# Patient Record
Sex: MALE | Race: WHITE | NOT HISPANIC OR LATINO | Employment: OTHER | URBAN - METROPOLITAN AREA
[De-identification: names, ages, dates, MRNs, and addresses within clinical notes are randomized per-mention and may not be internally consistent; named-entity substitution may affect disease eponyms.]

---

## 2020-10-09 ENCOUNTER — APPOINTMENT (EMERGENCY)
Dept: CT IMAGING | Facility: HOSPITAL | Age: 50
DRG: 853 | End: 2020-10-09
Payer: COMMERCIAL

## 2020-10-09 ENCOUNTER — HOSPITAL ENCOUNTER (INPATIENT)
Facility: HOSPITAL | Age: 50
LOS: 29 days | Discharge: HOME HEALTH | DRG: 853 | End: 2020-11-07
Attending: EMERGENCY MEDICINE | Admitting: SURGERY
Payer: COMMERCIAL

## 2020-10-09 DIAGNOSIS — K57.20 DIVERTICULITIS OF LARGE INTESTINE WITH PERFORATION WITHOUT BLEEDING: ICD-10-CM

## 2020-10-09 DIAGNOSIS — K57.92 DIVERTICULITIS: Primary | ICD-10-CM

## 2020-10-09 DIAGNOSIS — K56.7 ILEUS (HCC): ICD-10-CM

## 2020-10-09 DIAGNOSIS — E03.9 HYPOTHYROIDISM, UNSPECIFIED TYPE: ICD-10-CM

## 2020-10-09 DIAGNOSIS — I10 ESSENTIAL HYPERTENSION: ICD-10-CM

## 2020-10-09 DIAGNOSIS — Z93.3 COLOSTOMY STATUS (HCC): ICD-10-CM

## 2020-10-09 LAB
ALBUMIN SERPL BCP-MCNC: 3.3 G/DL (ref 3.5–5)
ALP SERPL-CCNC: 51 U/L (ref 46–116)
ALT SERPL W P-5'-P-CCNC: 19 U/L (ref 12–78)
ANION GAP SERPL CALCULATED.3IONS-SCNC: 9 MMOL/L (ref 4–13)
APTT PPP: 25 SECONDS (ref 23–37)
AST SERPL W P-5'-P-CCNC: 13 U/L (ref 5–45)
BASOPHILS # BLD MANUAL: 0 THOUSAND/UL (ref 0–0.1)
BASOPHILS NFR MAR MANUAL: 0 % (ref 0–1)
BILIRUB SERPL-MCNC: 0.6 MG/DL (ref 0.2–1)
BILIRUB UR QL STRIP: NEGATIVE
BUN SERPL-MCNC: 14 MG/DL (ref 5–25)
CALCIUM ALBUM COR SERPL-MCNC: 9.6 MG/DL (ref 8.3–10.1)
CALCIUM SERPL-MCNC: 9 MG/DL (ref 8.3–10.1)
CHLORIDE SERPL-SCNC: 97 MMOL/L (ref 100–108)
CLARITY UR: ABNORMAL
CO2 SERPL-SCNC: 28 MMOL/L (ref 21–32)
COLOR UR: YELLOW
CREAT SERPL-MCNC: 0.94 MG/DL (ref 0.6–1.3)
EOSINOPHIL # BLD MANUAL: 0 THOUSAND/UL (ref 0–0.4)
EOSINOPHIL NFR BLD MANUAL: 0 % (ref 0–6)
ERYTHROCYTE [DISTWIDTH] IN BLOOD BY AUTOMATED COUNT: 12 % (ref 11.6–15.1)
GFR SERPL CREATININE-BSD FRML MDRD: 93 ML/MIN/1.73SQ M
GLUCOSE SERPL-MCNC: 149 MG/DL (ref 65–140)
GLUCOSE UR STRIP-MCNC: NEGATIVE MG/DL
HCT VFR BLD AUTO: 41.6 % (ref 36.5–49.3)
HGB BLD-MCNC: 14.3 G/DL (ref 12–17)
HGB UR QL STRIP.AUTO: NEGATIVE
INR PPP: 1.09 (ref 0.84–1.19)
KETONES UR STRIP-MCNC: ABNORMAL MG/DL
LACTATE SERPL-SCNC: 1.9 MMOL/L (ref 0.5–2)
LACTATE SERPL-SCNC: 2.3 MMOL/L (ref 0.5–2)
LEUKOCYTE ESTERASE UR QL STRIP: NEGATIVE
LG PLATELETS BLD QL SMEAR: PRESENT
LIPASE SERPL-CCNC: 73 U/L (ref 73–393)
LYMPHOCYTES # BLD AUTO: 0.15 THOUSAND/UL (ref 0.6–4.47)
LYMPHOCYTES # BLD AUTO: 6 % (ref 14–44)
MCH RBC QN AUTO: 32.4 PG (ref 26.8–34.3)
MCHC RBC AUTO-ENTMCNC: 34.4 G/DL (ref 31.4–37.4)
MCV RBC AUTO: 94 FL (ref 82–98)
MONOCYTES # BLD AUTO: 0.28 THOUSAND/UL (ref 0–1.22)
MONOCYTES NFR BLD: 11 % (ref 4–12)
NEUTROPHILS # BLD MANUAL: 2.06 THOUSAND/UL (ref 1.85–7.62)
NEUTS BAND NFR BLD MANUAL: 15 % (ref 0–8)
NEUTS SEG NFR BLD AUTO: 67 % (ref 43–75)
NITRITE UR QL STRIP: NEGATIVE
NRBC BLD AUTO-RTO: 0 /100 WBCS
PH UR STRIP.AUTO: 7.5 [PH]
PLATELET # BLD AUTO: 375 THOUSANDS/UL (ref 149–390)
PLATELET BLD QL SMEAR: ADEQUATE
PMV BLD AUTO: 8.6 FL (ref 8.9–12.7)
POIKILOCYTOSIS BLD QL SMEAR: PRESENT
POTASSIUM SERPL-SCNC: 4 MMOL/L (ref 3.5–5.3)
PROT SERPL-MCNC: 6.6 G/DL (ref 6.4–8.2)
PROT UR STRIP-MCNC: NEGATIVE MG/DL
PROTHROMBIN TIME: 14.1 SECONDS (ref 11.6–14.5)
RBC # BLD AUTO: 4.42 MILLION/UL (ref 3.88–5.62)
RBC MORPH BLD: PRESENT
SODIUM SERPL-SCNC: 134 MMOL/L (ref 136–145)
SP GR UR STRIP.AUTO: 1.01 (ref 1–1.03)
TOTAL CELLS COUNTED SPEC: 100
TOXIC GRANULES BLD QL SMEAR: PRESENT
UROBILINOGEN UR QL STRIP.AUTO: 1 E.U./DL
VARIANT LYMPHS # BLD AUTO: 1 %
WBC # BLD AUTO: 2.51 THOUSAND/UL (ref 4.31–10.16)

## 2020-10-09 PROCEDURE — 99285 EMERGENCY DEPT VISIT HI MDM: CPT

## 2020-10-09 PROCEDURE — 87040 BLOOD CULTURE FOR BACTERIA: CPT | Performed by: EMERGENCY MEDICINE

## 2020-10-09 PROCEDURE — 80053 COMPREHEN METABOLIC PANEL: CPT | Performed by: EMERGENCY MEDICINE

## 2020-10-09 PROCEDURE — 85730 THROMBOPLASTIN TIME PARTIAL: CPT | Performed by: EMERGENCY MEDICINE

## 2020-10-09 PROCEDURE — G1004 CDSM NDSC: HCPCS

## 2020-10-09 PROCEDURE — 85007 BL SMEAR W/DIFF WBC COUNT: CPT | Performed by: EMERGENCY MEDICINE

## 2020-10-09 PROCEDURE — 83605 ASSAY OF LACTIC ACID: CPT | Performed by: EMERGENCY MEDICINE

## 2020-10-09 PROCEDURE — 81003 URINALYSIS AUTO W/O SCOPE: CPT | Performed by: EMERGENCY MEDICINE

## 2020-10-09 PROCEDURE — 96361 HYDRATE IV INFUSION ADD-ON: CPT

## 2020-10-09 PROCEDURE — 96375 TX/PRO/DX INJ NEW DRUG ADDON: CPT

## 2020-10-09 PROCEDURE — 74177 CT ABD & PELVIS W/CONTRAST: CPT

## 2020-10-09 PROCEDURE — 36415 COLL VENOUS BLD VENIPUNCTURE: CPT | Performed by: EMERGENCY MEDICINE

## 2020-10-09 PROCEDURE — 99285 EMERGENCY DEPT VISIT HI MDM: CPT | Performed by: EMERGENCY MEDICINE

## 2020-10-09 PROCEDURE — 85027 COMPLETE CBC AUTOMATED: CPT | Performed by: EMERGENCY MEDICINE

## 2020-10-09 PROCEDURE — 96374 THER/PROPH/DIAG INJ IV PUSH: CPT

## 2020-10-09 PROCEDURE — 83690 ASSAY OF LIPASE: CPT | Performed by: EMERGENCY MEDICINE

## 2020-10-09 PROCEDURE — 85610 PROTHROMBIN TIME: CPT | Performed by: EMERGENCY MEDICINE

## 2020-10-09 RX ORDER — SODIUM CHLORIDE 9 MG/ML
125 INJECTION, SOLUTION INTRAVENOUS CONTINUOUS
Status: DISCONTINUED | OUTPATIENT
Start: 2020-10-09 | End: 2020-10-14

## 2020-10-09 RX ORDER — HEPARIN SODIUM 5000 [USP'U]/ML
5000 INJECTION, SOLUTION INTRAVENOUS; SUBCUTANEOUS EVERY 8 HOURS SCHEDULED
Status: DISCONTINUED | OUTPATIENT
Start: 2020-10-09 | End: 2020-11-07 | Stop reason: HOSPADM

## 2020-10-09 RX ORDER — HYDROMORPHONE HCL/PF 1 MG/ML
0.5 SYRINGE (ML) INJECTION EVERY 2 HOUR PRN
Status: DISCONTINUED | OUTPATIENT
Start: 2020-10-09 | End: 2020-10-21

## 2020-10-09 RX ORDER — MORPHINE SULFATE 4 MG/ML
4 INJECTION, SOLUTION INTRAMUSCULAR; INTRAVENOUS ONCE
Status: COMPLETED | OUTPATIENT
Start: 2020-10-09 | End: 2020-10-09

## 2020-10-09 RX ORDER — ONDANSETRON 2 MG/ML
4 INJECTION INTRAMUSCULAR; INTRAVENOUS ONCE
Status: COMPLETED | OUTPATIENT
Start: 2020-10-09 | End: 2020-10-09

## 2020-10-09 RX ORDER — KETOROLAC TROMETHAMINE 30 MG/ML
30 INJECTION, SOLUTION INTRAMUSCULAR; INTRAVENOUS ONCE
Status: COMPLETED | OUTPATIENT
Start: 2020-10-09 | End: 2020-10-09

## 2020-10-09 RX ADMIN — PIPERACILLIN AND TAZOBACTAM 3.38 G: 3; .375 INJECTION, POWDER, LYOPHILIZED, FOR SOLUTION INTRAVENOUS at 18:31

## 2020-10-09 RX ADMIN — MORPHINE SULFATE 4 MG: 4 INJECTION INTRAVENOUS at 16:53

## 2020-10-09 RX ADMIN — HEPARIN SODIUM 5000 UNITS: 5000 INJECTION INTRAVENOUS; SUBCUTANEOUS at 22:01

## 2020-10-09 RX ADMIN — IOHEXOL 100 ML: 350 INJECTION, SOLUTION INTRAVENOUS at 16:58

## 2020-10-09 RX ADMIN — ONDANSETRON 4 MG: 2 INJECTION INTRAMUSCULAR; INTRAVENOUS at 15:45

## 2020-10-09 RX ADMIN — HYDROMORPHONE HYDROCHLORIDE 0.5 MG: 1 INJECTION, SOLUTION INTRAMUSCULAR; INTRAVENOUS; SUBCUTANEOUS at 22:00

## 2020-10-09 RX ADMIN — SODIUM CHLORIDE 125 ML/HR: 0.9 INJECTION, SOLUTION INTRAVENOUS at 19:47

## 2020-10-09 RX ADMIN — SODIUM CHLORIDE 1000 ML: 0.9 INJECTION, SOLUTION INTRAVENOUS at 15:45

## 2020-10-09 RX ADMIN — HYDROMORPHONE HYDROCHLORIDE 0.5 MG: 1 INJECTION, SOLUTION INTRAMUSCULAR; INTRAVENOUS; SUBCUTANEOUS at 19:46

## 2020-10-09 RX ADMIN — KETOROLAC TROMETHAMINE 30 MG: 30 INJECTION, SOLUTION INTRAMUSCULAR; INTRAVENOUS at 15:45

## 2020-10-10 PROBLEM — K57.20 DIVERTICULITIS OF LARGE INTESTINE WITH PERFORATION: Status: ACTIVE | Noted: 2020-10-10

## 2020-10-10 LAB
ANION GAP SERPL CALCULATED.3IONS-SCNC: 10 MMOL/L (ref 4–13)
BUN SERPL-MCNC: 11 MG/DL (ref 5–25)
CALCIUM SERPL-MCNC: 7.9 MG/DL (ref 8.3–10.1)
CHLORIDE SERPL-SCNC: 102 MMOL/L (ref 100–108)
CO2 SERPL-SCNC: 23 MMOL/L (ref 21–32)
CREAT SERPL-MCNC: 0.84 MG/DL (ref 0.6–1.3)
ERYTHROCYTE [DISTWIDTH] IN BLOOD BY AUTOMATED COUNT: 12.3 % (ref 11.6–15.1)
GFR SERPL CREATININE-BSD FRML MDRD: 101 ML/MIN/1.73SQ M
GLUCOSE SERPL-MCNC: 110 MG/DL (ref 65–140)
HCT VFR BLD AUTO: 38.4 % (ref 36.5–49.3)
HGB BLD-MCNC: 13.1 G/DL (ref 12–17)
MCH RBC QN AUTO: 32.1 PG (ref 26.8–34.3)
MCHC RBC AUTO-ENTMCNC: 34.1 G/DL (ref 31.4–37.4)
MCV RBC AUTO: 94 FL (ref 82–98)
NRBC BLD AUTO-RTO: 0 /100 WBCS
PLATELET # BLD AUTO: 327 THOUSANDS/UL (ref 149–390)
PMV BLD AUTO: 8.4 FL (ref 8.9–12.7)
POTASSIUM SERPL-SCNC: 3.7 MMOL/L (ref 3.5–5.3)
RBC # BLD AUTO: 4.08 MILLION/UL (ref 3.88–5.62)
SODIUM SERPL-SCNC: 135 MMOL/L (ref 136–145)
WBC # BLD AUTO: 11.29 THOUSAND/UL (ref 4.31–10.16)

## 2020-10-10 PROCEDURE — 99222 1ST HOSP IP/OBS MODERATE 55: CPT | Performed by: SURGERY

## 2020-10-10 PROCEDURE — 80048 BASIC METABOLIC PNL TOTAL CA: CPT | Performed by: SURGERY

## 2020-10-10 PROCEDURE — 85027 COMPLETE CBC AUTOMATED: CPT | Performed by: SURGERY

## 2020-10-10 RX ORDER — MAGNESIUM HYDROXIDE/ALUMINUM HYDROXICE/SIMETHICONE 120; 1200; 1200 MG/30ML; MG/30ML; MG/30ML
30 SUSPENSION ORAL EVERY 4 HOURS PRN
Status: DISCONTINUED | OUTPATIENT
Start: 2020-10-10 | End: 2020-10-12

## 2020-10-10 RX ORDER — HYDROMORPHONE HCL/PF 1 MG/ML
0.5 SYRINGE (ML) INJECTION
Status: DISCONTINUED | OUTPATIENT
Start: 2020-10-10 | End: 2020-10-21

## 2020-10-10 RX ORDER — FAMOTIDINE 20 MG/1
20 TABLET, FILM COATED ORAL 2 TIMES DAILY PRN
Status: DISCONTINUED | OUTPATIENT
Start: 2020-10-10 | End: 2020-10-12

## 2020-10-10 RX ADMIN — PIPERACILLIN AND TAZOBACTAM 3.38 G: 3; .375 INJECTION, POWDER, LYOPHILIZED, FOR SOLUTION INTRAVENOUS at 00:05

## 2020-10-10 RX ADMIN — FAMOTIDINE 20 MG: 20 TABLET, FILM COATED ORAL at 21:24

## 2020-10-10 RX ADMIN — HEPARIN SODIUM 5000 UNITS: 5000 INJECTION INTRAVENOUS; SUBCUTANEOUS at 13:43

## 2020-10-10 RX ADMIN — HEPARIN SODIUM 5000 UNITS: 5000 INJECTION INTRAVENOUS; SUBCUTANEOUS at 06:23

## 2020-10-10 RX ADMIN — MORPHINE SULFATE 2 MG: 2 INJECTION, SOLUTION INTRAMUSCULAR; INTRAVENOUS at 10:34

## 2020-10-10 RX ADMIN — PIPERACILLIN AND TAZOBACTAM 3.38 G: 3; .375 INJECTION, POWDER, LYOPHILIZED, FOR SOLUTION INTRAVENOUS at 06:23

## 2020-10-10 RX ADMIN — HEPARIN SODIUM 5000 UNITS: 5000 INJECTION INTRAVENOUS; SUBCUTANEOUS at 21:24

## 2020-10-10 RX ADMIN — HYDROMORPHONE HYDROCHLORIDE 0.5 MG: 1 INJECTION, SOLUTION INTRAMUSCULAR; INTRAVENOUS; SUBCUTANEOUS at 02:18

## 2020-10-10 RX ADMIN — SODIUM CHLORIDE 125 ML/HR: 0.9 INJECTION, SOLUTION INTRAVENOUS at 12:18

## 2020-10-10 RX ADMIN — PIPERACILLIN AND TAZOBACTAM 3.38 G: 3; .375 INJECTION, POWDER, LYOPHILIZED, FOR SOLUTION INTRAVENOUS at 13:42

## 2020-10-10 RX ADMIN — HYDROMORPHONE HYDROCHLORIDE 0.5 MG: 1 INJECTION, SOLUTION INTRAMUSCULAR; INTRAVENOUS; SUBCUTANEOUS at 08:41

## 2020-10-10 RX ADMIN — SODIUM CHLORIDE 125 ML/HR: 0.9 INJECTION, SOLUTION INTRAVENOUS at 21:24

## 2020-10-10 RX ADMIN — HYDROMORPHONE HYDROCHLORIDE 0.5 MG: 1 INJECTION, SOLUTION INTRAMUSCULAR; INTRAVENOUS; SUBCUTANEOUS at 04:13

## 2020-10-10 RX ADMIN — MORPHINE SULFATE 2 MG: 2 INJECTION, SOLUTION INTRAMUSCULAR; INTRAVENOUS at 16:09

## 2020-10-10 RX ADMIN — ALUMINUM HYDROXIDE, MAGNESIUM HYDROXIDE, AND SIMETHICONE 30 ML: 200; 200; 20 SUSPENSION ORAL at 13:42

## 2020-10-10 RX ADMIN — HYDROMORPHONE HYDROCHLORIDE 0.5 MG: 1 INJECTION, SOLUTION INTRAMUSCULAR; INTRAVENOUS; SUBCUTANEOUS at 00:05

## 2020-10-10 RX ADMIN — FAMOTIDINE 20 MG: 20 TABLET, FILM COATED ORAL at 16:50

## 2020-10-10 RX ADMIN — SODIUM CHLORIDE 125 ML/HR: 0.9 INJECTION, SOLUTION INTRAVENOUS at 03:55

## 2020-10-10 RX ADMIN — PIPERACILLIN AND TAZOBACTAM 3.38 G: 3; .375 INJECTION, POWDER, LYOPHILIZED, FOR SOLUTION INTRAVENOUS at 18:15

## 2020-10-10 RX ADMIN — HYDROMORPHONE HYDROCHLORIDE 0.5 MG: 1 INJECTION, SOLUTION INTRAMUSCULAR; INTRAVENOUS; SUBCUTANEOUS at 06:23

## 2020-10-11 LAB
ANION GAP SERPL CALCULATED.3IONS-SCNC: 9 MMOL/L (ref 4–13)
BASOPHILS # BLD MANUAL: 0 THOUSAND/UL (ref 0–0.1)
BASOPHILS NFR MAR MANUAL: 0 % (ref 0–1)
BUN SERPL-MCNC: 13 MG/DL (ref 5–25)
CALCIUM SERPL-MCNC: 8.2 MG/DL (ref 8.3–10.1)
CHLORIDE SERPL-SCNC: 102 MMOL/L (ref 100–108)
CO2 SERPL-SCNC: 23 MMOL/L (ref 21–32)
CREAT SERPL-MCNC: 0.7 MG/DL (ref 0.6–1.3)
EOSINOPHIL # BLD MANUAL: 0 THOUSAND/UL (ref 0–0.4)
EOSINOPHIL NFR BLD MANUAL: 0 % (ref 0–6)
ERYTHROCYTE [DISTWIDTH] IN BLOOD BY AUTOMATED COUNT: 12.7 % (ref 11.6–15.1)
GFR SERPL CREATININE-BSD FRML MDRD: 109 ML/MIN/1.73SQ M
GLUCOSE SERPL-MCNC: 122 MG/DL (ref 65–140)
HCT VFR BLD AUTO: 37 % (ref 36.5–49.3)
HGB BLD-MCNC: 12.5 G/DL (ref 12–17)
LYMPHOCYTES # BLD AUTO: 0.61 THOUSAND/UL (ref 0.6–4.47)
LYMPHOCYTES # BLD AUTO: 3 % (ref 14–44)
MAGNESIUM SERPL-MCNC: 2.1 MG/DL (ref 1.6–2.6)
MCH RBC QN AUTO: 32.6 PG (ref 26.8–34.3)
MCHC RBC AUTO-ENTMCNC: 33.8 G/DL (ref 31.4–37.4)
MCV RBC AUTO: 96 FL (ref 82–98)
MONOCYTES # BLD AUTO: 0.41 THOUSAND/UL (ref 0–1.22)
MONOCYTES NFR BLD: 2 % (ref 4–12)
NEUTROPHILS # BLD MANUAL: 19.37 THOUSAND/UL (ref 1.85–7.62)
NEUTS BAND NFR BLD MANUAL: 20 % (ref 0–8)
NEUTS SEG NFR BLD AUTO: 75 % (ref 43–75)
NRBC BLD AUTO-RTO: 0 /100 WBCS
PHOSPHATE SERPL-MCNC: 1.6 MG/DL (ref 2.7–4.5)
PLATELET # BLD AUTO: 315 THOUSANDS/UL (ref 149–390)
PLATELET BLD QL SMEAR: ADEQUATE
PMV BLD AUTO: 9.1 FL (ref 8.9–12.7)
POTASSIUM SERPL-SCNC: 3.7 MMOL/L (ref 3.5–5.3)
RBC # BLD AUTO: 3.84 MILLION/UL (ref 3.88–5.62)
SODIUM SERPL-SCNC: 134 MMOL/L (ref 136–145)
TOTAL CELLS COUNTED SPEC: 100
TOXIC GRANULES BLD QL SMEAR: PRESENT
WBC # BLD AUTO: 20.39 THOUSAND/UL (ref 4.31–10.16)

## 2020-10-11 PROCEDURE — 80048 BASIC METABOLIC PNL TOTAL CA: CPT | Performed by: PHYSICIAN ASSISTANT

## 2020-10-11 PROCEDURE — 99232 SBSQ HOSP IP/OBS MODERATE 35: CPT | Performed by: SURGERY

## 2020-10-11 PROCEDURE — 85007 BL SMEAR W/DIFF WBC COUNT: CPT | Performed by: PHYSICIAN ASSISTANT

## 2020-10-11 PROCEDURE — 85027 COMPLETE CBC AUTOMATED: CPT | Performed by: PHYSICIAN ASSISTANT

## 2020-10-11 PROCEDURE — 84100 ASSAY OF PHOSPHORUS: CPT | Performed by: PHYSICIAN ASSISTANT

## 2020-10-11 PROCEDURE — 83735 ASSAY OF MAGNESIUM: CPT | Performed by: PHYSICIAN ASSISTANT

## 2020-10-11 RX ORDER — ZOLPIDEM TARTRATE 5 MG/1
5 TABLET ORAL
Status: DISCONTINUED | OUTPATIENT
Start: 2020-10-11 | End: 2020-10-12

## 2020-10-11 RX ORDER — AMLODIPINE BESYLATE 5 MG/1
10 TABLET ORAL
Status: DISCONTINUED | OUTPATIENT
Start: 2020-10-11 | End: 2020-10-12

## 2020-10-11 RX ORDER — LOSARTAN POTASSIUM 50 MG/1
100 TABLET ORAL
Status: DISCONTINUED | OUTPATIENT
Start: 2020-10-11 | End: 2020-10-12

## 2020-10-11 RX ORDER — HYDROCHLOROTHIAZIDE 25 MG/1
25 TABLET ORAL
Status: DISCONTINUED | OUTPATIENT
Start: 2020-10-11 | End: 2020-10-12

## 2020-10-11 RX ORDER — PRAVASTATIN SODIUM 40 MG
40 TABLET ORAL
Status: DISCONTINUED | OUTPATIENT
Start: 2020-10-11 | End: 2020-10-12

## 2020-10-11 RX ORDER — LEVOTHYROXINE SODIUM 0.07 MG/1
75 TABLET ORAL
Status: DISCONTINUED | OUTPATIENT
Start: 2020-10-11 | End: 2020-10-12

## 2020-10-11 RX ADMIN — MORPHINE SULFATE 2 MG: 2 INJECTION, SOLUTION INTRAMUSCULAR; INTRAVENOUS at 03:15

## 2020-10-11 RX ADMIN — PIPERACILLIN AND TAZOBACTAM 3.38 G: 3; .375 INJECTION, POWDER, LYOPHILIZED, FOR SOLUTION INTRAVENOUS at 12:24

## 2020-10-11 RX ADMIN — PIPERACILLIN AND TAZOBACTAM 3.38 G: 3; .375 INJECTION, POWDER, LYOPHILIZED, FOR SOLUTION INTRAVENOUS at 19:20

## 2020-10-11 RX ADMIN — MORPHINE SULFATE 2 MG: 2 INJECTION, SOLUTION INTRAMUSCULAR; INTRAVENOUS at 19:30

## 2020-10-11 RX ADMIN — PRAVASTATIN SODIUM 40 MG: 40 TABLET ORAL at 15:36

## 2020-10-11 RX ADMIN — HEPARIN SODIUM 5000 UNITS: 5000 INJECTION INTRAVENOUS; SUBCUTANEOUS at 15:36

## 2020-10-11 RX ADMIN — ZOLPIDEM TARTRATE 5 MG: 5 TABLET, COATED ORAL at 22:03

## 2020-10-11 RX ADMIN — HEPARIN SODIUM 5000 UNITS: 5000 INJECTION INTRAVENOUS; SUBCUTANEOUS at 06:19

## 2020-10-11 RX ADMIN — HYDROMORPHONE HYDROCHLORIDE 0.5 MG: 1 INJECTION, SOLUTION INTRAMUSCULAR; INTRAVENOUS; SUBCUTANEOUS at 11:25

## 2020-10-11 RX ADMIN — LOSARTAN POTASSIUM 100 MG: 50 TABLET, FILM COATED ORAL at 22:03

## 2020-10-11 RX ADMIN — AMLODIPINE BESYLATE 10 MG: 5 TABLET ORAL at 22:03

## 2020-10-11 RX ADMIN — LEVOTHYROXINE SODIUM 75 MCG: 75 TABLET ORAL at 09:09

## 2020-10-11 RX ADMIN — SODIUM CHLORIDE 125 ML/HR: 0.9 INJECTION, SOLUTION INTRAVENOUS at 15:44

## 2020-10-11 RX ADMIN — HYDROCHLOROTHIAZIDE 25 MG: 25 TABLET ORAL at 22:03

## 2020-10-11 RX ADMIN — PIPERACILLIN AND TAZOBACTAM 3.38 G: 3; .375 INJECTION, POWDER, LYOPHILIZED, FOR SOLUTION INTRAVENOUS at 00:25

## 2020-10-11 RX ADMIN — HEPARIN SODIUM 5000 UNITS: 5000 INJECTION INTRAVENOUS; SUBCUTANEOUS at 22:02

## 2020-10-11 RX ADMIN — PIPERACILLIN AND TAZOBACTAM 3.38 G: 3; .375 INJECTION, POWDER, LYOPHILIZED, FOR SOLUTION INTRAVENOUS at 06:19

## 2020-10-12 ENCOUNTER — ANESTHESIA (INPATIENT)
Dept: PERIOP | Facility: HOSPITAL | Age: 50
DRG: 853 | End: 2020-10-12
Payer: COMMERCIAL

## 2020-10-12 ENCOUNTER — APPOINTMENT (INPATIENT)
Dept: CT IMAGING | Facility: HOSPITAL | Age: 50
DRG: 853 | End: 2020-10-12
Payer: COMMERCIAL

## 2020-10-12 ENCOUNTER — ANESTHESIA EVENT (INPATIENT)
Dept: PERIOP | Facility: HOSPITAL | Age: 50
DRG: 853 | End: 2020-10-12
Payer: COMMERCIAL

## 2020-10-12 VITALS — HEART RATE: 104 BPM

## 2020-10-12 PROBLEM — A41.9 SEPSIS (HCC): Status: ACTIVE | Noted: 2020-10-12

## 2020-10-12 PROBLEM — I10 HTN (HYPERTENSION): Status: ACTIVE | Noted: 2020-10-12

## 2020-10-12 PROBLEM — E03.9 HYPOTHYROIDISM: Status: ACTIVE | Noted: 2020-10-12

## 2020-10-12 PROBLEM — E78.5 HYPERLIPIDEMIA: Status: ACTIVE | Noted: 2020-10-12

## 2020-10-12 LAB
ANION GAP SERPL CALCULATED.3IONS-SCNC: 6 MMOL/L (ref 4–13)
BUN SERPL-MCNC: 17 MG/DL (ref 5–25)
CALCIUM SERPL-MCNC: 7.9 MG/DL (ref 8.3–10.1)
CHLORIDE SERPL-SCNC: 105 MMOL/L (ref 100–108)
CO2 SERPL-SCNC: 24 MMOL/L (ref 21–32)
CREAT SERPL-MCNC: 0.75 MG/DL (ref 0.6–1.3)
ERYTHROCYTE [DISTWIDTH] IN BLOOD BY AUTOMATED COUNT: 12.8 % (ref 11.6–15.1)
GFR SERPL CREATININE-BSD FRML MDRD: 106 ML/MIN/1.73SQ M
GLUCOSE SERPL-MCNC: 116 MG/DL (ref 65–140)
HCT VFR BLD AUTO: 31.5 % (ref 36.5–49.3)
HGB BLD-MCNC: 10.7 G/DL (ref 12–17)
MCH RBC QN AUTO: 32.3 PG (ref 26.8–34.3)
MCHC RBC AUTO-ENTMCNC: 34 G/DL (ref 31.4–37.4)
MCV RBC AUTO: 95 FL (ref 82–98)
PLATELET # BLD AUTO: 293 THOUSANDS/UL (ref 149–390)
PMV BLD AUTO: 8.9 FL (ref 8.9–12.7)
POTASSIUM SERPL-SCNC: 3.5 MMOL/L (ref 3.5–5.3)
RBC # BLD AUTO: 3.31 MILLION/UL (ref 3.88–5.62)
SODIUM SERPL-SCNC: 135 MMOL/L (ref 136–145)
WBC # BLD AUTO: 24.07 THOUSAND/UL (ref 4.31–10.16)

## 2020-10-12 PROCEDURE — 85027 COMPLETE CBC AUTOMATED: CPT | Performed by: PHYSICIAN ASSISTANT

## 2020-10-12 PROCEDURE — 87040 BLOOD CULTURE FOR BACTERIA: CPT | Performed by: INTERNAL MEDICINE

## 2020-10-12 PROCEDURE — 49322 LAPAROSCOPY ASPIRATION: CPT | Performed by: PHYSICIAN ASSISTANT

## 2020-10-12 PROCEDURE — 0DN84ZZ RELEASE SMALL INTESTINE, PERCUTANEOUS ENDOSCOPIC APPROACH: ICD-10-PCS | Performed by: SURGERY

## 2020-10-12 PROCEDURE — 87075 CULTR BACTERIA EXCEPT BLOOD: CPT | Performed by: SURGERY

## 2020-10-12 PROCEDURE — 87186 SC STD MICRODIL/AGAR DIL: CPT | Performed by: SURGERY

## 2020-10-12 PROCEDURE — 87076 CULTURE ANAEROBE IDENT EACH: CPT | Performed by: SURGERY

## 2020-10-12 PROCEDURE — 87185 SC STD ENZYME DETCJ PER NZM: CPT | Performed by: SURGERY

## 2020-10-12 PROCEDURE — 0W9J40Z DRAINAGE OF PELVIC CAVITY WITH DRAINAGE DEVICE, PERCUTANEOUS ENDOSCOPIC APPROACH: ICD-10-PCS | Performed by: SURGERY

## 2020-10-12 PROCEDURE — 87070 CULTURE OTHR SPECIMN AEROBIC: CPT | Performed by: SURGERY

## 2020-10-12 PROCEDURE — G1004 CDSM NDSC: HCPCS

## 2020-10-12 PROCEDURE — 74177 CT ABD & PELVIS W/CONTRAST: CPT

## 2020-10-12 PROCEDURE — C9113 INJ PANTOPRAZOLE SODIUM, VIA: HCPCS | Performed by: PHYSICIAN ASSISTANT

## 2020-10-12 PROCEDURE — 99223 1ST HOSP IP/OBS HIGH 75: CPT | Performed by: INTERNAL MEDICINE

## 2020-10-12 PROCEDURE — 80048 BASIC METABOLIC PNL TOTAL CA: CPT | Performed by: PHYSICIAN ASSISTANT

## 2020-10-12 PROCEDURE — 99024 POSTOP FOLLOW-UP VISIT: CPT | Performed by: SURGERY

## 2020-10-12 PROCEDURE — 87077 CULTURE AEROBIC IDENTIFY: CPT | Performed by: SURGERY

## 2020-10-12 PROCEDURE — 87205 SMEAR GRAM STAIN: CPT | Performed by: SURGERY

## 2020-10-12 PROCEDURE — 49322 LAPAROSCOPY ASPIRATION: CPT | Performed by: SURGERY

## 2020-10-12 RX ORDER — ONDANSETRON 2 MG/ML
4 INJECTION INTRAMUSCULAR; INTRAVENOUS EVERY 4 HOURS PRN
Status: DISCONTINUED | OUTPATIENT
Start: 2020-10-12 | End: 2020-11-04

## 2020-10-12 RX ORDER — MIDAZOLAM HYDROCHLORIDE 2 MG/2ML
INJECTION, SOLUTION INTRAMUSCULAR; INTRAVENOUS AS NEEDED
Status: DISCONTINUED | OUTPATIENT
Start: 2020-10-12 | End: 2020-10-12

## 2020-10-12 RX ORDER — GLYCOPYRROLATE 0.2 MG/ML
INJECTION INTRAMUSCULAR; INTRAVENOUS AS NEEDED
Status: DISCONTINUED | OUTPATIENT
Start: 2020-10-12 | End: 2020-10-12

## 2020-10-12 RX ORDER — PROPOFOL 10 MG/ML
INJECTION, EMULSION INTRAVENOUS AS NEEDED
Status: DISCONTINUED | OUTPATIENT
Start: 2020-10-12 | End: 2020-10-12

## 2020-10-12 RX ORDER — LORAZEPAM 2 MG/ML
0.5 INJECTION INTRAMUSCULAR ONCE
Status: COMPLETED | OUTPATIENT
Start: 2020-10-12 | End: 2020-10-12

## 2020-10-12 RX ORDER — FENTANYL CITRATE 50 UG/ML
INJECTION, SOLUTION INTRAMUSCULAR; INTRAVENOUS AS NEEDED
Status: DISCONTINUED | OUTPATIENT
Start: 2020-10-12 | End: 2020-10-12

## 2020-10-12 RX ORDER — FENTANYL CITRATE/PF 50 MCG/ML
25 SYRINGE (ML) INJECTION
Status: DISCONTINUED | OUTPATIENT
Start: 2020-10-12 | End: 2020-10-12

## 2020-10-12 RX ORDER — ONDANSETRON 2 MG/ML
INJECTION INTRAMUSCULAR; INTRAVENOUS AS NEEDED
Status: DISCONTINUED | OUTPATIENT
Start: 2020-10-12 | End: 2020-10-12

## 2020-10-12 RX ORDER — PANTOPRAZOLE SODIUM 40 MG/1
40 INJECTION, POWDER, FOR SOLUTION INTRAVENOUS DAILY
Status: DISCONTINUED | OUTPATIENT
Start: 2020-10-12 | End: 2020-10-27

## 2020-10-12 RX ORDER — SODIUM CHLORIDE, SODIUM LACTATE, POTASSIUM CHLORIDE, CALCIUM CHLORIDE 600; 310; 30; 20 MG/100ML; MG/100ML; MG/100ML; MG/100ML
INJECTION, SOLUTION INTRAVENOUS CONTINUOUS PRN
Status: DISCONTINUED | OUTPATIENT
Start: 2020-10-12 | End: 2020-10-12

## 2020-10-12 RX ORDER — DEXAMETHASONE SODIUM PHOSPHATE 4 MG/ML
4 INJECTION, SOLUTION INTRA-ARTICULAR; INTRALESIONAL; INTRAMUSCULAR; INTRAVENOUS; SOFT TISSUE ONCE AS NEEDED
Status: DISCONTINUED | OUTPATIENT
Start: 2020-10-12 | End: 2020-10-12

## 2020-10-12 RX ORDER — HYDRALAZINE HYDROCHLORIDE 20 MG/ML
5 INJECTION INTRAMUSCULAR; INTRAVENOUS EVERY 6 HOURS PRN
Status: DISCONTINUED | OUTPATIENT
Start: 2020-10-12 | End: 2020-10-12

## 2020-10-12 RX ORDER — ONDANSETRON 2 MG/ML
4 INJECTION INTRAMUSCULAR; INTRAVENOUS ONCE
Status: DISCONTINUED | OUTPATIENT
Start: 2020-10-12 | End: 2020-10-12

## 2020-10-12 RX ORDER — HYDROMORPHONE HCL/PF 1 MG/ML
0.5 SYRINGE (ML) INJECTION
Status: DISCONTINUED | OUTPATIENT
Start: 2020-10-12 | End: 2020-10-12

## 2020-10-12 RX ORDER — HYDRALAZINE HYDROCHLORIDE 20 MG/ML
10 INJECTION INTRAMUSCULAR; INTRAVENOUS EVERY 6 HOURS PRN
Status: DISCONTINUED | OUTPATIENT
Start: 2020-10-12 | End: 2020-11-07 | Stop reason: HOSPADM

## 2020-10-12 RX ORDER — ROCURONIUM BROMIDE 10 MG/ML
INJECTION, SOLUTION INTRAVENOUS AS NEEDED
Status: DISCONTINUED | OUTPATIENT
Start: 2020-10-12 | End: 2020-10-12

## 2020-10-12 RX ORDER — NEOSTIGMINE METHYLSULFATE 1 MG/ML
INJECTION INTRAVENOUS AS NEEDED
Status: DISCONTINUED | OUTPATIENT
Start: 2020-10-12 | End: 2020-10-12

## 2020-10-12 RX ADMIN — SODIUM CHLORIDE 125 ML/HR: 0.9 INJECTION, SOLUTION INTRAVENOUS at 09:23

## 2020-10-12 RX ADMIN — IOHEXOL 100 ML: 350 INJECTION, SOLUTION INTRAVENOUS at 09:25

## 2020-10-12 RX ADMIN — ONDANSETRON 4 MG: 2 INJECTION INTRAMUSCULAR; INTRAVENOUS at 10:28

## 2020-10-12 RX ADMIN — LEVOTHYROXINE SODIUM 75 MCG: 75 TABLET ORAL at 06:24

## 2020-10-12 RX ADMIN — ROCURONIUM BROMIDE 10 MG: 10 INJECTION, SOLUTION INTRAVENOUS at 16:11

## 2020-10-12 RX ADMIN — PIPERACILLIN AND TAZOBACTAM 3.38 G: 3; .375 INJECTION, POWDER, LYOPHILIZED, FOR SOLUTION INTRAVENOUS at 12:30

## 2020-10-12 RX ADMIN — ONDANSETRON 4 MG: 2 INJECTION INTRAMUSCULAR; INTRAVENOUS at 16:48

## 2020-10-12 RX ADMIN — ROCURONIUM BROMIDE 50 MG: 10 INJECTION, SOLUTION INTRAVENOUS at 15:37

## 2020-10-12 RX ADMIN — MORPHINE SULFATE 2 MG: 2 INJECTION, SOLUTION INTRAMUSCULAR; INTRAVENOUS at 09:55

## 2020-10-12 RX ADMIN — SODIUM CHLORIDE 125 ML/HR: 0.9 INJECTION, SOLUTION INTRAVENOUS at 00:37

## 2020-10-12 RX ADMIN — MIDAZOLAM 2 MG: 1 INJECTION INTRAMUSCULAR; INTRAVENOUS at 15:34

## 2020-10-12 RX ADMIN — NEOSTIGMINE METHYLSULFATE 4 MG: 1 INJECTION, SOLUTION INTRAVENOUS at 16:48

## 2020-10-12 RX ADMIN — FENTANYL CITRATE 50 MCG: 50 INJECTION, SOLUTION INTRAMUSCULAR; INTRAVENOUS at 16:02

## 2020-10-12 RX ADMIN — HEPARIN SODIUM 5000 UNITS: 5000 INJECTION INTRAVENOUS; SUBCUTANEOUS at 06:23

## 2020-10-12 RX ADMIN — SODIUM CHLORIDE, SODIUM LACTATE, POTASSIUM CHLORIDE, AND CALCIUM CHLORIDE: .6; .31; .03; .02 INJECTION, SOLUTION INTRAVENOUS at 15:35

## 2020-10-12 RX ADMIN — PROPOFOL 200 MG: 10 INJECTION, EMULSION INTRAVENOUS at 15:37

## 2020-10-12 RX ADMIN — HYDROMORPHONE HYDROCHLORIDE 0.5 MG: 1 INJECTION, SOLUTION INTRAMUSCULAR; INTRAVENOUS; SUBCUTANEOUS at 22:43

## 2020-10-12 RX ADMIN — LORAZEPAM 0.5 MG: 2 INJECTION INTRAMUSCULAR; INTRAVENOUS at 13:17

## 2020-10-12 RX ADMIN — FENTANYL CITRATE 25 MCG: 50 INJECTION, SOLUTION INTRAMUSCULAR; INTRAVENOUS at 16:49

## 2020-10-12 RX ADMIN — PIPERACILLIN AND TAZOBACTAM 3.38 G: 3; .375 INJECTION, POWDER, LYOPHILIZED, FOR SOLUTION INTRAVENOUS at 18:45

## 2020-10-12 RX ADMIN — MORPHINE SULFATE 2 MG: 2 INJECTION, SOLUTION INTRAMUSCULAR; INTRAVENOUS at 00:36

## 2020-10-12 RX ADMIN — PIPERACILLIN AND TAZOBACTAM 3.38 G: 3; .375 INJECTION, POWDER, LYOPHILIZED, FOR SOLUTION INTRAVENOUS at 00:35

## 2020-10-12 RX ADMIN — PIPERACILLIN AND TAZOBACTAM 3.38 G: 3; .375 INJECTION, POWDER, LYOPHILIZED, FOR SOLUTION INTRAVENOUS at 06:23

## 2020-10-12 RX ADMIN — PANTOPRAZOLE SODIUM 40 MG: 40 INJECTION, POWDER, FOR SOLUTION INTRAVENOUS at 09:23

## 2020-10-12 RX ADMIN — PIPERACILLIN AND TAZOBACTAM 3.38 G: 3; .375 INJECTION, POWDER, LYOPHILIZED, FOR SOLUTION INTRAVENOUS at 23:34

## 2020-10-12 RX ADMIN — GLYCOPYRROLATE 0.6 MG: 0.2 INJECTION, SOLUTION INTRAMUSCULAR; INTRAVENOUS at 16:48

## 2020-10-12 RX ADMIN — FENTANYL CITRATE 100 MCG: 50 INJECTION, SOLUTION INTRAMUSCULAR; INTRAVENOUS at 15:37

## 2020-10-12 RX ADMIN — FENTANYL CITRATE 50 MCG: 50 INJECTION, SOLUTION INTRAMUSCULAR; INTRAVENOUS at 16:06

## 2020-10-13 LAB
ALBUMIN SERPL BCP-MCNC: 1.7 G/DL (ref 3.5–5)
ALP SERPL-CCNC: 57 U/L (ref 46–116)
ALT SERPL W P-5'-P-CCNC: 14 U/L (ref 12–78)
ANION GAP SERPL CALCULATED.3IONS-SCNC: 11 MMOL/L (ref 4–13)
AST SERPL W P-5'-P-CCNC: 17 U/L (ref 5–45)
BASOPHILS # BLD AUTO: 0.06 THOUSANDS/ΜL (ref 0–0.1)
BASOPHILS NFR BLD AUTO: 0 % (ref 0–1)
BILIRUB DIRECT SERPL-MCNC: 0.15 MG/DL (ref 0–0.2)
BILIRUB SERPL-MCNC: 0.3 MG/DL (ref 0.2–1)
BUN SERPL-MCNC: 14 MG/DL (ref 5–25)
CALCIUM SERPL-MCNC: 7.3 MG/DL (ref 8.3–10.1)
CHLORIDE SERPL-SCNC: 106 MMOL/L (ref 100–108)
CO2 SERPL-SCNC: 22 MMOL/L (ref 21–32)
CREAT SERPL-MCNC: 0.73 MG/DL (ref 0.6–1.3)
EOSINOPHIL # BLD AUTO: 0 THOUSAND/ΜL (ref 0–0.61)
EOSINOPHIL NFR BLD AUTO: 0 % (ref 0–6)
ERYTHROCYTE [DISTWIDTH] IN BLOOD BY AUTOMATED COUNT: 13.2 % (ref 11.6–15.1)
GFR SERPL CREATININE-BSD FRML MDRD: 107 ML/MIN/1.73SQ M
GLUCOSE SERPL-MCNC: 103 MG/DL (ref 65–140)
HCT VFR BLD AUTO: 35.8 % (ref 36.5–49.3)
HGB BLD-MCNC: 12.1 G/DL (ref 12–17)
IMM GRANULOCYTES # BLD AUTO: 0.2 THOUSAND/UL (ref 0–0.2)
IMM GRANULOCYTES NFR BLD AUTO: 1 % (ref 0–2)
LYMPHOCYTES # BLD AUTO: 0.94 THOUSANDS/ΜL (ref 0.6–4.47)
LYMPHOCYTES NFR BLD AUTO: 5 % (ref 14–44)
MAGNESIUM SERPL-MCNC: 2 MG/DL (ref 1.6–2.6)
MCH RBC QN AUTO: 32 PG (ref 26.8–34.3)
MCHC RBC AUTO-ENTMCNC: 33.8 G/DL (ref 31.4–37.4)
MCV RBC AUTO: 95 FL (ref 82–98)
MONOCYTES # BLD AUTO: 0.95 THOUSAND/ΜL (ref 0.17–1.22)
MONOCYTES NFR BLD AUTO: 5 % (ref 4–12)
NEUTROPHILS # BLD AUTO: 15.65 THOUSANDS/ΜL (ref 1.85–7.62)
NEUTS SEG NFR BLD AUTO: 89 % (ref 43–75)
NRBC BLD AUTO-RTO: 0 /100 WBCS
PHOSPHATE SERPL-MCNC: 2.7 MG/DL (ref 2.7–4.5)
PLATELET # BLD AUTO: 326 THOUSANDS/UL (ref 149–390)
PMV BLD AUTO: 8.8 FL (ref 8.9–12.7)
POTASSIUM SERPL-SCNC: 3.2 MMOL/L (ref 3.5–5.3)
PROT SERPL-MCNC: 5.5 G/DL (ref 6.4–8.2)
RBC # BLD AUTO: 3.78 MILLION/UL (ref 3.88–5.62)
SODIUM SERPL-SCNC: 139 MMOL/L (ref 136–145)
WBC # BLD AUTO: 17.8 THOUSAND/UL (ref 4.31–10.16)

## 2020-10-13 PROCEDURE — 80076 HEPATIC FUNCTION PANEL: CPT | Performed by: INTERNAL MEDICINE

## 2020-10-13 PROCEDURE — 83735 ASSAY OF MAGNESIUM: CPT | Performed by: PHYSICIAN ASSISTANT

## 2020-10-13 PROCEDURE — 99232 SBSQ HOSP IP/OBS MODERATE 35: CPT | Performed by: INTERNAL MEDICINE

## 2020-10-13 PROCEDURE — 80048 BASIC METABOLIC PNL TOTAL CA: CPT | Performed by: PHYSICIAN ASSISTANT

## 2020-10-13 PROCEDURE — C9113 INJ PANTOPRAZOLE SODIUM, VIA: HCPCS | Performed by: PHYSICIAN ASSISTANT

## 2020-10-13 PROCEDURE — 85025 COMPLETE CBC W/AUTO DIFF WBC: CPT | Performed by: PHYSICIAN ASSISTANT

## 2020-10-13 PROCEDURE — 99024 POSTOP FOLLOW-UP VISIT: CPT | Performed by: PHYSICIAN ASSISTANT

## 2020-10-13 PROCEDURE — 84100 ASSAY OF PHOSPHORUS: CPT | Performed by: PHYSICIAN ASSISTANT

## 2020-10-13 RX ORDER — POTASSIUM CHLORIDE 14.9 MG/ML
20 INJECTION INTRAVENOUS
Status: COMPLETED | OUTPATIENT
Start: 2020-10-13 | End: 2020-10-13

## 2020-10-13 RX ADMIN — MORPHINE SULFATE 2 MG: 2 INJECTION, SOLUTION INTRAMUSCULAR; INTRAVENOUS at 16:45

## 2020-10-13 RX ADMIN — HYDROMORPHONE HYDROCHLORIDE 0.5 MG: 1 INJECTION, SOLUTION INTRAMUSCULAR; INTRAVENOUS; SUBCUTANEOUS at 04:26

## 2020-10-13 RX ADMIN — SODIUM CHLORIDE 125 ML/HR: 0.9 INJECTION, SOLUTION INTRAVENOUS at 14:11

## 2020-10-13 RX ADMIN — HEPARIN SODIUM 5000 UNITS: 5000 INJECTION INTRAVENOUS; SUBCUTANEOUS at 14:07

## 2020-10-13 RX ADMIN — HEPARIN SODIUM 5000 UNITS: 5000 INJECTION INTRAVENOUS; SUBCUTANEOUS at 21:35

## 2020-10-13 RX ADMIN — PANTOPRAZOLE SODIUM 40 MG: 40 INJECTION, POWDER, FOR SOLUTION INTRAVENOUS at 08:05

## 2020-10-13 RX ADMIN — PIPERACILLIN AND TAZOBACTAM 3.38 G: 3; .375 INJECTION, POWDER, LYOPHILIZED, FOR SOLUTION INTRAVENOUS at 17:32

## 2020-10-13 RX ADMIN — MORPHINE SULFATE 2 MG: 2 INJECTION, SOLUTION INTRAMUSCULAR; INTRAVENOUS at 10:27

## 2020-10-13 RX ADMIN — SODIUM CHLORIDE 125 ML/HR: 0.9 INJECTION, SOLUTION INTRAVENOUS at 22:37

## 2020-10-13 RX ADMIN — POTASSIUM CHLORIDE 20 MEQ: 200 INJECTION, SOLUTION INTRAVENOUS at 08:03

## 2020-10-13 RX ADMIN — PIPERACILLIN AND TAZOBACTAM 3.38 G: 3; .375 INJECTION, POWDER, LYOPHILIZED, FOR SOLUTION INTRAVENOUS at 12:44

## 2020-10-13 RX ADMIN — POTASSIUM CHLORIDE 20 MEQ: 200 INJECTION, SOLUTION INTRAVENOUS at 09:41

## 2020-10-13 RX ADMIN — HEPARIN SODIUM 5000 UNITS: 5000 INJECTION INTRAVENOUS; SUBCUTANEOUS at 06:00

## 2020-10-13 RX ADMIN — PIPERACILLIN AND TAZOBACTAM 3.38 G: 3; .375 INJECTION, POWDER, LYOPHILIZED, FOR SOLUTION INTRAVENOUS at 06:00

## 2020-10-14 PROBLEM — E87.6 HYPOKALEMIA: Status: ACTIVE | Noted: 2020-10-14

## 2020-10-14 LAB
ALBUMIN SERPL BCP-MCNC: 1.7 G/DL (ref 3.5–5)
ALP SERPL-CCNC: 42 U/L (ref 46–116)
ALT SERPL W P-5'-P-CCNC: 12 U/L (ref 12–78)
ANION GAP SERPL CALCULATED.3IONS-SCNC: 10 MMOL/L (ref 4–13)
AST SERPL W P-5'-P-CCNC: 12 U/L (ref 5–45)
BACTERIA BLD CULT: NORMAL
BACTERIA BLD CULT: NORMAL
BASOPHILS # BLD MANUAL: 0 THOUSAND/UL (ref 0–0.1)
BASOPHILS NFR MAR MANUAL: 0 % (ref 0–1)
BILIRUB SERPL-MCNC: 0.2 MG/DL (ref 0.2–1)
BUN SERPL-MCNC: 12 MG/DL (ref 5–25)
CALCIUM ALBUM COR SERPL-MCNC: 9.1 MG/DL (ref 8.3–10.1)
CALCIUM SERPL-MCNC: 7.3 MG/DL (ref 8.3–10.1)
CHLORIDE SERPL-SCNC: 109 MMOL/L (ref 100–108)
CO2 SERPL-SCNC: 22 MMOL/L (ref 21–32)
CREAT SERPL-MCNC: 0.64 MG/DL (ref 0.6–1.3)
EOSINOPHIL # BLD MANUAL: 0 THOUSAND/UL (ref 0–0.4)
EOSINOPHIL NFR BLD MANUAL: 0 % (ref 0–6)
ERYTHROCYTE [DISTWIDTH] IN BLOOD BY AUTOMATED COUNT: 13.5 % (ref 11.6–15.1)
GFR SERPL CREATININE-BSD FRML MDRD: 113 ML/MIN/1.73SQ M
GLUCOSE SERPL-MCNC: 114 MG/DL (ref 65–140)
HCT VFR BLD AUTO: 31 % (ref 36.5–49.3)
HGB BLD-MCNC: 10.5 G/DL (ref 12–17)
LYMPHOCYTES # BLD AUTO: 1.61 THOUSAND/UL (ref 0.6–4.47)
LYMPHOCYTES # BLD AUTO: 11 % (ref 14–44)
MAGNESIUM SERPL-MCNC: 2.2 MG/DL (ref 1.6–2.6)
MCH RBC QN AUTO: 32.2 PG (ref 26.8–34.3)
MCHC RBC AUTO-ENTMCNC: 33.9 G/DL (ref 31.4–37.4)
MCV RBC AUTO: 95 FL (ref 82–98)
METAMYELOCYTES NFR BLD MANUAL: 2 % (ref 0–1)
MONOCYTES # BLD AUTO: 1.61 THOUSAND/UL (ref 0–1.22)
MONOCYTES NFR BLD: 11 % (ref 4–12)
MYELOCYTES NFR BLD MANUAL: 1 % (ref 0–1)
NEUTROPHILS # BLD MANUAL: 10.97 THOUSAND/UL (ref 1.85–7.62)
NEUTS BAND NFR BLD MANUAL: 4 % (ref 0–8)
NEUTS SEG NFR BLD AUTO: 71 % (ref 43–75)
NRBC BLD AUTO-RTO: 0 /100 WBCS
PHOSPHATE SERPL-MCNC: 2.1 MG/DL (ref 2.7–4.5)
PLATELET # BLD AUTO: 296 THOUSANDS/UL (ref 149–390)
PLATELET BLD QL SMEAR: ADEQUATE
PMV BLD AUTO: 9 FL (ref 8.9–12.7)
POTASSIUM SERPL-SCNC: 3.1 MMOL/L (ref 3.5–5.3)
PROT SERPL-MCNC: 5.4 G/DL (ref 6.4–8.2)
RBC # BLD AUTO: 3.26 MILLION/UL (ref 3.88–5.62)
RBC MORPH BLD: NORMAL
SODIUM SERPL-SCNC: 141 MMOL/L (ref 136–145)
TOTAL CELLS COUNTED SPEC: 100
WBC # BLD AUTO: 14.62 THOUSAND/UL (ref 4.31–10.16)

## 2020-10-14 PROCEDURE — 85007 BL SMEAR W/DIFF WBC COUNT: CPT | Performed by: INTERNAL MEDICINE

## 2020-10-14 PROCEDURE — 99232 SBSQ HOSP IP/OBS MODERATE 35: CPT | Performed by: INTERNAL MEDICINE

## 2020-10-14 PROCEDURE — 84100 ASSAY OF PHOSPHORUS: CPT | Performed by: PHYSICIAN ASSISTANT

## 2020-10-14 PROCEDURE — 85027 COMPLETE CBC AUTOMATED: CPT | Performed by: INTERNAL MEDICINE

## 2020-10-14 PROCEDURE — 99024 POSTOP FOLLOW-UP VISIT: CPT | Performed by: PHYSICIAN ASSISTANT

## 2020-10-14 PROCEDURE — 90682 RIV4 VACC RECOMBINANT DNA IM: CPT | Performed by: SURGERY

## 2020-10-14 PROCEDURE — C9113 INJ PANTOPRAZOLE SODIUM, VIA: HCPCS | Performed by: PHYSICIAN ASSISTANT

## 2020-10-14 PROCEDURE — 83735 ASSAY OF MAGNESIUM: CPT | Performed by: PHYSICIAN ASSISTANT

## 2020-10-14 PROCEDURE — 90471 IMMUNIZATION ADMIN: CPT | Performed by: SURGERY

## 2020-10-14 PROCEDURE — 80053 COMPREHEN METABOLIC PANEL: CPT | Performed by: INTERNAL MEDICINE

## 2020-10-14 RX ORDER — LORAZEPAM 2 MG/ML
1 INJECTION INTRAMUSCULAR ONCE
Status: COMPLETED | OUTPATIENT
Start: 2020-10-14 | End: 2020-10-15

## 2020-10-14 RX ORDER — SODIUM CHLORIDE 9 MG/ML
50 INJECTION, SOLUTION INTRAVENOUS CONTINUOUS
Status: DISCONTINUED | OUTPATIENT
Start: 2020-10-14 | End: 2020-10-20

## 2020-10-14 RX ORDER — POTASSIUM CHLORIDE 14.9 MG/ML
20 INJECTION INTRAVENOUS
Status: COMPLETED | OUTPATIENT
Start: 2020-10-14 | End: 2020-10-15

## 2020-10-14 RX ORDER — POTASSIUM CHLORIDE 14.9 MG/ML
20 INJECTION INTRAVENOUS
Status: DISCONTINUED | OUTPATIENT
Start: 2020-10-14 | End: 2020-10-14

## 2020-10-14 RX ORDER — POTASSIUM CHLORIDE 20 MEQ/1
20 TABLET, EXTENDED RELEASE ORAL
Status: DISCONTINUED | OUTPATIENT
Start: 2020-10-14 | End: 2020-10-14

## 2020-10-14 RX ADMIN — HEPARIN SODIUM 5000 UNITS: 5000 INJECTION INTRAVENOUS; SUBCUTANEOUS at 06:35

## 2020-10-14 RX ADMIN — PIPERACILLIN AND TAZOBACTAM 3.38 G: 3; .375 INJECTION, POWDER, LYOPHILIZED, FOR SOLUTION INTRAVENOUS at 13:14

## 2020-10-14 RX ADMIN — INFLUENZA A VIRUS A/HAWAII/70/2019 (H1N1) RECOMBINANT HEMAGGLUTININ ANTIGEN, INFLUENZA A VIRUS A/MINNESOTA/41/2019 (H3N2) RECOMBINANT HEMAGGLUTININ ANTIGEN, INFLUENZA B VIRUS B/WASHINGTON/02/2019 RECOMBINANT HEMAGGLUTININ ANTIGEN, AND INFLUENZA B VIRUS B/PHUKET/3073/2013 RECOMBINANT HEMAGGLUTININ ANTIGEN 0.5 ML: 45; 45; 45; 45 INJECTION INTRAMUSCULAR at 18:24

## 2020-10-14 RX ADMIN — POTASSIUM CHLORIDE 10 MEQ: 200 INJECTION, SOLUTION INTRAVENOUS at 15:45

## 2020-10-14 RX ADMIN — HEPARIN SODIUM 5000 UNITS: 5000 INJECTION INTRAVENOUS; SUBCUTANEOUS at 13:56

## 2020-10-14 RX ADMIN — PIPERACILLIN AND TAZOBACTAM 3.38 G: 3; .375 INJECTION, POWDER, LYOPHILIZED, FOR SOLUTION INTRAVENOUS at 06:35

## 2020-10-14 RX ADMIN — PIPERACILLIN AND TAZOBACTAM 3.38 G: 3; .375 INJECTION, POWDER, LYOPHILIZED, FOR SOLUTION INTRAVENOUS at 18:24

## 2020-10-14 RX ADMIN — PIPERACILLIN AND TAZOBACTAM 3.38 G: 3; .375 INJECTION, POWDER, LYOPHILIZED, FOR SOLUTION INTRAVENOUS at 00:09

## 2020-10-14 RX ADMIN — POTASSIUM PHOSPHATE, MONOBASIC AND POTASSIUM PHOSPHATE, DIBASIC 12 MMOL: 224; 236 INJECTION, SOLUTION INTRAVENOUS at 09:05

## 2020-10-14 RX ADMIN — PANTOPRAZOLE SODIUM 40 MG: 40 INJECTION, POWDER, FOR SOLUTION INTRAVENOUS at 08:50

## 2020-10-14 RX ADMIN — HEPARIN SODIUM 5000 UNITS: 5000 INJECTION INTRAVENOUS; SUBCUTANEOUS at 21:30

## 2020-10-14 RX ADMIN — POTASSIUM CHLORIDE 20 MEQ: 200 INJECTION, SOLUTION INTRAVENOUS at 13:57

## 2020-10-15 LAB
ANION GAP SERPL CALCULATED.3IONS-SCNC: 10 MMOL/L (ref 4–13)
BACTERIA SPEC ANAEROBE CULT: ABNORMAL
BACTERIA SPEC ANAEROBE CULT: ABNORMAL
BASOPHILS # BLD MANUAL: 0 THOUSAND/UL (ref 0–0.1)
BASOPHILS NFR MAR MANUAL: 0 % (ref 0–1)
BUN SERPL-MCNC: 12 MG/DL (ref 5–25)
CALCIUM SERPL-MCNC: 7.3 MG/DL (ref 8.3–10.1)
CHLORIDE SERPL-SCNC: 109 MMOL/L (ref 100–108)
CO2 SERPL-SCNC: 23 MMOL/L (ref 21–32)
CREAT SERPL-MCNC: 0.68 MG/DL (ref 0.6–1.3)
EOSINOPHIL # BLD MANUAL: 0.12 THOUSAND/UL (ref 0–0.4)
EOSINOPHIL NFR BLD MANUAL: 1 % (ref 0–6)
ERYTHROCYTE [DISTWIDTH] IN BLOOD BY AUTOMATED COUNT: 13.5 % (ref 11.6–15.1)
GFR SERPL CREATININE-BSD FRML MDRD: 111 ML/MIN/1.73SQ M
GLUCOSE SERPL-MCNC: 91 MG/DL (ref 65–140)
HCT VFR BLD AUTO: 30.8 % (ref 36.5–49.3)
HGB BLD-MCNC: 10.4 G/DL (ref 12–17)
LYMPHOCYTES # BLD AUTO: 1.55 THOUSAND/UL (ref 0.6–4.47)
LYMPHOCYTES # BLD AUTO: 13 % (ref 14–44)
MCH RBC QN AUTO: 32.1 PG (ref 26.8–34.3)
MCHC RBC AUTO-ENTMCNC: 33.8 G/DL (ref 31.4–37.4)
MCV RBC AUTO: 95 FL (ref 82–98)
MONOCYTES # BLD AUTO: 1.43 THOUSAND/UL (ref 0–1.22)
MONOCYTES NFR BLD: 12 % (ref 4–12)
MYELOCYTES NFR BLD MANUAL: 4 % (ref 0–1)
NEUTROPHILS # BLD MANUAL: 8.35 THOUSAND/UL (ref 1.85–7.62)
NEUTS BAND NFR BLD MANUAL: 2 % (ref 0–8)
NEUTS SEG NFR BLD AUTO: 68 % (ref 43–75)
NRBC BLD AUTO-RTO: 0 /100 WBCS
PHOSPHATE SERPL-MCNC: 2.5 MG/DL (ref 2.7–4.5)
PLATELET # BLD AUTO: 326 THOUSANDS/UL (ref 149–390)
PLATELET BLD QL SMEAR: ADEQUATE
PMV BLD AUTO: 9.7 FL (ref 8.9–12.7)
POLYCHROMASIA BLD QL SMEAR: PRESENT
POTASSIUM SERPL-SCNC: 3.3 MMOL/L (ref 3.5–5.3)
RBC # BLD AUTO: 3.24 MILLION/UL (ref 3.88–5.62)
SODIUM SERPL-SCNC: 142 MMOL/L (ref 136–145)
TOTAL CELLS COUNTED SPEC: 100
TOXIC GRANULES BLD QL SMEAR: PRESENT
WBC # BLD AUTO: 11.93 THOUSAND/UL (ref 4.31–10.16)

## 2020-10-15 PROCEDURE — C9113 INJ PANTOPRAZOLE SODIUM, VIA: HCPCS | Performed by: PHYSICIAN ASSISTANT

## 2020-10-15 PROCEDURE — 80048 BASIC METABOLIC PNL TOTAL CA: CPT | Performed by: INTERNAL MEDICINE

## 2020-10-15 PROCEDURE — 99024 POSTOP FOLLOW-UP VISIT: CPT | Performed by: PHYSICIAN ASSISTANT

## 2020-10-15 PROCEDURE — 84100 ASSAY OF PHOSPHORUS: CPT | Performed by: INTERNAL MEDICINE

## 2020-10-15 PROCEDURE — 85007 BL SMEAR W/DIFF WBC COUNT: CPT | Performed by: INTERNAL MEDICINE

## 2020-10-15 PROCEDURE — 85027 COMPLETE CBC AUTOMATED: CPT | Performed by: INTERNAL MEDICINE

## 2020-10-15 PROCEDURE — 99232 SBSQ HOSP IP/OBS MODERATE 35: CPT | Performed by: INTERNAL MEDICINE

## 2020-10-15 RX ORDER — LORAZEPAM 2 MG/ML
1 INJECTION INTRAMUSCULAR ONCE
Status: COMPLETED | OUTPATIENT
Start: 2020-10-15 | End: 2020-10-15

## 2020-10-15 RX ORDER — POTASSIUM CHLORIDE 14.9 MG/ML
20 INJECTION INTRAVENOUS ONCE
Status: COMPLETED | OUTPATIENT
Start: 2020-10-15 | End: 2020-10-16

## 2020-10-15 RX ADMIN — MORPHINE SULFATE 2 MG: 2 INJECTION, SOLUTION INTRAMUSCULAR; INTRAVENOUS at 20:18

## 2020-10-15 RX ADMIN — HEPARIN SODIUM 5000 UNITS: 5000 INJECTION INTRAVENOUS; SUBCUTANEOUS at 14:43

## 2020-10-15 RX ADMIN — MORPHINE SULFATE 2 MG: 2 INJECTION, SOLUTION INTRAMUSCULAR; INTRAVENOUS at 10:31

## 2020-10-15 RX ADMIN — LORAZEPAM 1 MG: 2 INJECTION INTRAMUSCULAR; INTRAVENOUS at 23:40

## 2020-10-15 RX ADMIN — PIPERACILLIN AND TAZOBACTAM 3.38 G: 3; .375 INJECTION, POWDER, LYOPHILIZED, FOR SOLUTION INTRAVENOUS at 17:32

## 2020-10-15 RX ADMIN — LORAZEPAM 1 MG: 2 INJECTION INTRAMUSCULAR; INTRAVENOUS at 00:26

## 2020-10-15 RX ADMIN — MORPHINE SULFATE 2 MG: 2 INJECTION, SOLUTION INTRAMUSCULAR; INTRAVENOUS at 14:52

## 2020-10-15 RX ADMIN — Medication 1 SPRAY: at 12:09

## 2020-10-15 RX ADMIN — PIPERACILLIN AND TAZOBACTAM 3.38 G: 3; .375 INJECTION, POWDER, LYOPHILIZED, FOR SOLUTION INTRAVENOUS at 00:26

## 2020-10-15 RX ADMIN — POTASSIUM CHLORIDE 20 MEQ: 14.9 INJECTION, SOLUTION INTRAVENOUS at 20:19

## 2020-10-15 RX ADMIN — SODIUM CHLORIDE 75 ML/HR: 0.9 INJECTION, SOLUTION INTRAVENOUS at 08:42

## 2020-10-15 RX ADMIN — HYDROMORPHONE HYDROCHLORIDE 0.5 MG: 1 INJECTION, SOLUTION INTRAMUSCULAR; INTRAVENOUS; SUBCUTANEOUS at 17:31

## 2020-10-15 RX ADMIN — PANTOPRAZOLE SODIUM 40 MG: 40 INJECTION, POWDER, FOR SOLUTION INTRAVENOUS at 09:03

## 2020-10-15 RX ADMIN — PIPERACILLIN AND TAZOBACTAM 3.38 G: 3; .375 INJECTION, POWDER, LYOPHILIZED, FOR SOLUTION INTRAVENOUS at 12:09

## 2020-10-15 RX ADMIN — HEPARIN SODIUM 5000 UNITS: 5000 INJECTION INTRAVENOUS; SUBCUTANEOUS at 05:29

## 2020-10-15 RX ADMIN — PIPERACILLIN AND TAZOBACTAM 3.38 G: 3; .375 INJECTION, POWDER, LYOPHILIZED, FOR SOLUTION INTRAVENOUS at 23:39

## 2020-10-15 RX ADMIN — SODIUM CHLORIDE 75 ML/HR: 0.9 INJECTION, SOLUTION INTRAVENOUS at 22:10

## 2020-10-15 RX ADMIN — HEPARIN SODIUM 5000 UNITS: 5000 INJECTION INTRAVENOUS; SUBCUTANEOUS at 22:10

## 2020-10-15 RX ADMIN — PIPERACILLIN AND TAZOBACTAM 3.38 G: 3; .375 INJECTION, POWDER, LYOPHILIZED, FOR SOLUTION INTRAVENOUS at 05:38

## 2020-10-16 ENCOUNTER — TELEPHONE (OUTPATIENT)
Dept: INTERVENTIONAL RADIOLOGY/VASCULAR | Facility: HOSPITAL | Age: 50
End: 2020-10-16

## 2020-10-16 ENCOUNTER — APPOINTMENT (INPATIENT)
Dept: INTERVENTIONAL RADIOLOGY/VASCULAR | Facility: HOSPITAL | Age: 50
DRG: 853 | End: 2020-10-16
Attending: RADIOLOGY
Payer: COMMERCIAL

## 2020-10-16 ENCOUNTER — APPOINTMENT (INPATIENT)
Dept: RADIOLOGY | Facility: HOSPITAL | Age: 50
DRG: 853 | End: 2020-10-16
Payer: COMMERCIAL

## 2020-10-16 LAB
ALBUMIN SERPL BCP-MCNC: 1.9 G/DL (ref 3.5–5)
ALP SERPL-CCNC: 57 U/L (ref 46–116)
ALT SERPL W P-5'-P-CCNC: 8 U/L (ref 12–78)
ANION GAP SERPL CALCULATED.3IONS-SCNC: 18 MMOL/L (ref 4–13)
AST SERPL W P-5'-P-CCNC: 12 U/L (ref 5–45)
BASOPHILS # BLD MANUAL: 0 THOUSAND/UL (ref 0–0.1)
BASOPHILS NFR MAR MANUAL: 0 % (ref 0–1)
BILIRUB DIRECT SERPL-MCNC: 0.28 MG/DL (ref 0–0.2)
BILIRUB SERPL-MCNC: 0.6 MG/DL (ref 0.2–1)
BUN SERPL-MCNC: 12 MG/DL (ref 5–25)
CALCIUM SERPL-MCNC: 7.6 MG/DL (ref 8.3–10.1)
CHLORIDE SERPL-SCNC: 106 MMOL/L (ref 100–108)
CO2 SERPL-SCNC: 17 MMOL/L (ref 21–32)
CREAT SERPL-MCNC: 0.83 MG/DL (ref 0.6–1.3)
EOSINOPHIL # BLD MANUAL: 0 THOUSAND/UL (ref 0–0.4)
EOSINOPHIL NFR BLD MANUAL: 0 % (ref 0–6)
ERYTHROCYTE [DISTWIDTH] IN BLOOD BY AUTOMATED COUNT: 13.7 % (ref 11.6–15.1)
GFR SERPL CREATININE-BSD FRML MDRD: 102 ML/MIN/1.73SQ M
GLUCOSE SERPL-MCNC: 105 MG/DL (ref 65–140)
HCT VFR BLD AUTO: 41.9 % (ref 36.5–49.3)
HGB BLD-MCNC: 14.1 G/DL (ref 12–17)
LYMPHOCYTES # BLD AUTO: 1.71 THOUSAND/UL (ref 0.6–4.47)
LYMPHOCYTES # BLD AUTO: 6 % (ref 14–44)
MAGNESIUM SERPL-MCNC: 2.1 MG/DL (ref 1.6–2.6)
MCH RBC QN AUTO: 31.9 PG (ref 26.8–34.3)
MCHC RBC AUTO-ENTMCNC: 33.7 G/DL (ref 31.4–37.4)
MCV RBC AUTO: 95 FL (ref 82–98)
MONOCYTES # BLD AUTO: 0.57 THOUSAND/UL (ref 0–1.22)
MONOCYTES NFR BLD: 2 % (ref 4–12)
NEUTROPHILS # BLD MANUAL: 25.86 THOUSAND/UL (ref 1.85–7.62)
NEUTS BAND NFR BLD MANUAL: 3 % (ref 0–8)
NEUTS SEG NFR BLD AUTO: 88 % (ref 43–75)
NRBC BLD AUTO-RTO: 0 /100 WBCS
NRBC BLD AUTO-RTO: 1 /100 WBC (ref 0–2)
PHOSPHATE SERPL-MCNC: 3.7 MG/DL (ref 2.7–4.5)
PLATELET # BLD AUTO: 545 THOUSANDS/UL (ref 149–390)
PLATELET BLD QL SMEAR: ABNORMAL
PMV BLD AUTO: 9.5 FL (ref 8.9–12.7)
POTASSIUM SERPL-SCNC: 3.1 MMOL/L (ref 3.5–5.3)
PREALB SERPL-MCNC: 7.1 MG/DL (ref 18–40)
PROT SERPL-MCNC: 5.9 G/DL (ref 6.4–8.2)
RBC # BLD AUTO: 4.42 MILLION/UL (ref 3.88–5.62)
SODIUM SERPL-SCNC: 141 MMOL/L (ref 136–145)
TOTAL CELLS COUNTED SPEC: 100
VARIANT LYMPHS # BLD AUTO: 1 %
WBC # BLD AUTO: 28.42 THOUSAND/UL (ref 4.31–10.16)

## 2020-10-16 PROCEDURE — C1751 CATH, INF, PER/CENT/MIDLINE: HCPCS

## 2020-10-16 PROCEDURE — 76937 US GUIDE VASCULAR ACCESS: CPT

## 2020-10-16 PROCEDURE — NC001 PR NO CHARGE: Performed by: RADIOLOGY

## 2020-10-16 PROCEDURE — 02HV33Z INSERTION OF INFUSION DEVICE INTO SUPERIOR VENA CAVA, PERCUTANEOUS APPROACH: ICD-10-PCS | Performed by: SURGERY

## 2020-10-16 PROCEDURE — 84100 ASSAY OF PHOSPHORUS: CPT | Performed by: PHYSICIAN ASSISTANT

## 2020-10-16 PROCEDURE — 77001 FLUOROGUIDE FOR VEIN DEVICE: CPT

## 2020-10-16 PROCEDURE — 83735 ASSAY OF MAGNESIUM: CPT | Performed by: PHYSICIAN ASSISTANT

## 2020-10-16 PROCEDURE — 80076 HEPATIC FUNCTION PANEL: CPT | Performed by: PHYSICIAN ASSISTANT

## 2020-10-16 PROCEDURE — 3E0436Z INTRODUCTION OF NUTRITIONAL SUBSTANCE INTO CENTRAL VEIN, PERCUTANEOUS APPROACH: ICD-10-PCS | Performed by: SURGERY

## 2020-10-16 PROCEDURE — 85007 BL SMEAR W/DIFF WBC COUNT: CPT | Performed by: INTERNAL MEDICINE

## 2020-10-16 PROCEDURE — C9113 INJ PANTOPRAZOLE SODIUM, VIA: HCPCS | Performed by: PHYSICIAN ASSISTANT

## 2020-10-16 PROCEDURE — 85027 COMPLETE CBC AUTOMATED: CPT | Performed by: INTERNAL MEDICINE

## 2020-10-16 PROCEDURE — 99232 SBSQ HOSP IP/OBS MODERATE 35: CPT | Performed by: INTERNAL MEDICINE

## 2020-10-16 PROCEDURE — 84134 ASSAY OF PREALBUMIN: CPT | Performed by: PHYSICIAN ASSISTANT

## 2020-10-16 PROCEDURE — 36573 INSJ PICC RS&I 5 YR+: CPT

## 2020-10-16 PROCEDURE — 74022 RADEX COMPL AQT ABD SERIES: CPT

## 2020-10-16 PROCEDURE — 99024 POSTOP FOLLOW-UP VISIT: CPT | Performed by: PHYSICIAN ASSISTANT

## 2020-10-16 PROCEDURE — 80048 BASIC METABOLIC PNL TOTAL CA: CPT | Performed by: PHYSICIAN ASSISTANT

## 2020-10-16 RX ORDER — POTASSIUM CHLORIDE 29.8 MG/ML
40 INJECTION INTRAVENOUS ONCE
Status: DISCONTINUED | OUTPATIENT
Start: 2020-10-16 | End: 2020-10-16

## 2020-10-16 RX ORDER — POTASSIUM CHLORIDE 14.9 MG/ML
20 INJECTION INTRAVENOUS
Status: COMPLETED | OUTPATIENT
Start: 2020-10-16 | End: 2020-10-16

## 2020-10-16 RX ORDER — LANOLIN ALCOHOL/MO/W.PET/CERES
6 CREAM (GRAM) TOPICAL
Status: DISCONTINUED | OUTPATIENT
Start: 2020-10-16 | End: 2020-11-07 | Stop reason: HOSPADM

## 2020-10-16 RX ADMIN — POTASSIUM PHOSPHATE, MONOBASIC AND POTASSIUM PHOSPHATE, DIBASIC 12 MMOL: 224; 236 INJECTION, SOLUTION INTRAVENOUS at 08:58

## 2020-10-16 RX ADMIN — Medication: at 21:11

## 2020-10-16 RX ADMIN — PANTOPRAZOLE SODIUM 40 MG: 40 INJECTION, POWDER, FOR SOLUTION INTRAVENOUS at 08:58

## 2020-10-16 RX ADMIN — POTASSIUM CHLORIDE 20 MEQ: 200 INJECTION, SOLUTION INTRAVENOUS at 12:49

## 2020-10-16 RX ADMIN — SODIUM CHLORIDE 75 ML/HR: 0.9 INJECTION, SOLUTION INTRAVENOUS at 16:47

## 2020-10-16 RX ADMIN — HEPARIN SODIUM 5000 UNITS: 5000 INJECTION INTRAVENOUS; SUBCUTANEOUS at 22:27

## 2020-10-16 RX ADMIN — HEPARIN SODIUM 5000 UNITS: 5000 INJECTION INTRAVENOUS; SUBCUTANEOUS at 14:24

## 2020-10-16 RX ADMIN — PIPERACILLIN AND TAZOBACTAM 3.38 G: 3; .375 INJECTION, POWDER, LYOPHILIZED, FOR SOLUTION INTRAVENOUS at 23:57

## 2020-10-16 RX ADMIN — PIPERACILLIN AND TAZOBACTAM 3.38 G: 3; .375 INJECTION, POWDER, LYOPHILIZED, FOR SOLUTION INTRAVENOUS at 12:45

## 2020-10-16 RX ADMIN — MELATONIN TAB 3 MG 6 MG: 3 TAB at 23:57

## 2020-10-16 RX ADMIN — POTASSIUM CHLORIDE 20 MEQ: 200 INJECTION, SOLUTION INTRAVENOUS at 14:24

## 2020-10-16 RX ADMIN — PIPERACILLIN AND TAZOBACTAM 3.38 G: 3; .375 INJECTION, POWDER, LYOPHILIZED, FOR SOLUTION INTRAVENOUS at 17:41

## 2020-10-17 ENCOUNTER — APPOINTMENT (INPATIENT)
Dept: RADIOLOGY | Facility: HOSPITAL | Age: 50
DRG: 853 | End: 2020-10-17
Payer: COMMERCIAL

## 2020-10-17 LAB
ALBUMIN SERPL BCP-MCNC: 1.4 G/DL (ref 3.5–5)
ALP SERPL-CCNC: 48 U/L (ref 46–116)
ALT SERPL W P-5'-P-CCNC: 9 U/L (ref 12–78)
ANION GAP SERPL CALCULATED.3IONS-SCNC: 8 MMOL/L (ref 4–13)
AST SERPL W P-5'-P-CCNC: 11 U/L (ref 5–45)
BACTERIA BLD CULT: NORMAL
BACTERIA BLD CULT: NORMAL
BILIRUB SERPL-MCNC: 0.3 MG/DL (ref 0.2–1)
BUN SERPL-MCNC: 11 MG/DL (ref 5–25)
CALCIUM ALBUM COR SERPL-MCNC: 9 MG/DL (ref 8.3–10.1)
CALCIUM SERPL-MCNC: 6.9 MG/DL (ref 8.3–10.1)
CHLORIDE SERPL-SCNC: 108 MMOL/L (ref 100–108)
CO2 SERPL-SCNC: 24 MMOL/L (ref 21–32)
CREAT SERPL-MCNC: 0.67 MG/DL (ref 0.6–1.3)
ERYTHROCYTE [DISTWIDTH] IN BLOOD BY AUTOMATED COUNT: 13.7 % (ref 11.6–15.1)
GFR SERPL CREATININE-BSD FRML MDRD: 111 ML/MIN/1.73SQ M
GLUCOSE SERPL-MCNC: 140 MG/DL (ref 65–140)
HCT VFR BLD AUTO: 30.8 % (ref 36.5–49.3)
HGB BLD-MCNC: 10.5 G/DL (ref 12–17)
MAGNESIUM SERPL-MCNC: 1.6 MG/DL (ref 1.6–2.6)
MCH RBC QN AUTO: 31.9 PG (ref 26.8–34.3)
MCHC RBC AUTO-ENTMCNC: 34.1 G/DL (ref 31.4–37.4)
MCV RBC AUTO: 94 FL (ref 82–98)
NRBC BLD AUTO-RTO: 0 /100 WBCS
PHOSPHATE SERPL-MCNC: 2.1 MG/DL (ref 2.7–4.5)
PLATELET # BLD AUTO: 376 THOUSANDS/UL (ref 149–390)
PMV BLD AUTO: 9.2 FL (ref 8.9–12.7)
POTASSIUM SERPL-SCNC: 3.3 MMOL/L (ref 3.5–5.3)
PROT SERPL-MCNC: 4.8 G/DL (ref 6.4–8.2)
RBC # BLD AUTO: 3.29 MILLION/UL (ref 3.88–5.62)
SODIUM SERPL-SCNC: 140 MMOL/L (ref 136–145)
WBC # BLD AUTO: 25.01 THOUSAND/UL (ref 4.31–10.16)

## 2020-10-17 PROCEDURE — 85025 COMPLETE CBC W/AUTO DIFF WBC: CPT | Performed by: INTERNAL MEDICINE

## 2020-10-17 PROCEDURE — 80053 COMPREHEN METABOLIC PANEL: CPT | Performed by: PHYSICIAN ASSISTANT

## 2020-10-17 PROCEDURE — 83735 ASSAY OF MAGNESIUM: CPT | Performed by: PHYSICIAN ASSISTANT

## 2020-10-17 PROCEDURE — C9113 INJ PANTOPRAZOLE SODIUM, VIA: HCPCS | Performed by: PHYSICIAN ASSISTANT

## 2020-10-17 PROCEDURE — 74022 RADEX COMPL AQT ABD SERIES: CPT

## 2020-10-17 PROCEDURE — 84100 ASSAY OF PHOSPHORUS: CPT | Performed by: PHYSICIAN ASSISTANT

## 2020-10-17 PROCEDURE — 99232 SBSQ HOSP IP/OBS MODERATE 35: CPT | Performed by: INTERNAL MEDICINE

## 2020-10-17 PROCEDURE — 99024 POSTOP FOLLOW-UP VISIT: CPT | Performed by: PHYSICIAN ASSISTANT

## 2020-10-17 RX ORDER — POTASSIUM CHLORIDE 14.9 MG/ML
20 INJECTION INTRAVENOUS ONCE
Status: COMPLETED | OUTPATIENT
Start: 2020-10-17 | End: 2020-10-17

## 2020-10-17 RX ADMIN — HEPARIN SODIUM 5000 UNITS: 5000 INJECTION INTRAVENOUS; SUBCUTANEOUS at 21:33

## 2020-10-17 RX ADMIN — PANTOPRAZOLE SODIUM 40 MG: 40 INJECTION, POWDER, FOR SOLUTION INTRAVENOUS at 08:34

## 2020-10-17 RX ADMIN — HEPARIN SODIUM 5000 UNITS: 5000 INJECTION INTRAVENOUS; SUBCUTANEOUS at 06:38

## 2020-10-17 RX ADMIN — PIPERACILLIN AND TAZOBACTAM 3.38 G: 3; .375 INJECTION, POWDER, LYOPHILIZED, FOR SOLUTION INTRAVENOUS at 06:39

## 2020-10-17 RX ADMIN — POTASSIUM PHOSPHATE, MONOBASIC AND POTASSIUM PHOSPHATE, DIBASIC 12 MMOL: 224; 236 INJECTION, SOLUTION INTRAVENOUS at 11:59

## 2020-10-17 RX ADMIN — HEPARIN SODIUM 5000 UNITS: 5000 INJECTION INTRAVENOUS; SUBCUTANEOUS at 13:05

## 2020-10-17 RX ADMIN — POTASSIUM CHLORIDE 20 MEQ: 200 INJECTION, SOLUTION INTRAVENOUS at 08:32

## 2020-10-17 RX ADMIN — PIPERACILLIN AND TAZOBACTAM 3.38 G: 3; .375 INJECTION, POWDER, LYOPHILIZED, FOR SOLUTION INTRAVENOUS at 18:43

## 2020-10-17 RX ADMIN — Medication: at 21:21

## 2020-10-17 RX ADMIN — SODIUM CHLORIDE 75 ML/HR: 0.9 INJECTION, SOLUTION INTRAVENOUS at 15:57

## 2020-10-17 RX ADMIN — PIPERACILLIN AND TAZOBACTAM 3.38 G: 3; .375 INJECTION, POWDER, LYOPHILIZED, FOR SOLUTION INTRAVENOUS at 11:59

## 2020-10-17 RX ADMIN — PIPERACILLIN AND TAZOBACTAM 3.38 G: 3; .375 INJECTION, POWDER, LYOPHILIZED, FOR SOLUTION INTRAVENOUS at 23:40

## 2020-10-17 RX ADMIN — MELATONIN TAB 3 MG 6 MG: 3 TAB at 21:33

## 2020-10-18 LAB
ANION GAP SERPL CALCULATED.3IONS-SCNC: 5 MMOL/L (ref 4–13)
BASOPHILS # BLD AUTO: 0.04 THOUSANDS/ΜL (ref 0–0.1)
BASOPHILS NFR BLD AUTO: 0 % (ref 0–1)
BUN SERPL-MCNC: 5 MG/DL (ref 5–25)
CALCIUM SERPL-MCNC: 7.2 MG/DL (ref 8.3–10.1)
CHLORIDE SERPL-SCNC: 105 MMOL/L (ref 100–108)
CO2 SERPL-SCNC: 27 MMOL/L (ref 21–32)
CREAT SERPL-MCNC: 0.63 MG/DL (ref 0.6–1.3)
EOSINOPHIL # BLD AUTO: 0.09 THOUSAND/ΜL (ref 0–0.61)
EOSINOPHIL NFR BLD AUTO: 1 % (ref 0–6)
ERYTHROCYTE [DISTWIDTH] IN BLOOD BY AUTOMATED COUNT: 14 % (ref 11.6–15.1)
GFR SERPL CREATININE-BSD FRML MDRD: 114 ML/MIN/1.73SQ M
GLUCOSE SERPL-MCNC: 252 MG/DL (ref 65–140)
HCT VFR BLD AUTO: 30.2 % (ref 36.5–49.3)
HGB BLD-MCNC: 10.2 G/DL (ref 12–17)
IMM GRANULOCYTES # BLD AUTO: 0.15 THOUSAND/UL (ref 0–0.2)
IMM GRANULOCYTES NFR BLD AUTO: 1 % (ref 0–2)
LYMPHOCYTES # BLD AUTO: 1.02 THOUSANDS/ΜL (ref 0.6–4.47)
LYMPHOCYTES NFR BLD AUTO: 5 % (ref 14–44)
MAGNESIUM SERPL-MCNC: 1.8 MG/DL (ref 1.6–2.6)
MCH RBC QN AUTO: 32.2 PG (ref 26.8–34.3)
MCHC RBC AUTO-ENTMCNC: 33.8 G/DL (ref 31.4–37.4)
MCV RBC AUTO: 95 FL (ref 82–98)
MONOCYTES # BLD AUTO: 1.12 THOUSAND/ΜL (ref 0.17–1.22)
MONOCYTES NFR BLD AUTO: 6 % (ref 4–12)
NEUTROPHILS # BLD AUTO: 16.31 THOUSANDS/ΜL (ref 1.85–7.62)
NEUTS SEG NFR BLD AUTO: 87 % (ref 43–75)
NRBC BLD AUTO-RTO: 0 /100 WBCS
PHOSPHATE SERPL-MCNC: 4.8 MG/DL (ref 2.7–4.5)
PLATELET # BLD AUTO: 394 THOUSANDS/UL (ref 149–390)
PMV BLD AUTO: 9.3 FL (ref 8.9–12.7)
POTASSIUM SERPL-SCNC: 4.1 MMOL/L (ref 3.5–5.3)
RBC # BLD AUTO: 3.17 MILLION/UL (ref 3.88–5.62)
SODIUM SERPL-SCNC: 137 MMOL/L (ref 136–145)
WBC # BLD AUTO: 18.73 THOUSAND/UL (ref 4.31–10.16)

## 2020-10-18 PROCEDURE — C9113 INJ PANTOPRAZOLE SODIUM, VIA: HCPCS | Performed by: PHYSICIAN ASSISTANT

## 2020-10-18 PROCEDURE — 84100 ASSAY OF PHOSPHORUS: CPT | Performed by: INTERNAL MEDICINE

## 2020-10-18 PROCEDURE — 83735 ASSAY OF MAGNESIUM: CPT | Performed by: PHYSICIAN ASSISTANT

## 2020-10-18 PROCEDURE — 99024 POSTOP FOLLOW-UP VISIT: CPT | Performed by: PHYSICIAN ASSISTANT

## 2020-10-18 PROCEDURE — 80048 BASIC METABOLIC PNL TOTAL CA: CPT | Performed by: INTERNAL MEDICINE

## 2020-10-18 PROCEDURE — 99232 SBSQ HOSP IP/OBS MODERATE 35: CPT | Performed by: INTERNAL MEDICINE

## 2020-10-18 PROCEDURE — 85025 COMPLETE CBC W/AUTO DIFF WBC: CPT | Performed by: INTERNAL MEDICINE

## 2020-10-18 RX ADMIN — HEPARIN SODIUM 5000 UNITS: 5000 INJECTION INTRAVENOUS; SUBCUTANEOUS at 05:58

## 2020-10-18 RX ADMIN — HEPARIN SODIUM 5000 UNITS: 5000 INJECTION INTRAVENOUS; SUBCUTANEOUS at 14:07

## 2020-10-18 RX ADMIN — Medication: at 21:27

## 2020-10-18 RX ADMIN — PANTOPRAZOLE SODIUM 40 MG: 40 INJECTION, POWDER, FOR SOLUTION INTRAVENOUS at 10:00

## 2020-10-18 RX ADMIN — PIPERACILLIN AND TAZOBACTAM 3.38 G: 3; .375 INJECTION, POWDER, LYOPHILIZED, FOR SOLUTION INTRAVENOUS at 18:31

## 2020-10-18 RX ADMIN — SODIUM CHLORIDE 50 ML/HR: 0.9 INJECTION, SOLUTION INTRAVENOUS at 17:43

## 2020-10-18 RX ADMIN — HEPARIN SODIUM 5000 UNITS: 5000 INJECTION INTRAVENOUS; SUBCUTANEOUS at 21:27

## 2020-10-18 RX ADMIN — MELATONIN TAB 3 MG 6 MG: 3 TAB at 21:27

## 2020-10-18 RX ADMIN — PIPERACILLIN AND TAZOBACTAM 3.38 G: 3; .375 INJECTION, POWDER, LYOPHILIZED, FOR SOLUTION INTRAVENOUS at 05:57

## 2020-10-18 RX ADMIN — PIPERACILLIN AND TAZOBACTAM 3.38 G: 3; .375 INJECTION, POWDER, LYOPHILIZED, FOR SOLUTION INTRAVENOUS at 13:30

## 2020-10-19 PROBLEM — E63.9 IMPAIRED NUTRITION: Status: ACTIVE | Noted: 2020-10-19

## 2020-10-19 LAB
ANION GAP SERPL CALCULATED.3IONS-SCNC: 6 MMOL/L (ref 4–13)
ANION GAP SERPL CALCULATED.3IONS-SCNC: 7 MMOL/L (ref 4–13)
BASOPHILS # BLD AUTO: 0.04 THOUSANDS/ΜL (ref 0–0.1)
BASOPHILS NFR BLD AUTO: 0 % (ref 0–1)
BUN SERPL-MCNC: 4 MG/DL (ref 5–25)
BUN SERPL-MCNC: 7 MG/DL (ref 5–25)
CALCIUM SERPL-MCNC: 7 MG/DL (ref 8.3–10.1)
CALCIUM SERPL-MCNC: 7.1 MG/DL (ref 8.3–10.1)
CHLORIDE SERPL-SCNC: 102 MMOL/L (ref 100–108)
CHLORIDE SERPL-SCNC: 103 MMOL/L (ref 100–108)
CO2 SERPL-SCNC: 26 MMOL/L (ref 21–32)
CO2 SERPL-SCNC: 26 MMOL/L (ref 21–32)
CREAT SERPL-MCNC: 0.55 MG/DL (ref 0.6–1.3)
CREAT SERPL-MCNC: 0.72 MG/DL (ref 0.6–1.3)
EOSINOPHIL # BLD AUTO: 0.05 THOUSAND/ΜL (ref 0–0.61)
EOSINOPHIL NFR BLD AUTO: 0 % (ref 0–6)
ERYTHROCYTE [DISTWIDTH] IN BLOOD BY AUTOMATED COUNT: 13.4 % (ref 11.6–15.1)
GFR SERPL CREATININE-BSD FRML MDRD: 108 ML/MIN/1.73SQ M
GFR SERPL CREATININE-BSD FRML MDRD: 121 ML/MIN/1.73SQ M
GLUCOSE SERPL-MCNC: 115 MG/DL (ref 65–140)
GLUCOSE SERPL-MCNC: 133 MG/DL (ref 65–140)
HCT VFR BLD AUTO: 31.5 % (ref 36.5–49.3)
HGB BLD-MCNC: 10.7 G/DL (ref 12–17)
IMM GRANULOCYTES # BLD AUTO: 0.13 THOUSAND/UL (ref 0–0.2)
IMM GRANULOCYTES NFR BLD AUTO: 1 % (ref 0–2)
LYMPHOCYTES # BLD AUTO: 0.95 THOUSANDS/ΜL (ref 0.6–4.47)
LYMPHOCYTES NFR BLD AUTO: 5 % (ref 14–44)
MAGNESIUM SERPL-MCNC: 1.7 MG/DL (ref 1.6–2.6)
MCH RBC QN AUTO: 31.5 PG (ref 26.8–34.3)
MCHC RBC AUTO-ENTMCNC: 34 G/DL (ref 31.4–37.4)
MCV RBC AUTO: 93 FL (ref 82–98)
MONOCYTES # BLD AUTO: 1.03 THOUSAND/ΜL (ref 0.17–1.22)
MONOCYTES NFR BLD AUTO: 6 % (ref 4–12)
NEUTROPHILS # BLD AUTO: 15.53 THOUSANDS/ΜL (ref 1.85–7.62)
NEUTS SEG NFR BLD AUTO: 88 % (ref 43–75)
NRBC BLD AUTO-RTO: 0 /100 WBCS
PHOSPHATE SERPL-MCNC: 2.7 MG/DL (ref 2.7–4.5)
PLATELET # BLD AUTO: 390 THOUSANDS/UL (ref 149–390)
PMV BLD AUTO: 9.3 FL (ref 8.9–12.7)
POTASSIUM SERPL-SCNC: 2.9 MMOL/L (ref 3.5–5.3)
POTASSIUM SERPL-SCNC: 3.4 MMOL/L (ref 3.5–5.3)
RBC # BLD AUTO: 3.4 MILLION/UL (ref 3.88–5.62)
SODIUM SERPL-SCNC: 134 MMOL/L (ref 136–145)
SODIUM SERPL-SCNC: 136 MMOL/L (ref 136–145)
WBC # BLD AUTO: 17.73 THOUSAND/UL (ref 4.31–10.16)

## 2020-10-19 PROCEDURE — 83735 ASSAY OF MAGNESIUM: CPT | Performed by: PHYSICIAN ASSISTANT

## 2020-10-19 PROCEDURE — 80048 BASIC METABOLIC PNL TOTAL CA: CPT | Performed by: INTERNAL MEDICINE

## 2020-10-19 PROCEDURE — 85025 COMPLETE CBC W/AUTO DIFF WBC: CPT | Performed by: INTERNAL MEDICINE

## 2020-10-19 PROCEDURE — 84100 ASSAY OF PHOSPHORUS: CPT | Performed by: PHYSICIAN ASSISTANT

## 2020-10-19 PROCEDURE — 99232 SBSQ HOSP IP/OBS MODERATE 35: CPT | Performed by: INTERNAL MEDICINE

## 2020-10-19 PROCEDURE — C9113 INJ PANTOPRAZOLE SODIUM, VIA: HCPCS | Performed by: PHYSICIAN ASSISTANT

## 2020-10-19 PROCEDURE — 99024 POSTOP FOLLOW-UP VISIT: CPT | Performed by: PHYSICIAN ASSISTANT

## 2020-10-19 PROCEDURE — 93005 ELECTROCARDIOGRAM TRACING: CPT

## 2020-10-19 RX ORDER — POTASSIUM CHLORIDE 14.9 MG/ML
20 INJECTION INTRAVENOUS ONCE
Status: COMPLETED | OUTPATIENT
Start: 2020-10-19 | End: 2020-10-19

## 2020-10-19 RX ORDER — LANOLIN ALCOHOL/MO/W.PET/CERES
3 CREAM (GRAM) TOPICAL ONCE
Status: COMPLETED | OUTPATIENT
Start: 2020-10-19 | End: 2020-10-19

## 2020-10-19 RX ADMIN — PIPERACILLIN AND TAZOBACTAM 3.38 G: 3; .375 INJECTION, POWDER, LYOPHILIZED, FOR SOLUTION INTRAVENOUS at 19:54

## 2020-10-19 RX ADMIN — PIPERACILLIN AND TAZOBACTAM 3.38 G: 3; .375 INJECTION, POWDER, LYOPHILIZED, FOR SOLUTION INTRAVENOUS at 11:58

## 2020-10-19 RX ADMIN — Medication: at 21:55

## 2020-10-19 RX ADMIN — POTASSIUM CHLORIDE 20 MEQ: 14.9 INJECTION, SOLUTION INTRAVENOUS at 18:39

## 2020-10-19 RX ADMIN — HEPARIN SODIUM 5000 UNITS: 5000 INJECTION INTRAVENOUS; SUBCUTANEOUS at 22:10

## 2020-10-19 RX ADMIN — PIPERACILLIN AND TAZOBACTAM 3.38 G: 3; .375 INJECTION, POWDER, LYOPHILIZED, FOR SOLUTION INTRAVENOUS at 05:30

## 2020-10-19 RX ADMIN — PANTOPRAZOLE SODIUM 40 MG: 40 INJECTION, POWDER, FOR SOLUTION INTRAVENOUS at 09:08

## 2020-10-19 RX ADMIN — PIPERACILLIN AND TAZOBACTAM 3.38 G: 3; .375 INJECTION, POWDER, LYOPHILIZED, FOR SOLUTION INTRAVENOUS at 00:37

## 2020-10-19 RX ADMIN — HEPARIN SODIUM 5000 UNITS: 5000 INJECTION INTRAVENOUS; SUBCUTANEOUS at 05:29

## 2020-10-19 RX ADMIN — HEPARIN SODIUM 5000 UNITS: 5000 INJECTION INTRAVENOUS; SUBCUTANEOUS at 18:36

## 2020-10-19 RX ADMIN — POTASSIUM CHLORIDE 20 MEQ: 14.9 INJECTION, SOLUTION INTRAVENOUS at 11:58

## 2020-10-19 RX ADMIN — MELATONIN TAB 3 MG 6 MG: 3 TAB at 22:10

## 2020-10-19 RX ADMIN — SODIUM CHLORIDE 50 ML/HR: 0.9 INJECTION, SOLUTION INTRAVENOUS at 22:02

## 2020-10-19 RX ADMIN — MELATONIN TAB 3 MG 3 MG: 3 TAB at 22:52

## 2020-10-20 PROBLEM — A41.9 SEPSIS (HCC): Status: RESOLVED | Noted: 2020-10-12 | Resolved: 2020-10-20

## 2020-10-20 LAB
ANION GAP SERPL CALCULATED.3IONS-SCNC: 7 MMOL/L (ref 4–13)
ATRIAL RATE: 105 BPM
BASOPHILS # BLD AUTO: 0.03 THOUSANDS/ΜL (ref 0–0.1)
BASOPHILS NFR BLD AUTO: 0 % (ref 0–1)
BUN SERPL-MCNC: 6 MG/DL (ref 5–25)
CALCIUM SERPL-MCNC: 7.2 MG/DL (ref 8.3–10.1)
CHLORIDE SERPL-SCNC: 104 MMOL/L (ref 100–108)
CO2 SERPL-SCNC: 26 MMOL/L (ref 21–32)
CREAT SERPL-MCNC: 0.69 MG/DL (ref 0.6–1.3)
EOSINOPHIL # BLD AUTO: 0.12 THOUSAND/ΜL (ref 0–0.61)
EOSINOPHIL NFR BLD AUTO: 1 % (ref 0–6)
ERYTHROCYTE [DISTWIDTH] IN BLOOD BY AUTOMATED COUNT: 13.4 % (ref 11.6–15.1)
GFR SERPL CREATININE-BSD FRML MDRD: 110 ML/MIN/1.73SQ M
GLUCOSE SERPL-MCNC: 119 MG/DL (ref 65–140)
HCT VFR BLD AUTO: 29.5 % (ref 36.5–49.3)
HGB BLD-MCNC: 10.1 G/DL (ref 12–17)
IMM GRANULOCYTES # BLD AUTO: 0.16 THOUSAND/UL (ref 0–0.2)
IMM GRANULOCYTES NFR BLD AUTO: 1 % (ref 0–2)
LYMPHOCYTES # BLD AUTO: 1.12 THOUSANDS/ΜL (ref 0.6–4.47)
LYMPHOCYTES NFR BLD AUTO: 6 % (ref 14–44)
MAGNESIUM SERPL-MCNC: 2.2 MG/DL (ref 1.6–2.6)
MCH RBC QN AUTO: 32 PG (ref 26.8–34.3)
MCHC RBC AUTO-ENTMCNC: 34.2 G/DL (ref 31.4–37.4)
MCV RBC AUTO: 93 FL (ref 82–98)
MONOCYTES # BLD AUTO: 1.53 THOUSAND/ΜL (ref 0.17–1.22)
MONOCYTES NFR BLD AUTO: 9 % (ref 4–12)
NEUTROPHILS # BLD AUTO: 14.49 THOUSANDS/ΜL (ref 1.85–7.62)
NEUTS SEG NFR BLD AUTO: 83 % (ref 43–75)
NRBC BLD AUTO-RTO: 0 /100 WBCS
P AXIS: 33 DEGREES
PHOSPHATE SERPL-MCNC: 2.9 MG/DL (ref 2.7–4.5)
PLATELET # BLD AUTO: 457 THOUSANDS/UL (ref 149–390)
PMV BLD AUTO: 9.4 FL (ref 8.9–12.7)
POTASSIUM SERPL-SCNC: 3.3 MMOL/L (ref 3.5–5.3)
PR INTERVAL: 114 MS
QRS AXIS: -41 DEGREES
QRSD INTERVAL: 102 MS
QT INTERVAL: 328 MS
QTC INTERVAL: 433 MS
RBC # BLD AUTO: 3.16 MILLION/UL (ref 3.88–5.62)
SODIUM SERPL-SCNC: 137 MMOL/L (ref 136–145)
T WAVE AXIS: 14 DEGREES
VENTRICULAR RATE: 105 BPM
WBC # BLD AUTO: 17.45 THOUSAND/UL (ref 4.31–10.16)

## 2020-10-20 PROCEDURE — 80048 BASIC METABOLIC PNL TOTAL CA: CPT | Performed by: PHYSICIAN ASSISTANT

## 2020-10-20 PROCEDURE — 85025 COMPLETE CBC W/AUTO DIFF WBC: CPT | Performed by: PHYSICIAN ASSISTANT

## 2020-10-20 PROCEDURE — 93010 ELECTROCARDIOGRAM REPORT: CPT | Performed by: INTERNAL MEDICINE

## 2020-10-20 PROCEDURE — 99232 SBSQ HOSP IP/OBS MODERATE 35: CPT | Performed by: INTERNAL MEDICINE

## 2020-10-20 PROCEDURE — 83735 ASSAY OF MAGNESIUM: CPT | Performed by: PHYSICIAN ASSISTANT

## 2020-10-20 PROCEDURE — 84100 ASSAY OF PHOSPHORUS: CPT | Performed by: PHYSICIAN ASSISTANT

## 2020-10-20 PROCEDURE — C9113 INJ PANTOPRAZOLE SODIUM, VIA: HCPCS | Performed by: PHYSICIAN ASSISTANT

## 2020-10-20 PROCEDURE — 99024 POSTOP FOLLOW-UP VISIT: CPT | Performed by: PHYSICIAN ASSISTANT

## 2020-10-20 RX ORDER — LOSARTAN POTASSIUM 50 MG/1
100 TABLET ORAL DAILY
Status: DISCONTINUED | OUTPATIENT
Start: 2020-10-20 | End: 2020-11-07 | Stop reason: HOSPADM

## 2020-10-20 RX ORDER — POTASSIUM CHLORIDE 14.9 MG/ML
20 INJECTION INTRAVENOUS ONCE
Status: COMPLETED | OUTPATIENT
Start: 2020-10-20 | End: 2020-10-20

## 2020-10-20 RX ORDER — LEVOTHYROXINE SODIUM 0.07 MG/1
75 TABLET ORAL
Status: DISCONTINUED | OUTPATIENT
Start: 2020-10-20 | End: 2020-11-07 | Stop reason: HOSPADM

## 2020-10-20 RX ORDER — POTASSIUM CHLORIDE 20 MEQ/1
20 TABLET, EXTENDED RELEASE ORAL 2 TIMES DAILY
Status: DISCONTINUED | OUTPATIENT
Start: 2020-10-20 | End: 2020-11-03

## 2020-10-20 RX ORDER — AMLODIPINE BESYLATE 5 MG/1
10 TABLET ORAL DAILY
Status: DISCONTINUED | OUTPATIENT
Start: 2020-10-20 | End: 2020-11-07 | Stop reason: HOSPADM

## 2020-10-20 RX ORDER — AMLODIPINE AND VALSARTAN 10; 160 MG/1; MG/1
1 TABLET ORAL DAILY
Status: DISCONTINUED | OUTPATIENT
Start: 2020-10-20 | End: 2020-10-20 | Stop reason: SDUPTHER

## 2020-10-20 RX ORDER — LANOLIN ALCOHOL/MO/W.PET/CERES
3 CREAM (GRAM) TOPICAL ONCE
Status: COMPLETED | OUTPATIENT
Start: 2020-10-20 | End: 2020-10-20

## 2020-10-20 RX ORDER — PRAVASTATIN SODIUM 20 MG
20 TABLET ORAL
Status: DISCONTINUED | OUTPATIENT
Start: 2020-10-20 | End: 2020-11-07 | Stop reason: HOSPADM

## 2020-10-20 RX ADMIN — MELATONIN TAB 3 MG 3 MG: 3 TAB at 23:46

## 2020-10-20 RX ADMIN — PIPERACILLIN AND TAZOBACTAM 3.38 G: 3; .375 INJECTION, POWDER, LYOPHILIZED, FOR SOLUTION INTRAVENOUS at 05:39

## 2020-10-20 RX ADMIN — PRAVASTATIN SODIUM 20 MG: 20 TABLET ORAL at 17:20

## 2020-10-20 RX ADMIN — PANTOPRAZOLE SODIUM 40 MG: 40 INJECTION, POWDER, FOR SOLUTION INTRAVENOUS at 08:54

## 2020-10-20 RX ADMIN — POTASSIUM CHLORIDE 20 MEQ: 14.9 INJECTION, SOLUTION INTRAVENOUS at 08:54

## 2020-10-20 RX ADMIN — PIPERACILLIN AND TAZOBACTAM 3.38 G: 3; .375 INJECTION, POWDER, LYOPHILIZED, FOR SOLUTION INTRAVENOUS at 13:22

## 2020-10-20 RX ADMIN — POTASSIUM CHLORIDE 20 MEQ: 1500 TABLET, EXTENDED RELEASE ORAL at 17:20

## 2020-10-20 RX ADMIN — HEPARIN SODIUM 5000 UNITS: 5000 INJECTION INTRAVENOUS; SUBCUTANEOUS at 05:22

## 2020-10-20 RX ADMIN — AMLODIPINE BESYLATE 10 MG: 5 TABLET ORAL at 12:12

## 2020-10-20 RX ADMIN — HEPARIN SODIUM 5000 UNITS: 5000 INJECTION INTRAVENOUS; SUBCUTANEOUS at 13:22

## 2020-10-20 RX ADMIN — Medication: at 21:32

## 2020-10-20 RX ADMIN — HEPARIN SODIUM 5000 UNITS: 5000 INJECTION INTRAVENOUS; SUBCUTANEOUS at 21:31

## 2020-10-20 RX ADMIN — PIPERACILLIN AND TAZOBACTAM 3.38 G: 3; .375 INJECTION, POWDER, LYOPHILIZED, FOR SOLUTION INTRAVENOUS at 01:20

## 2020-10-20 RX ADMIN — MELATONIN TAB 3 MG 6 MG: 3 TAB at 21:31

## 2020-10-20 RX ADMIN — PIPERACILLIN AND TAZOBACTAM 3.38 G: 3; .375 INJECTION, POWDER, LYOPHILIZED, FOR SOLUTION INTRAVENOUS at 18:17

## 2020-10-20 RX ADMIN — LEVOTHYROXINE SODIUM 75 MCG: 75 TABLET ORAL at 10:15

## 2020-10-20 RX ADMIN — LOSARTAN POTASSIUM 100 MG: 50 TABLET, FILM COATED ORAL at 12:12

## 2020-10-20 RX ADMIN — POTASSIUM CHLORIDE 20 MEQ: 1500 TABLET, EXTENDED RELEASE ORAL at 10:16

## 2020-10-21 PROBLEM — E87.6 HYPOKALEMIA: Status: RESOLVED | Noted: 2020-10-14 | Resolved: 2020-10-21

## 2020-10-21 LAB
ANION GAP SERPL CALCULATED.3IONS-SCNC: 7 MMOL/L (ref 4–13)
BASOPHILS # BLD AUTO: 0.07 THOUSANDS/ΜL (ref 0–0.1)
BASOPHILS NFR BLD AUTO: 0 % (ref 0–1)
BUN SERPL-MCNC: 7 MG/DL (ref 5–25)
CALCIUM SERPL-MCNC: 7.8 MG/DL (ref 8.3–10.1)
CHLORIDE SERPL-SCNC: 103 MMOL/L (ref 100–108)
CO2 SERPL-SCNC: 26 MMOL/L (ref 21–32)
CREAT SERPL-MCNC: 0.69 MG/DL (ref 0.6–1.3)
EOSINOPHIL # BLD AUTO: 0.19 THOUSAND/ΜL (ref 0–0.61)
EOSINOPHIL NFR BLD AUTO: 1 % (ref 0–6)
ERYTHROCYTE [DISTWIDTH] IN BLOOD BY AUTOMATED COUNT: 13.7 % (ref 11.6–15.1)
GFR SERPL CREATININE-BSD FRML MDRD: 110 ML/MIN/1.73SQ M
GLUCOSE SERPL-MCNC: 124 MG/DL (ref 65–140)
HCT VFR BLD AUTO: 31.3 % (ref 36.5–49.3)
HGB BLD-MCNC: 10.4 G/DL (ref 12–17)
IMM GRANULOCYTES # BLD AUTO: 0.12 THOUSAND/UL (ref 0–0.2)
IMM GRANULOCYTES NFR BLD AUTO: 1 % (ref 0–2)
LYMPHOCYTES # BLD AUTO: 1.35 THOUSANDS/ΜL (ref 0.6–4.47)
LYMPHOCYTES NFR BLD AUTO: 9 % (ref 14–44)
MCH RBC QN AUTO: 31.5 PG (ref 26.8–34.3)
MCHC RBC AUTO-ENTMCNC: 33.2 G/DL (ref 31.4–37.4)
MCV RBC AUTO: 95 FL (ref 82–98)
MONOCYTES # BLD AUTO: 1.96 THOUSAND/ΜL (ref 0.17–1.22)
MONOCYTES NFR BLD AUTO: 13 % (ref 4–12)
NEUTROPHILS # BLD AUTO: 12.04 THOUSANDS/ΜL (ref 1.85–7.62)
NEUTS SEG NFR BLD AUTO: 76 % (ref 43–75)
NRBC BLD AUTO-RTO: 0 /100 WBCS
PLATELET # BLD AUTO: 522 THOUSANDS/UL (ref 149–390)
PMV BLD AUTO: 9.7 FL (ref 8.9–12.7)
POTASSIUM SERPL-SCNC: 3.9 MMOL/L (ref 3.5–5.3)
RBC # BLD AUTO: 3.3 MILLION/UL (ref 3.88–5.62)
SODIUM SERPL-SCNC: 136 MMOL/L (ref 136–145)
WBC # BLD AUTO: 15.73 THOUSAND/UL (ref 4.31–10.16)

## 2020-10-21 PROCEDURE — 99024 POSTOP FOLLOW-UP VISIT: CPT | Performed by: PHYSICIAN ASSISTANT

## 2020-10-21 PROCEDURE — 85025 COMPLETE CBC W/AUTO DIFF WBC: CPT | Performed by: PHYSICIAN ASSISTANT

## 2020-10-21 PROCEDURE — 80048 BASIC METABOLIC PNL TOTAL CA: CPT | Performed by: PHYSICIAN ASSISTANT

## 2020-10-21 PROCEDURE — C9113 INJ PANTOPRAZOLE SODIUM, VIA: HCPCS | Performed by: PHYSICIAN ASSISTANT

## 2020-10-21 PROCEDURE — 99232 SBSQ HOSP IP/OBS MODERATE 35: CPT | Performed by: INTERNAL MEDICINE

## 2020-10-21 PROCEDURE — 87493 C DIFF AMPLIFIED PROBE: CPT | Performed by: PHYSICIAN ASSISTANT

## 2020-10-21 RX ORDER — HYDROCODONE BITARTRATE AND ACETAMINOPHEN 5; 325 MG/1; MG/1
1 TABLET ORAL EVERY 4 HOURS PRN
Status: DISCONTINUED | OUTPATIENT
Start: 2020-10-21 | End: 2020-10-26

## 2020-10-21 RX ORDER — HYDROCODONE BITARTRATE AND ACETAMINOPHEN 5; 325 MG/1; MG/1
2 TABLET ORAL EVERY 4 HOURS PRN
Status: DISCONTINUED | OUTPATIENT
Start: 2020-10-21 | End: 2020-10-26

## 2020-10-21 RX ORDER — LANOLIN ALCOHOL/MO/W.PET/CERES
3 CREAM (GRAM) TOPICAL ONCE
Status: COMPLETED | OUTPATIENT
Start: 2020-10-21 | End: 2020-10-22

## 2020-10-21 RX ADMIN — PIPERACILLIN AND TAZOBACTAM 3.38 G: 3; .375 INJECTION, POWDER, LYOPHILIZED, FOR SOLUTION INTRAVENOUS at 12:58

## 2020-10-21 RX ADMIN — PRAVASTATIN SODIUM 20 MG: 20 TABLET ORAL at 17:45

## 2020-10-21 RX ADMIN — AMLODIPINE BESYLATE 10 MG: 5 TABLET ORAL at 09:38

## 2020-10-21 RX ADMIN — LEVOTHYROXINE SODIUM 75 MCG: 75 TABLET ORAL at 05:12

## 2020-10-21 RX ADMIN — PIPERACILLIN AND TAZOBACTAM 3.38 G: 3; .375 INJECTION, POWDER, LYOPHILIZED, FOR SOLUTION INTRAVENOUS at 06:42

## 2020-10-21 RX ADMIN — HEPARIN SODIUM 5000 UNITS: 5000 INJECTION INTRAVENOUS; SUBCUTANEOUS at 14:33

## 2020-10-21 RX ADMIN — POTASSIUM CHLORIDE 20 MEQ: 1500 TABLET, EXTENDED RELEASE ORAL at 09:37

## 2020-10-21 RX ADMIN — PANTOPRAZOLE SODIUM 40 MG: 40 INJECTION, POWDER, FOR SOLUTION INTRAVENOUS at 09:38

## 2020-10-21 RX ADMIN — PIPERACILLIN AND TAZOBACTAM 3.38 G: 3; .375 INJECTION, POWDER, LYOPHILIZED, FOR SOLUTION INTRAVENOUS at 17:46

## 2020-10-21 RX ADMIN — HEPARIN SODIUM 5000 UNITS: 5000 INJECTION INTRAVENOUS; SUBCUTANEOUS at 05:12

## 2020-10-21 RX ADMIN — POTASSIUM CHLORIDE 20 MEQ: 1500 TABLET, EXTENDED RELEASE ORAL at 17:45

## 2020-10-21 RX ADMIN — MELATONIN TAB 3 MG 6 MG: 3 TAB at 22:06

## 2020-10-21 RX ADMIN — PIPERACILLIN AND TAZOBACTAM 3.38 G: 3; .375 INJECTION, POWDER, LYOPHILIZED, FOR SOLUTION INTRAVENOUS at 00:52

## 2020-10-21 RX ADMIN — HEPARIN SODIUM 5000 UNITS: 5000 INJECTION INTRAVENOUS; SUBCUTANEOUS at 22:06

## 2020-10-21 RX ADMIN — LOSARTAN POTASSIUM 100 MG: 50 TABLET, FILM COATED ORAL at 09:37

## 2020-10-22 ENCOUNTER — APPOINTMENT (INPATIENT)
Dept: CT IMAGING | Facility: HOSPITAL | Age: 50
DRG: 853 | End: 2020-10-22
Payer: COMMERCIAL

## 2020-10-22 LAB
ANION GAP SERPL CALCULATED.3IONS-SCNC: 6 MMOL/L (ref 4–13)
BASOPHILS # BLD MANUAL: 0 THOUSAND/UL (ref 0–0.1)
BASOPHILS NFR MAR MANUAL: 0 % (ref 0–1)
BUN SERPL-MCNC: 8 MG/DL (ref 5–25)
C DIFF TOX B TCDB STL QL NAA+PROBE: NEGATIVE
CALCIUM SERPL-MCNC: 8 MG/DL (ref 8.3–10.1)
CHLORIDE SERPL-SCNC: 100 MMOL/L (ref 100–108)
CO2 SERPL-SCNC: 25 MMOL/L (ref 21–32)
CREAT SERPL-MCNC: 0.75 MG/DL (ref 0.6–1.3)
EOSINOPHIL # BLD MANUAL: 0.49 THOUSAND/UL (ref 0–0.4)
EOSINOPHIL NFR BLD MANUAL: 3 % (ref 0–6)
ERYTHROCYTE [DISTWIDTH] IN BLOOD BY AUTOMATED COUNT: 13.8 % (ref 11.6–15.1)
GFR SERPL CREATININE-BSD FRML MDRD: 106 ML/MIN/1.73SQ M
GLUCOSE SERPL-MCNC: 110 MG/DL (ref 65–140)
HCT VFR BLD AUTO: 33.2 % (ref 36.5–49.3)
HGB BLD-MCNC: 10.9 G/DL (ref 12–17)
LG PLATELETS BLD QL SMEAR: PRESENT
LYMPHOCYTES # BLD AUTO: 1.47 THOUSAND/UL (ref 0.6–4.47)
LYMPHOCYTES # BLD AUTO: 9 % (ref 14–44)
MCH RBC QN AUTO: 31.3 PG (ref 26.8–34.3)
MCHC RBC AUTO-ENTMCNC: 32.8 G/DL (ref 31.4–37.4)
MCV RBC AUTO: 95 FL (ref 82–98)
MONOCYTES # BLD AUTO: 2.29 THOUSAND/UL (ref 0–1.22)
MONOCYTES NFR BLD: 14 % (ref 4–12)
NEUTROPHILS # BLD MANUAL: 11.79 THOUSAND/UL (ref 1.85–7.62)
NEUTS BAND NFR BLD MANUAL: 3 % (ref 0–8)
NEUTS SEG NFR BLD AUTO: 69 % (ref 43–75)
NRBC BLD AUTO-RTO: 0 /100 WBCS
PLATELET # BLD AUTO: 540 THOUSANDS/UL (ref 149–390)
PLATELET BLD QL SMEAR: ABNORMAL
PMV BLD AUTO: 9.6 FL (ref 8.9–12.7)
POTASSIUM SERPL-SCNC: 4.9 MMOL/L (ref 3.5–5.3)
RBC # BLD AUTO: 3.48 MILLION/UL (ref 3.88–5.62)
SODIUM SERPL-SCNC: 131 MMOL/L (ref 136–145)
TOTAL CELLS COUNTED SPEC: 100
VARIANT LYMPHS # BLD AUTO: 2 %
WBC # BLD AUTO: 16.37 THOUSAND/UL (ref 4.31–10.16)

## 2020-10-22 PROCEDURE — 85027 COMPLETE CBC AUTOMATED: CPT | Performed by: PHYSICIAN ASSISTANT

## 2020-10-22 PROCEDURE — C9113 INJ PANTOPRAZOLE SODIUM, VIA: HCPCS | Performed by: PHYSICIAN ASSISTANT

## 2020-10-22 PROCEDURE — 80048 BASIC METABOLIC PNL TOTAL CA: CPT | Performed by: PHYSICIAN ASSISTANT

## 2020-10-22 PROCEDURE — G1004 CDSM NDSC: HCPCS

## 2020-10-22 PROCEDURE — 99232 SBSQ HOSP IP/OBS MODERATE 35: CPT | Performed by: INTERNAL MEDICINE

## 2020-10-22 PROCEDURE — 85007 BL SMEAR W/DIFF WBC COUNT: CPT | Performed by: PHYSICIAN ASSISTANT

## 2020-10-22 PROCEDURE — 99024 POSTOP FOLLOW-UP VISIT: CPT | Performed by: PHYSICIAN ASSISTANT

## 2020-10-22 PROCEDURE — 74177 CT ABD & PELVIS W/CONTRAST: CPT

## 2020-10-22 RX ORDER — SODIUM CHLORIDE 9 MG/ML
50 INJECTION, SOLUTION INTRAVENOUS CONTINUOUS
Status: DISCONTINUED | OUTPATIENT
Start: 2020-10-22 | End: 2020-11-02

## 2020-10-22 RX ADMIN — IOHEXOL 100 ML: 350 INJECTION, SOLUTION INTRAVENOUS at 14:22

## 2020-10-22 RX ADMIN — PRAVASTATIN SODIUM 20 MG: 20 TABLET ORAL at 17:27

## 2020-10-22 RX ADMIN — PANTOPRAZOLE SODIUM 40 MG: 40 INJECTION, POWDER, FOR SOLUTION INTRAVENOUS at 11:23

## 2020-10-22 RX ADMIN — LEVOTHYROXINE SODIUM 75 MCG: 75 TABLET ORAL at 05:54

## 2020-10-22 RX ADMIN — SODIUM CHLORIDE 50 ML/HR: 0.9 INJECTION, SOLUTION INTRAVENOUS at 12:40

## 2020-10-22 RX ADMIN — LOSARTAN POTASSIUM 100 MG: 50 TABLET, FILM COATED ORAL at 11:23

## 2020-10-22 RX ADMIN — MELATONIN TAB 3 MG 6 MG: 3 TAB at 21:47

## 2020-10-22 RX ADMIN — HEPARIN SODIUM 5000 UNITS: 5000 INJECTION INTRAVENOUS; SUBCUTANEOUS at 05:55

## 2020-10-22 RX ADMIN — MELATONIN 3 MG: at 00:19

## 2020-10-22 RX ADMIN — PIPERACILLIN AND TAZOBACTAM 3.38 G: 3; .375 INJECTION, POWDER, LYOPHILIZED, FOR SOLUTION INTRAVENOUS at 00:19

## 2020-10-22 RX ADMIN — PIPERACILLIN AND TAZOBACTAM 3.38 G: 3; .375 INJECTION, POWDER, LYOPHILIZED, FOR SOLUTION INTRAVENOUS at 18:53

## 2020-10-22 RX ADMIN — PIPERACILLIN AND TAZOBACTAM 3.38 G: 3; .375 INJECTION, POWDER, LYOPHILIZED, FOR SOLUTION INTRAVENOUS at 05:55

## 2020-10-22 RX ADMIN — AMLODIPINE BESYLATE 10 MG: 5 TABLET ORAL at 11:23

## 2020-10-22 RX ADMIN — HEPARIN SODIUM 5000 UNITS: 5000 INJECTION INTRAVENOUS; SUBCUTANEOUS at 21:47

## 2020-10-22 RX ADMIN — POTASSIUM CHLORIDE 20 MEQ: 1500 TABLET, EXTENDED RELEASE ORAL at 17:27

## 2020-10-22 RX ADMIN — POTASSIUM CHLORIDE 20 MEQ: 1500 TABLET, EXTENDED RELEASE ORAL at 11:22

## 2020-10-22 RX ADMIN — IOHEXOL 50 ML: 240 INJECTION, SOLUTION INTRATHECAL; INTRAVASCULAR; INTRAVENOUS; ORAL at 14:22

## 2020-10-22 RX ADMIN — HEPARIN SODIUM 5000 UNITS: 5000 INJECTION INTRAVENOUS; SUBCUTANEOUS at 13:00

## 2020-10-22 RX ADMIN — PIPERACILLIN AND TAZOBACTAM 3.38 G: 3; .375 INJECTION, POWDER, LYOPHILIZED, FOR SOLUTION INTRAVENOUS at 13:00

## 2020-10-23 ENCOUNTER — APPOINTMENT (INPATIENT)
Dept: CT IMAGING | Facility: HOSPITAL | Age: 50
DRG: 853 | End: 2020-10-23
Attending: RADIOLOGY
Payer: COMMERCIAL

## 2020-10-23 LAB
ANION GAP SERPL CALCULATED.3IONS-SCNC: 7 MMOL/L (ref 4–13)
BACTERIA SPEC BFLD CULT: ABNORMAL
BACTERIA SPEC BFLD CULT: ABNORMAL
BASOPHILS # BLD AUTO: 0.08 THOUSANDS/ΜL (ref 0–0.1)
BASOPHILS NFR BLD AUTO: 1 % (ref 0–1)
BUN SERPL-MCNC: 7 MG/DL (ref 5–25)
CALCIUM SERPL-MCNC: 6.9 MG/DL (ref 8.3–10.1)
CHLORIDE SERPL-SCNC: 105 MMOL/L (ref 100–108)
CO2 SERPL-SCNC: 23 MMOL/L (ref 21–32)
CREAT SERPL-MCNC: 0.66 MG/DL (ref 0.6–1.3)
EOSINOPHIL # BLD AUTO: 0.13 THOUSAND/ΜL (ref 0–0.61)
EOSINOPHIL NFR BLD AUTO: 1 % (ref 0–6)
ERYTHROCYTE [DISTWIDTH] IN BLOOD BY AUTOMATED COUNT: 13.7 % (ref 11.6–15.1)
GFR SERPL CREATININE-BSD FRML MDRD: 112 ML/MIN/1.73SQ M
GLUCOSE SERPL-MCNC: 98 MG/DL (ref 65–140)
GRAM STN SPEC: ABNORMAL
GRAM STN SPEC: ABNORMAL
HCT VFR BLD AUTO: 30 % (ref 36.5–49.3)
HGB BLD-MCNC: 10 G/DL (ref 12–17)
IMM GRANULOCYTES # BLD AUTO: 0.18 THOUSAND/UL (ref 0–0.2)
IMM GRANULOCYTES NFR BLD AUTO: 1 % (ref 0–2)
LYMPHOCYTES # BLD AUTO: 1.47 THOUSANDS/ΜL (ref 0.6–4.47)
LYMPHOCYTES NFR BLD AUTO: 10 % (ref 14–44)
MCH RBC QN AUTO: 31.4 PG (ref 26.8–34.3)
MCHC RBC AUTO-ENTMCNC: 33.3 G/DL (ref 31.4–37.4)
MCV RBC AUTO: 94 FL (ref 82–98)
MONOCYTES # BLD AUTO: 2.71 THOUSAND/ΜL (ref 0.17–1.22)
MONOCYTES NFR BLD AUTO: 18 % (ref 4–12)
NEUTROPHILS # BLD AUTO: 10.48 THOUSANDS/ΜL (ref 1.85–7.62)
NEUTS SEG NFR BLD AUTO: 69 % (ref 43–75)
NRBC BLD AUTO-RTO: 0 /100 WBCS
PLATELET # BLD AUTO: 573 THOUSANDS/UL (ref 149–390)
PMV BLD AUTO: 9.4 FL (ref 8.9–12.7)
POTASSIUM SERPL-SCNC: 3.5 MMOL/L (ref 3.5–5.3)
PREALB SERPL-MCNC: 8.3 MG/DL (ref 18–40)
RBC # BLD AUTO: 3.18 MILLION/UL (ref 3.88–5.62)
SODIUM SERPL-SCNC: 135 MMOL/L (ref 136–145)
WBC # BLD AUTO: 15.05 THOUSAND/UL (ref 4.31–10.16)

## 2020-10-23 PROCEDURE — 80048 BASIC METABOLIC PNL TOTAL CA: CPT | Performed by: PHYSICIAN ASSISTANT

## 2020-10-23 PROCEDURE — 87076 CULTURE ANAEROBE IDENT EACH: CPT | Performed by: SURGERY

## 2020-10-23 PROCEDURE — 87205 SMEAR GRAM STAIN: CPT | Performed by: SURGERY

## 2020-10-23 PROCEDURE — 99024 POSTOP FOLLOW-UP VISIT: CPT | Performed by: PHYSICIAN ASSISTANT

## 2020-10-23 PROCEDURE — 85027 COMPLETE CBC AUTOMATED: CPT | Performed by: PHYSICIAN ASSISTANT

## 2020-10-23 PROCEDURE — 0W9J30Z DRAINAGE OF PELVIC CAVITY WITH DRAINAGE DEVICE, PERCUTANEOUS APPROACH: ICD-10-PCS | Performed by: RADIOLOGY

## 2020-10-23 PROCEDURE — 99152 MOD SED SAME PHYS/QHP 5/>YRS: CPT | Performed by: RADIOLOGY

## 2020-10-23 PROCEDURE — C1729 CATH, DRAINAGE: HCPCS

## 2020-10-23 PROCEDURE — 10030 IMG GID FLU COLL DRG SFT TIS: CPT

## 2020-10-23 PROCEDURE — 99232 SBSQ HOSP IP/OBS MODERATE 35: CPT | Performed by: INTERNAL MEDICINE

## 2020-10-23 PROCEDURE — 84134 ASSAY OF PREALBUMIN: CPT | Performed by: PHYSICIAN ASSISTANT

## 2020-10-23 PROCEDURE — 87147 CULTURE TYPE IMMUNOLOGIC: CPT | Performed by: SURGERY

## 2020-10-23 PROCEDURE — 87186 SC STD MICRODIL/AGAR DIL: CPT | Performed by: SURGERY

## 2020-10-23 PROCEDURE — 87070 CULTURE OTHR SPECIMN AEROBIC: CPT | Performed by: SURGERY

## 2020-10-23 PROCEDURE — 99152 MOD SED SAME PHYS/QHP 5/>YRS: CPT

## 2020-10-23 PROCEDURE — 87077 CULTURE AEROBIC IDENTIFY: CPT | Performed by: SURGERY

## 2020-10-23 PROCEDURE — C9113 INJ PANTOPRAZOLE SODIUM, VIA: HCPCS | Performed by: PHYSICIAN ASSISTANT

## 2020-10-23 PROCEDURE — 87075 CULTR BACTERIA EXCEPT BLOOD: CPT | Performed by: SURGERY

## 2020-10-23 PROCEDURE — 49406 IMAGE CATH FLUID PERI/RETRO: CPT | Performed by: RADIOLOGY

## 2020-10-23 PROCEDURE — C1769 GUIDE WIRE: HCPCS

## 2020-10-23 PROCEDURE — NC001 PR NO CHARGE: Performed by: RADIOLOGY

## 2020-10-23 PROCEDURE — 87185 SC STD ENZYME DETCJ PER NZM: CPT | Performed by: SURGERY

## 2020-10-23 PROCEDURE — 0W9G30Z DRAINAGE OF PERITONEAL CAVITY WITH DRAINAGE DEVICE, PERCUTANEOUS APPROACH: ICD-10-PCS | Performed by: RADIOLOGY

## 2020-10-23 PROCEDURE — 99153 MOD SED SAME PHYS/QHP EA: CPT

## 2020-10-23 RX ORDER — DIPHENHYDRAMINE HYDROCHLORIDE 50 MG/ML
INJECTION INTRAMUSCULAR; INTRAVENOUS CODE/TRAUMA/SEDATION MEDICATION
Status: COMPLETED | OUTPATIENT
Start: 2020-10-23 | End: 2020-10-23

## 2020-10-23 RX ORDER — KETOROLAC TROMETHAMINE 30 MG/ML
15 INJECTION, SOLUTION INTRAMUSCULAR; INTRAVENOUS ONCE
Status: COMPLETED | OUTPATIENT
Start: 2020-10-23 | End: 2020-10-23

## 2020-10-23 RX ORDER — FENTANYL CITRATE 50 UG/ML
INJECTION, SOLUTION INTRAMUSCULAR; INTRAVENOUS CODE/TRAUMA/SEDATION MEDICATION
Status: COMPLETED | OUTPATIENT
Start: 2020-10-23 | End: 2020-10-23

## 2020-10-23 RX ORDER — MIDAZOLAM HYDROCHLORIDE 2 MG/2ML
INJECTION, SOLUTION INTRAMUSCULAR; INTRAVENOUS CODE/TRAUMA/SEDATION MEDICATION
Status: COMPLETED | OUTPATIENT
Start: 2020-10-23 | End: 2020-10-23

## 2020-10-23 RX ADMIN — MIDAZOLAM 1 MG: 1 INJECTION INTRAMUSCULAR; INTRAVENOUS at 12:16

## 2020-10-23 RX ADMIN — HEPARIN SODIUM 5000 UNITS: 5000 INJECTION INTRAVENOUS; SUBCUTANEOUS at 22:10

## 2020-10-23 RX ADMIN — MIDAZOLAM 0.5 MG: 1 INJECTION INTRAMUSCULAR; INTRAVENOUS at 12:52

## 2020-10-23 RX ADMIN — HEPARIN SODIUM 5000 UNITS: 5000 INJECTION INTRAVENOUS; SUBCUTANEOUS at 06:18

## 2020-10-23 RX ADMIN — FENTANYL CITRATE 50 MCG: 50 INJECTION, SOLUTION INTRAMUSCULAR; INTRAVENOUS at 11:58

## 2020-10-23 RX ADMIN — MIDAZOLAM 0.5 MG: 1 INJECTION INTRAMUSCULAR; INTRAVENOUS at 12:03

## 2020-10-23 RX ADMIN — FENTANYL CITRATE 25 MCG: 50 INJECTION, SOLUTION INTRAMUSCULAR; INTRAVENOUS at 12:53

## 2020-10-23 RX ADMIN — MIDAZOLAM 0.5 MG: 1 INJECTION INTRAMUSCULAR; INTRAVENOUS at 12:08

## 2020-10-23 RX ADMIN — MIDAZOLAM 1 MG: 1 INJECTION INTRAMUSCULAR; INTRAVENOUS at 11:58

## 2020-10-23 RX ADMIN — POTASSIUM CHLORIDE 20 MEQ: 1500 TABLET, EXTENDED RELEASE ORAL at 18:18

## 2020-10-23 RX ADMIN — FENTANYL CITRATE 25 MCG: 50 INJECTION, SOLUTION INTRAMUSCULAR; INTRAVENOUS at 13:13

## 2020-10-23 RX ADMIN — POTASSIUM CHLORIDE 20 MEQ: 1500 TABLET, EXTENDED RELEASE ORAL at 10:06

## 2020-10-23 RX ADMIN — FENTANYL CITRATE 25 MCG: 50 INJECTION, SOLUTION INTRAMUSCULAR; INTRAVENOUS at 12:22

## 2020-10-23 RX ADMIN — MIDAZOLAM 0.5 MG: 1 INJECTION INTRAMUSCULAR; INTRAVENOUS at 12:22

## 2020-10-23 RX ADMIN — SODIUM CHLORIDE 50 ML/HR: 0.9 INJECTION, SOLUTION INTRAVENOUS at 00:26

## 2020-10-23 RX ADMIN — PANTOPRAZOLE SODIUM 40 MG: 40 INJECTION, POWDER, FOR SOLUTION INTRAVENOUS at 10:06

## 2020-10-23 RX ADMIN — PIPERACILLIN AND TAZOBACTAM 3.38 G: 3; .375 INJECTION, POWDER, LYOPHILIZED, FOR SOLUTION INTRAVENOUS at 00:26

## 2020-10-23 RX ADMIN — FENTANYL CITRATE 25 MCG: 50 INJECTION, SOLUTION INTRAMUSCULAR; INTRAVENOUS at 12:19

## 2020-10-23 RX ADMIN — PIPERACILLIN AND TAZOBACTAM 3.38 G: 3; .375 INJECTION, POWDER, LYOPHILIZED, FOR SOLUTION INTRAVENOUS at 06:20

## 2020-10-23 RX ADMIN — FENTANYL CITRATE 25 MCG: 50 INJECTION, SOLUTION INTRAMUSCULAR; INTRAVENOUS at 12:03

## 2020-10-23 RX ADMIN — KETOROLAC TROMETHAMINE 15 MG: 30 INJECTION, SOLUTION INTRAMUSCULAR; INTRAVENOUS at 19:11

## 2020-10-23 RX ADMIN — PIPERACILLIN AND TAZOBACTAM 3.38 G: 3; .375 INJECTION, POWDER, LYOPHILIZED, FOR SOLUTION INTRAVENOUS at 14:44

## 2020-10-23 RX ADMIN — AMLODIPINE BESYLATE 10 MG: 5 TABLET ORAL at 10:06

## 2020-10-23 RX ADMIN — PRAVASTATIN SODIUM 20 MG: 20 TABLET ORAL at 16:46

## 2020-10-23 RX ADMIN — FENTANYL CITRATE 50 MCG: 50 INJECTION, SOLUTION INTRAMUSCULAR; INTRAVENOUS at 13:25

## 2020-10-23 RX ADMIN — MELATONIN TAB 3 MG 6 MG: 3 TAB at 22:10

## 2020-10-23 RX ADMIN — FENTANYL CITRATE 25 MCG: 50 INJECTION, SOLUTION INTRAMUSCULAR; INTRAVENOUS at 12:42

## 2020-10-23 RX ADMIN — PIPERACILLIN AND TAZOBACTAM 3.38 G: 3; .375 INJECTION, POWDER, LYOPHILIZED, FOR SOLUTION INTRAVENOUS at 18:18

## 2020-10-23 RX ADMIN — LOSARTAN POTASSIUM 100 MG: 50 TABLET, FILM COATED ORAL at 10:06

## 2020-10-23 RX ADMIN — HEPARIN SODIUM 5000 UNITS: 5000 INJECTION INTRAVENOUS; SUBCUTANEOUS at 14:44

## 2020-10-23 RX ADMIN — DIPHENHYDRAMINE HYDROCHLORIDE 50 MG: 50 INJECTION, SOLUTION INTRAMUSCULAR; INTRAVENOUS at 12:38

## 2020-10-23 RX ADMIN — FENTANYL CITRATE 25 MCG: 50 INJECTION, SOLUTION INTRAMUSCULAR; INTRAVENOUS at 12:08

## 2020-10-24 LAB
ANION GAP SERPL CALCULATED.3IONS-SCNC: 8 MMOL/L (ref 4–13)
BASOPHILS # BLD AUTO: 0.07 THOUSANDS/ΜL (ref 0–0.1)
BASOPHILS NFR BLD AUTO: 0 % (ref 0–1)
BUN SERPL-MCNC: 8 MG/DL (ref 5–25)
CALCIUM SERPL-MCNC: 7.5 MG/DL (ref 8.3–10.1)
CHLORIDE SERPL-SCNC: 105 MMOL/L (ref 100–108)
CO2 SERPL-SCNC: 23 MMOL/L (ref 21–32)
CREAT SERPL-MCNC: 0.9 MG/DL (ref 0.6–1.3)
EOSINOPHIL # BLD AUTO: 0.1 THOUSAND/ΜL (ref 0–0.61)
EOSINOPHIL NFR BLD AUTO: 1 % (ref 0–6)
ERYTHROCYTE [DISTWIDTH] IN BLOOD BY AUTOMATED COUNT: 13.7 % (ref 11.6–15.1)
GFR SERPL CREATININE-BSD FRML MDRD: 99 ML/MIN/1.73SQ M
GLUCOSE SERPL-MCNC: 94 MG/DL (ref 65–140)
HCT VFR BLD AUTO: 29.8 % (ref 36.5–49.3)
HGB BLD-MCNC: 10 G/DL (ref 12–17)
IMM GRANULOCYTES # BLD AUTO: 0.22 THOUSAND/UL (ref 0–0.2)
IMM GRANULOCYTES NFR BLD AUTO: 1 % (ref 0–2)
LYMPHOCYTES # BLD AUTO: 1.68 THOUSANDS/ΜL (ref 0.6–4.47)
LYMPHOCYTES NFR BLD AUTO: 11 % (ref 14–44)
MAGNESIUM SERPL-MCNC: 1.6 MG/DL (ref 1.6–2.6)
MCH RBC QN AUTO: 31.5 PG (ref 26.8–34.3)
MCHC RBC AUTO-ENTMCNC: 33.6 G/DL (ref 31.4–37.4)
MCV RBC AUTO: 94 FL (ref 82–98)
MONOCYTES # BLD AUTO: 2.02 THOUSAND/ΜL (ref 0.17–1.22)
MONOCYTES NFR BLD AUTO: 13 % (ref 4–12)
NEUTROPHILS # BLD AUTO: 11.92 THOUSANDS/ΜL (ref 1.85–7.62)
NEUTS SEG NFR BLD AUTO: 74 % (ref 43–75)
NRBC BLD AUTO-RTO: 0 /100 WBCS
PHOSPHATE SERPL-MCNC: 3.5 MG/DL (ref 2.7–4.5)
PLATELET # BLD AUTO: 522 THOUSANDS/UL (ref 149–390)
PMV BLD AUTO: 8.7 FL (ref 8.9–12.7)
POTASSIUM SERPL-SCNC: 4 MMOL/L (ref 3.5–5.3)
RBC # BLD AUTO: 3.17 MILLION/UL (ref 3.88–5.62)
SODIUM SERPL-SCNC: 136 MMOL/L (ref 136–145)
WBC # BLD AUTO: 16.01 THOUSAND/UL (ref 4.31–10.16)

## 2020-10-24 PROCEDURE — 99232 SBSQ HOSP IP/OBS MODERATE 35: CPT | Performed by: INTERNAL MEDICINE

## 2020-10-24 PROCEDURE — 80048 BASIC METABOLIC PNL TOTAL CA: CPT | Performed by: PHYSICIAN ASSISTANT

## 2020-10-24 PROCEDURE — 99233 SBSQ HOSP IP/OBS HIGH 50: CPT | Performed by: SURGERY

## 2020-10-24 PROCEDURE — 85025 COMPLETE CBC W/AUTO DIFF WBC: CPT | Performed by: PHYSICIAN ASSISTANT

## 2020-10-24 PROCEDURE — 83735 ASSAY OF MAGNESIUM: CPT | Performed by: PHYSICIAN ASSISTANT

## 2020-10-24 PROCEDURE — C9113 INJ PANTOPRAZOLE SODIUM, VIA: HCPCS | Performed by: PHYSICIAN ASSISTANT

## 2020-10-24 PROCEDURE — 84100 ASSAY OF PHOSPHORUS: CPT | Performed by: PHYSICIAN ASSISTANT

## 2020-10-24 RX ADMIN — PIPERACILLIN AND TAZOBACTAM 3.38 G: 3; .375 INJECTION, POWDER, LYOPHILIZED, FOR SOLUTION INTRAVENOUS at 05:58

## 2020-10-24 RX ADMIN — HYDROCODONE BITARTRATE AND ACETAMINOPHEN 2 TABLET: 5; 325 TABLET ORAL at 08:51

## 2020-10-24 RX ADMIN — HYDROCODONE BITARTRATE AND ACETAMINOPHEN 2 TABLET: 5; 325 TABLET ORAL at 03:15

## 2020-10-24 RX ADMIN — MELATONIN TAB 3 MG 6 MG: 3 TAB at 22:24

## 2020-10-24 RX ADMIN — HEPARIN SODIUM 5000 UNITS: 5000 INJECTION INTRAVENOUS; SUBCUTANEOUS at 05:59

## 2020-10-24 RX ADMIN — ONDANSETRON 4 MG: 2 INJECTION INTRAMUSCULAR; INTRAVENOUS at 14:53

## 2020-10-24 RX ADMIN — AMLODIPINE BESYLATE 10 MG: 5 TABLET ORAL at 08:50

## 2020-10-24 RX ADMIN — MORPHINE SULFATE 2 MG: 2 INJECTION, SOLUTION INTRAMUSCULAR; INTRAVENOUS at 02:00

## 2020-10-24 RX ADMIN — PRAVASTATIN SODIUM 20 MG: 20 TABLET ORAL at 17:11

## 2020-10-24 RX ADMIN — LOSARTAN POTASSIUM 100 MG: 50 TABLET, FILM COATED ORAL at 08:50

## 2020-10-24 RX ADMIN — HYDROCODONE BITARTRATE AND ACETAMINOPHEN 2 TABLET: 5; 325 TABLET ORAL at 18:20

## 2020-10-24 RX ADMIN — POTASSIUM CHLORIDE 20 MEQ: 1500 TABLET, EXTENDED RELEASE ORAL at 08:50

## 2020-10-24 RX ADMIN — POTASSIUM CHLORIDE 20 MEQ: 1500 TABLET, EXTENDED RELEASE ORAL at 17:10

## 2020-10-24 RX ADMIN — HYDROCODONE BITARTRATE AND ACETAMINOPHEN 2 TABLET: 5; 325 TABLET ORAL at 22:24

## 2020-10-24 RX ADMIN — PANTOPRAZOLE SODIUM 40 MG: 40 INJECTION, POWDER, FOR SOLUTION INTRAVENOUS at 08:50

## 2020-10-24 RX ADMIN — SODIUM CHLORIDE 50 ML/HR: 0.9 INJECTION, SOLUTION INTRAVENOUS at 00:20

## 2020-10-24 RX ADMIN — HEPARIN SODIUM 5000 UNITS: 5000 INJECTION INTRAVENOUS; SUBCUTANEOUS at 14:53

## 2020-10-24 RX ADMIN — PIPERACILLIN AND TAZOBACTAM 3.38 G: 3; .375 INJECTION, POWDER, LYOPHILIZED, FOR SOLUTION INTRAVENOUS at 17:36

## 2020-10-24 RX ADMIN — PIPERACILLIN AND TAZOBACTAM 3.38 G: 3; .375 INJECTION, POWDER, LYOPHILIZED, FOR SOLUTION INTRAVENOUS at 00:20

## 2020-10-24 RX ADMIN — PIPERACILLIN AND TAZOBACTAM 3.38 G: 3; .375 INJECTION, POWDER, LYOPHILIZED, FOR SOLUTION INTRAVENOUS at 11:58

## 2020-10-24 RX ADMIN — SODIUM CHLORIDE 50 ML/HR: 0.9 INJECTION, SOLUTION INTRAVENOUS at 22:25

## 2020-10-24 RX ADMIN — LEVOTHYROXINE SODIUM 75 MCG: 75 TABLET ORAL at 05:59

## 2020-10-24 RX ADMIN — HEPARIN SODIUM 5000 UNITS: 5000 INJECTION INTRAVENOUS; SUBCUTANEOUS at 22:24

## 2020-10-25 LAB
BACTERIA UR QL AUTO: ABNORMAL /HPF
BASOPHILS # BLD AUTO: 0.09 THOUSANDS/ΜL (ref 0–0.1)
BASOPHILS NFR BLD AUTO: 1 % (ref 0–1)
BILIRUB UR QL STRIP: NEGATIVE
CLARITY UR: CLEAR
COLOR UR: YELLOW
EOSINOPHIL # BLD AUTO: 0.21 THOUSAND/ΜL (ref 0–0.61)
EOSINOPHIL NFR BLD AUTO: 1 % (ref 0–6)
ERYTHROCYTE [DISTWIDTH] IN BLOOD BY AUTOMATED COUNT: 13.9 % (ref 11.6–15.1)
GLUCOSE UR STRIP-MCNC: NEGATIVE MG/DL
HCT VFR BLD AUTO: 30.2 % (ref 36.5–49.3)
HGB BLD-MCNC: 10 G/DL (ref 12–17)
HGB UR QL STRIP.AUTO: NEGATIVE
IMM GRANULOCYTES # BLD AUTO: 0.27 THOUSAND/UL (ref 0–0.2)
IMM GRANULOCYTES NFR BLD AUTO: 2 % (ref 0–2)
KETONES UR STRIP-MCNC: NEGATIVE MG/DL
LEUKOCYTE ESTERASE UR QL STRIP: NEGATIVE
LYMPHOCYTES # BLD AUTO: 1.98 THOUSANDS/ΜL (ref 0.6–4.47)
LYMPHOCYTES NFR BLD AUTO: 12 % (ref 14–44)
MCH RBC QN AUTO: 31.4 PG (ref 26.8–34.3)
MCHC RBC AUTO-ENTMCNC: 33.1 G/DL (ref 31.4–37.4)
MCV RBC AUTO: 95 FL (ref 82–98)
MONOCYTES # BLD AUTO: 1.89 THOUSAND/ΜL (ref 0.17–1.22)
MONOCYTES NFR BLD AUTO: 11 % (ref 4–12)
MUCOUS THREADS UR QL AUTO: ABNORMAL
NEUTROPHILS # BLD AUTO: 12.12 THOUSANDS/ΜL (ref 1.85–7.62)
NEUTS SEG NFR BLD AUTO: 73 % (ref 43–75)
NITRITE UR QL STRIP: NEGATIVE
NON-SQ EPI CELLS URNS QL MICRO: ABNORMAL /HPF
NRBC BLD AUTO-RTO: 0 /100 WBCS
PH UR STRIP.AUTO: 5.5 [PH]
PLATELET # BLD AUTO: 561 THOUSANDS/UL (ref 149–390)
PMV BLD AUTO: 8.6 FL (ref 8.9–12.7)
PROT UR STRIP-MCNC: NEGATIVE MG/DL
RBC # BLD AUTO: 3.18 MILLION/UL (ref 3.88–5.62)
RBC #/AREA URNS AUTO: ABNORMAL /HPF
SP GR UR STRIP.AUTO: 1.02 (ref 1–1.03)
UROBILINOGEN UR QL STRIP.AUTO: 0.2 E.U./DL
WBC # BLD AUTO: 16.56 THOUSAND/UL (ref 4.31–10.16)
WBC #/AREA URNS AUTO: ABNORMAL /HPF

## 2020-10-25 PROCEDURE — C9113 INJ PANTOPRAZOLE SODIUM, VIA: HCPCS | Performed by: PHYSICIAN ASSISTANT

## 2020-10-25 PROCEDURE — 99233 SBSQ HOSP IP/OBS HIGH 50: CPT | Performed by: SURGERY

## 2020-10-25 PROCEDURE — 99232 SBSQ HOSP IP/OBS MODERATE 35: CPT | Performed by: INTERNAL MEDICINE

## 2020-10-25 PROCEDURE — 85025 COMPLETE CBC W/AUTO DIFF WBC: CPT | Performed by: PHYSICIAN ASSISTANT

## 2020-10-25 PROCEDURE — 81001 URINALYSIS AUTO W/SCOPE: CPT | Performed by: INTERNAL MEDICINE

## 2020-10-25 RX ORDER — LANOLIN ALCOHOL/MO/W.PET/CERES
3 CREAM (GRAM) TOPICAL ONCE
Status: COMPLETED | OUTPATIENT
Start: 2020-10-25 | End: 2020-10-25

## 2020-10-25 RX ADMIN — HEPARIN SODIUM 5000 UNITS: 5000 INJECTION INTRAVENOUS; SUBCUTANEOUS at 06:16

## 2020-10-25 RX ADMIN — PIPERACILLIN AND TAZOBACTAM 3.38 G: 3; .375 INJECTION, POWDER, LYOPHILIZED, FOR SOLUTION INTRAVENOUS at 06:17

## 2020-10-25 RX ADMIN — Medication 1000 MG: at 16:17

## 2020-10-25 RX ADMIN — PIPERACILLIN AND TAZOBACTAM 3.38 G: 3; .375 INJECTION, POWDER, LYOPHILIZED, FOR SOLUTION INTRAVENOUS at 00:29

## 2020-10-25 RX ADMIN — LEVOTHYROXINE SODIUM 75 MCG: 75 TABLET ORAL at 06:16

## 2020-10-25 RX ADMIN — LOSARTAN POTASSIUM 100 MG: 50 TABLET, FILM COATED ORAL at 08:45

## 2020-10-25 RX ADMIN — POTASSIUM CHLORIDE 20 MEQ: 1500 TABLET, EXTENDED RELEASE ORAL at 17:06

## 2020-10-25 RX ADMIN — PANTOPRAZOLE SODIUM 40 MG: 40 INJECTION, POWDER, FOR SOLUTION INTRAVENOUS at 08:45

## 2020-10-25 RX ADMIN — HEPARIN SODIUM 5000 UNITS: 5000 INJECTION INTRAVENOUS; SUBCUTANEOUS at 13:58

## 2020-10-25 RX ADMIN — SODIUM CHLORIDE 50 ML/HR: 0.9 INJECTION, SOLUTION INTRAVENOUS at 17:06

## 2020-10-25 RX ADMIN — MORPHINE SULFATE 2 MG: 2 INJECTION, SOLUTION INTRAMUSCULAR; INTRAVENOUS at 12:05

## 2020-10-25 RX ADMIN — HYDROCODONE BITARTRATE AND ACETAMINOPHEN 2 TABLET: 5; 325 TABLET ORAL at 06:55

## 2020-10-25 RX ADMIN — HEPARIN SODIUM 5000 UNITS: 5000 INJECTION INTRAVENOUS; SUBCUTANEOUS at 21:10

## 2020-10-25 RX ADMIN — MELATONIN TAB 3 MG 6 MG: 3 TAB at 21:10

## 2020-10-25 RX ADMIN — PRAVASTATIN SODIUM 20 MG: 20 TABLET ORAL at 16:17

## 2020-10-25 RX ADMIN — PIPERACILLIN AND TAZOBACTAM 3.38 G: 3; .375 INJECTION, POWDER, LYOPHILIZED, FOR SOLUTION INTRAVENOUS at 12:36

## 2020-10-25 RX ADMIN — POTASSIUM CHLORIDE 20 MEQ: 1500 TABLET, EXTENDED RELEASE ORAL at 08:45

## 2020-10-25 RX ADMIN — MORPHINE SULFATE 2 MG: 2 INJECTION, SOLUTION INTRAMUSCULAR; INTRAVENOUS at 19:21

## 2020-10-25 RX ADMIN — Medication: at 21:08

## 2020-10-25 RX ADMIN — AMLODIPINE BESYLATE 10 MG: 5 TABLET ORAL at 08:45

## 2020-10-25 RX ADMIN — HYDROCODONE BITARTRATE AND ACETAMINOPHEN 2 TABLET: 5; 325 TABLET ORAL at 17:12

## 2020-10-25 RX ADMIN — Medication 3 MG: at 22:40

## 2020-10-26 PROBLEM — E63.9 IMPAIRED NUTRITION: Status: RESOLVED | Noted: 2020-10-19 | Resolved: 2020-10-26

## 2020-10-26 LAB
ALBUMIN SERPL BCP-MCNC: 1.6 G/DL (ref 3.5–5)
ALP SERPL-CCNC: 252 U/L (ref 46–116)
ALT SERPL W P-5'-P-CCNC: 12 U/L (ref 12–78)
ANION GAP SERPL CALCULATED.3IONS-SCNC: 6 MMOL/L (ref 4–13)
AST SERPL W P-5'-P-CCNC: 12 U/L (ref 5–45)
BACTERIA SPEC ANAEROBE CULT: ABNORMAL
BACTERIA SPEC ANAEROBE CULT: NO GROWTH
BACTERIA SPEC BFLD CULT: ABNORMAL
BILIRUB SERPL-MCNC: 0.4 MG/DL (ref 0.2–1)
BUN SERPL-MCNC: 7 MG/DL (ref 5–25)
CALCIUM ALBUM COR SERPL-MCNC: 9.8 MG/DL (ref 8.3–10.1)
CALCIUM SERPL-MCNC: 7.9 MG/DL (ref 8.3–10.1)
CHLORIDE SERPL-SCNC: 104 MMOL/L (ref 100–108)
CO2 SERPL-SCNC: 26 MMOL/L (ref 21–32)
CREAT SERPL-MCNC: 0.75 MG/DL (ref 0.6–1.3)
ERYTHROCYTE [DISTWIDTH] IN BLOOD BY AUTOMATED COUNT: 13.8 % (ref 11.6–15.1)
GFR SERPL CREATININE-BSD FRML MDRD: 106 ML/MIN/1.73SQ M
GLUCOSE SERPL-MCNC: 120 MG/DL (ref 65–140)
GRAM STN SPEC: ABNORMAL
HCT VFR BLD AUTO: 31.4 % (ref 36.5–49.3)
HGB BLD-MCNC: 10 G/DL (ref 12–17)
MAGNESIUM SERPL-MCNC: 1.7 MG/DL (ref 1.6–2.6)
MCH RBC QN AUTO: 30.6 PG (ref 26.8–34.3)
MCHC RBC AUTO-ENTMCNC: 31.8 G/DL (ref 31.4–37.4)
MCV RBC AUTO: 96 FL (ref 82–98)
PHOSPHATE SERPL-MCNC: 3.4 MG/DL (ref 2.7–4.5)
PLATELET # BLD AUTO: 545 THOUSANDS/UL (ref 149–390)
PMV BLD AUTO: 8.6 FL (ref 8.9–12.7)
POTASSIUM SERPL-SCNC: 4.3 MMOL/L (ref 3.5–5.3)
PROT SERPL-MCNC: 6.4 G/DL (ref 6.4–8.2)
RBC # BLD AUTO: 3.27 MILLION/UL (ref 3.88–5.62)
SODIUM SERPL-SCNC: 136 MMOL/L (ref 136–145)
WBC # BLD AUTO: 15.96 THOUSAND/UL (ref 4.31–10.16)

## 2020-10-26 PROCEDURE — 85027 COMPLETE CBC AUTOMATED: CPT | Performed by: INTERNAL MEDICINE

## 2020-10-26 PROCEDURE — 83735 ASSAY OF MAGNESIUM: CPT | Performed by: PHYSICIAN ASSISTANT

## 2020-10-26 PROCEDURE — C9113 INJ PANTOPRAZOLE SODIUM, VIA: HCPCS | Performed by: PHYSICIAN ASSISTANT

## 2020-10-26 PROCEDURE — 99024 POSTOP FOLLOW-UP VISIT: CPT | Performed by: PHYSICIAN ASSISTANT

## 2020-10-26 PROCEDURE — 80053 COMPREHEN METABOLIC PANEL: CPT | Performed by: INTERNAL MEDICINE

## 2020-10-26 PROCEDURE — 99232 SBSQ HOSP IP/OBS MODERATE 35: CPT | Performed by: INTERNAL MEDICINE

## 2020-10-26 PROCEDURE — 84100 ASSAY OF PHOSPHORUS: CPT | Performed by: PHYSICIAN ASSISTANT

## 2020-10-26 RX ORDER — OXYCODONE HYDROCHLORIDE AND ACETAMINOPHEN 5; 325 MG/1; MG/1
1 TABLET ORAL EVERY 4 HOURS PRN
Status: DISCONTINUED | OUTPATIENT
Start: 2020-10-26 | End: 2020-11-02

## 2020-10-26 RX ORDER — ACETAMINOPHEN 325 MG/1
650 TABLET ORAL EVERY 6 HOURS PRN
Status: DISCONTINUED | OUTPATIENT
Start: 2020-10-26 | End: 2020-11-02

## 2020-10-26 RX ORDER — OXYCODONE HYDROCHLORIDE AND ACETAMINOPHEN 5; 325 MG/1; MG/1
2 TABLET ORAL EVERY 4 HOURS PRN
Status: DISCONTINUED | OUTPATIENT
Start: 2020-10-26 | End: 2020-11-02

## 2020-10-26 RX ADMIN — MORPHINE SULFATE 2 MG: 2 INJECTION, SOLUTION INTRAMUSCULAR; INTRAVENOUS at 04:44

## 2020-10-26 RX ADMIN — LOSARTAN POTASSIUM 100 MG: 50 TABLET, FILM COATED ORAL at 08:56

## 2020-10-26 RX ADMIN — AMLODIPINE BESYLATE 10 MG: 5 TABLET ORAL at 08:56

## 2020-10-26 RX ADMIN — LEVOTHYROXINE SODIUM 75 MCG: 75 TABLET ORAL at 05:01

## 2020-10-26 RX ADMIN — Medication 1000 MG: at 14:56

## 2020-10-26 RX ADMIN — PANTOPRAZOLE SODIUM 40 MG: 40 INJECTION, POWDER, FOR SOLUTION INTRAVENOUS at 08:57

## 2020-10-26 RX ADMIN — HEPARIN SODIUM 5000 UNITS: 5000 INJECTION INTRAVENOUS; SUBCUTANEOUS at 05:01

## 2020-10-26 RX ADMIN — PRAVASTATIN SODIUM 20 MG: 20 TABLET ORAL at 17:24

## 2020-10-26 RX ADMIN — HEPARIN SODIUM 5000 UNITS: 5000 INJECTION INTRAVENOUS; SUBCUTANEOUS at 22:46

## 2020-10-26 RX ADMIN — MELATONIN TAB 3 MG 6 MG: 3 TAB at 22:45

## 2020-10-26 RX ADMIN — HEPARIN SODIUM 5000 UNITS: 5000 INJECTION INTRAVENOUS; SUBCUTANEOUS at 14:55

## 2020-10-26 RX ADMIN — Medication: at 22:44

## 2020-10-26 RX ADMIN — POTASSIUM CHLORIDE 20 MEQ: 1500 TABLET, EXTENDED RELEASE ORAL at 08:57

## 2020-10-26 RX ADMIN — MORPHINE SULFATE 2 MG: 2 INJECTION, SOLUTION INTRAMUSCULAR; INTRAVENOUS at 09:06

## 2020-10-26 RX ADMIN — OXYCODONE HYDROCHLORIDE AND ACETAMINOPHEN 1 TABLET: 5; 325 TABLET ORAL at 23:30

## 2020-10-26 RX ADMIN — SODIUM CHLORIDE 50 ML/HR: 0.9 INJECTION, SOLUTION INTRAVENOUS at 14:56

## 2020-10-26 RX ADMIN — POTASSIUM CHLORIDE 20 MEQ: 1500 TABLET, EXTENDED RELEASE ORAL at 17:24

## 2020-10-26 RX ADMIN — OXYCODONE HYDROCHLORIDE AND ACETAMINOPHEN 1 TABLET: 5; 325 TABLET ORAL at 17:36

## 2020-10-27 ENCOUNTER — APPOINTMENT (INPATIENT)
Dept: CT IMAGING | Facility: HOSPITAL | Age: 50
DRG: 853 | End: 2020-10-27
Payer: COMMERCIAL

## 2020-10-27 LAB
ALBUMIN SERPL BCP-MCNC: 1.7 G/DL (ref 3.5–5)
ALP SERPL-CCNC: 77 U/L (ref 46–116)
ALT SERPL W P-5'-P-CCNC: 16 U/L (ref 12–78)
ANION GAP SERPL CALCULATED.3IONS-SCNC: 5 MMOL/L (ref 4–13)
AST SERPL W P-5'-P-CCNC: 14 U/L (ref 5–45)
BASOPHILS # BLD MANUAL: 0 THOUSAND/UL (ref 0–0.1)
BASOPHILS NFR MAR MANUAL: 0 % (ref 0–1)
BILIRUB SERPL-MCNC: 0.1 MG/DL (ref 0.2–1)
BUN SERPL-MCNC: 6 MG/DL (ref 5–25)
CALCIUM ALBUM COR SERPL-MCNC: 9.6 MG/DL (ref 8.3–10.1)
CALCIUM SERPL-MCNC: 7.8 MG/DL (ref 8.3–10.1)
CHLORIDE SERPL-SCNC: 103 MMOL/L (ref 100–108)
CO2 SERPL-SCNC: 27 MMOL/L (ref 21–32)
CREAT SERPL-MCNC: 0.7 MG/DL (ref 0.6–1.3)
EOSINOPHIL # BLD MANUAL: 0.13 THOUSAND/UL (ref 0–0.4)
EOSINOPHIL NFR BLD MANUAL: 1 % (ref 0–6)
ERYTHROCYTE [DISTWIDTH] IN BLOOD BY AUTOMATED COUNT: 13.8 % (ref 11.6–15.1)
GFR SERPL CREATININE-BSD FRML MDRD: 109 ML/MIN/1.73SQ M
GLUCOSE SERPL-MCNC: 122 MG/DL (ref 65–140)
HCT VFR BLD AUTO: 33.1 % (ref 36.5–49.3)
HGB BLD-MCNC: 10.8 G/DL (ref 12–17)
LYMPHOCYTES # BLD AUTO: 26 % (ref 14–44)
LYMPHOCYTES # BLD AUTO: 3.32 THOUSAND/UL (ref 0.6–4.47)
MCH RBC QN AUTO: 31 PG (ref 26.8–34.3)
MCHC RBC AUTO-ENTMCNC: 32.6 G/DL (ref 31.4–37.4)
MCV RBC AUTO: 95 FL (ref 82–98)
MONOCYTES # BLD AUTO: 0.51 THOUSAND/UL (ref 0–1.22)
MONOCYTES NFR BLD: 4 % (ref 4–12)
NEUTROPHILS # BLD MANUAL: 8.18 THOUSAND/UL (ref 1.85–7.62)
NEUTS SEG NFR BLD AUTO: 64 % (ref 43–75)
NRBC BLD AUTO-RTO: 0 /100 WBCS
PLATELET # BLD AUTO: 528 THOUSANDS/UL (ref 149–390)
PLATELET BLD QL SMEAR: ABNORMAL
PMV BLD AUTO: 8.7 FL (ref 8.9–12.7)
POTASSIUM SERPL-SCNC: 4.4 MMOL/L (ref 3.5–5.3)
PROT SERPL-MCNC: 6.2 G/DL (ref 6.4–8.2)
RBC # BLD AUTO: 3.48 MILLION/UL (ref 3.88–5.62)
SODIUM SERPL-SCNC: 135 MMOL/L (ref 136–145)
TOTAL CELLS COUNTED SPEC: 100
VARIANT LYMPHS # BLD AUTO: 5 %
WBC # BLD AUTO: 12.78 THOUSAND/UL (ref 4.31–10.16)

## 2020-10-27 PROCEDURE — 80053 COMPREHEN METABOLIC PANEL: CPT | Performed by: PHYSICIAN ASSISTANT

## 2020-10-27 PROCEDURE — 99024 POSTOP FOLLOW-UP VISIT: CPT | Performed by: PHYSICIAN ASSISTANT

## 2020-10-27 PROCEDURE — 74177 CT ABD & PELVIS W/CONTRAST: CPT

## 2020-10-27 PROCEDURE — C9113 INJ PANTOPRAZOLE SODIUM, VIA: HCPCS | Performed by: PHYSICIAN ASSISTANT

## 2020-10-27 PROCEDURE — G1004 CDSM NDSC: HCPCS

## 2020-10-27 PROCEDURE — 99232 SBSQ HOSP IP/OBS MODERATE 35: CPT | Performed by: INTERNAL MEDICINE

## 2020-10-27 PROCEDURE — 85007 BL SMEAR W/DIFF WBC COUNT: CPT | Performed by: PHYSICIAN ASSISTANT

## 2020-10-27 PROCEDURE — 85027 COMPLETE CBC AUTOMATED: CPT | Performed by: PHYSICIAN ASSISTANT

## 2020-10-27 RX ORDER — PANTOPRAZOLE SODIUM 40 MG/1
40 TABLET, DELAYED RELEASE ORAL
Status: DISCONTINUED | OUTPATIENT
Start: 2020-10-28 | End: 2020-11-07 | Stop reason: HOSPADM

## 2020-10-27 RX ADMIN — HEPARIN SODIUM 5000 UNITS: 5000 INJECTION INTRAVENOUS; SUBCUTANEOUS at 15:21

## 2020-10-27 RX ADMIN — VANCOMYCIN HYDROCHLORIDE 1250 MG: 1 INJECTION, POWDER, LYOPHILIZED, FOR SOLUTION INTRAVENOUS at 16:21

## 2020-10-27 RX ADMIN — MELATONIN TAB 3 MG 6 MG: 3 TAB at 22:25

## 2020-10-27 RX ADMIN — IOHEXOL 100 ML: 350 INJECTION, SOLUTION INTRAVENOUS at 20:37

## 2020-10-27 RX ADMIN — PANTOPRAZOLE SODIUM 40 MG: 40 INJECTION, POWDER, FOR SOLUTION INTRAVENOUS at 09:34

## 2020-10-27 RX ADMIN — IOHEXOL 50 ML: 240 INJECTION, SOLUTION INTRATHECAL; INTRAVASCULAR; INTRAVENOUS; ORAL at 20:37

## 2020-10-27 RX ADMIN — POTASSIUM CHLORIDE 20 MEQ: 1500 TABLET, EXTENDED RELEASE ORAL at 17:25

## 2020-10-27 RX ADMIN — OXYCODONE HYDROCHLORIDE AND ACETAMINOPHEN 2 TABLET: 5; 325 TABLET ORAL at 20:10

## 2020-10-27 RX ADMIN — PRAVASTATIN SODIUM 20 MG: 20 TABLET ORAL at 15:41

## 2020-10-27 RX ADMIN — LOSARTAN POTASSIUM 100 MG: 50 TABLET, FILM COATED ORAL at 09:33

## 2020-10-27 RX ADMIN — AMLODIPINE BESYLATE 10 MG: 5 TABLET ORAL at 09:34

## 2020-10-27 RX ADMIN — HEPARIN SODIUM 5000 UNITS: 5000 INJECTION INTRAVENOUS; SUBCUTANEOUS at 06:41

## 2020-10-27 RX ADMIN — Medication 1000 MG: at 15:21

## 2020-10-27 RX ADMIN — POTASSIUM CHLORIDE 20 MEQ: 1500 TABLET, EXTENDED RELEASE ORAL at 09:33

## 2020-10-27 RX ADMIN — OXYCODONE HYDROCHLORIDE AND ACETAMINOPHEN 2 TABLET: 5; 325 TABLET ORAL at 06:38

## 2020-10-27 RX ADMIN — LEVOTHYROXINE SODIUM 75 MCG: 75 TABLET ORAL at 06:38

## 2020-10-28 ENCOUNTER — PREP FOR PROCEDURE (OUTPATIENT)
Dept: INTERVENTIONAL RADIOLOGY/VASCULAR | Facility: CLINIC | Age: 50
End: 2020-10-28

## 2020-10-28 DIAGNOSIS — K57.20 DIVERTICULITIS OF LARGE INTESTINE WITH PERFORATION WITHOUT BLEEDING: Primary | ICD-10-CM

## 2020-10-28 LAB
ANION GAP SERPL CALCULATED.3IONS-SCNC: 5 MMOL/L (ref 4–13)
BACTERIA SPEC BFLD CULT: ABNORMAL
BASOPHILS # BLD MANUAL: 0 THOUSAND/UL (ref 0–0.1)
BASOPHILS NFR MAR MANUAL: 0 % (ref 0–1)
BUN SERPL-MCNC: 8 MG/DL (ref 5–25)
CALCIUM SERPL-MCNC: 8.3 MG/DL (ref 8.3–10.1)
CHLORIDE SERPL-SCNC: 104 MMOL/L (ref 100–108)
CO2 SERPL-SCNC: 28 MMOL/L (ref 21–32)
CREAT SERPL-MCNC: 0.67 MG/DL (ref 0.6–1.3)
EOSINOPHIL # BLD MANUAL: 0.25 THOUSAND/UL (ref 0–0.4)
EOSINOPHIL NFR BLD MANUAL: 2 % (ref 0–6)
ERYTHROCYTE [DISTWIDTH] IN BLOOD BY AUTOMATED COUNT: 13.8 % (ref 11.6–15.1)
GFR SERPL CREATININE-BSD FRML MDRD: 111 ML/MIN/1.73SQ M
GLUCOSE SERPL-MCNC: 117 MG/DL (ref 65–140)
GRAM STN SPEC: ABNORMAL
GRAM STN SPEC: ABNORMAL
HCT VFR BLD AUTO: 32.7 % (ref 36.5–49.3)
HGB BLD-MCNC: 10.6 G/DL (ref 12–17)
LG PLATELETS BLD QL SMEAR: PRESENT
LYMPHOCYTES # BLD AUTO: 1.52 THOUSAND/UL (ref 0.6–4.47)
LYMPHOCYTES # BLD AUTO: 12 % (ref 14–44)
MAGNESIUM SERPL-MCNC: 2.4 MG/DL (ref 1.6–2.6)
MCH RBC QN AUTO: 30.7 PG (ref 26.8–34.3)
MCHC RBC AUTO-ENTMCNC: 32.4 G/DL (ref 31.4–37.4)
MCV RBC AUTO: 95 FL (ref 82–98)
MONOCYTES # BLD AUTO: 1.01 THOUSAND/UL (ref 0–1.22)
MONOCYTES NFR BLD: 8 % (ref 4–12)
NEUTROPHILS # BLD MANUAL: 9.73 THOUSAND/UL (ref 1.85–7.62)
NEUTS SEG NFR BLD AUTO: 77 % (ref 43–75)
NRBC BLD AUTO-RTO: 0 /100 WBCS
PHOSPHATE SERPL-MCNC: 3.5 MG/DL (ref 2.7–4.5)
PLATELET # BLD AUTO: 526 THOUSANDS/UL (ref 149–390)
PLATELET BLD QL SMEAR: ABNORMAL
PMV BLD AUTO: 8.3 FL (ref 8.9–12.7)
POTASSIUM SERPL-SCNC: 4.2 MMOL/L (ref 3.5–5.3)
RBC # BLD AUTO: 3.45 MILLION/UL (ref 3.88–5.62)
SODIUM SERPL-SCNC: 137 MMOL/L (ref 136–145)
TOTAL CELLS COUNTED SPEC: 100
VARIANT LYMPHS # BLD AUTO: 1 %
WBC # BLD AUTO: 12.64 THOUSAND/UL (ref 4.31–10.16)

## 2020-10-28 PROCEDURE — 99024 POSTOP FOLLOW-UP VISIT: CPT | Performed by: PHYSICIAN ASSISTANT

## 2020-10-28 PROCEDURE — 85007 BL SMEAR W/DIFF WBC COUNT: CPT | Performed by: PHYSICIAN ASSISTANT

## 2020-10-28 PROCEDURE — 84100 ASSAY OF PHOSPHORUS: CPT | Performed by: PHYSICIAN ASSISTANT

## 2020-10-28 PROCEDURE — 85027 COMPLETE CBC AUTOMATED: CPT | Performed by: PHYSICIAN ASSISTANT

## 2020-10-28 PROCEDURE — 80048 BASIC METABOLIC PNL TOTAL CA: CPT | Performed by: PHYSICIAN ASSISTANT

## 2020-10-28 PROCEDURE — 99232 SBSQ HOSP IP/OBS MODERATE 35: CPT | Performed by: INTERNAL MEDICINE

## 2020-10-28 PROCEDURE — 83735 ASSAY OF MAGNESIUM: CPT | Performed by: PHYSICIAN ASSISTANT

## 2020-10-28 RX ORDER — SODIUM CHLORIDE 9 MG/ML
10 INJECTION INTRAVENOUS DAILY
Qty: 60 SYRINGE | Refills: 3 | Status: SHIPPED | OUTPATIENT
Start: 2020-10-28 | End: 2021-03-12 | Stop reason: ALTCHOICE

## 2020-10-28 RX ADMIN — PANTOPRAZOLE SODIUM 40 MG: 40 TABLET, DELAYED RELEASE ORAL at 05:05

## 2020-10-28 RX ADMIN — HEPARIN SODIUM 5000 UNITS: 5000 INJECTION INTRAVENOUS; SUBCUTANEOUS at 13:37

## 2020-10-28 RX ADMIN — OXYCODONE HYDROCHLORIDE AND ACETAMINOPHEN 2 TABLET: 5; 325 TABLET ORAL at 17:56

## 2020-10-28 RX ADMIN — Medication: at 01:06

## 2020-10-28 RX ADMIN — OXYCODONE HYDROCHLORIDE AND ACETAMINOPHEN 2 TABLET: 5; 325 TABLET ORAL at 09:04

## 2020-10-28 RX ADMIN — Medication 1000 MG: at 16:20

## 2020-10-28 RX ADMIN — PRAVASTATIN SODIUM 20 MG: 20 TABLET ORAL at 17:37

## 2020-10-28 RX ADMIN — POTASSIUM CHLORIDE 20 MEQ: 1500 TABLET, EXTENDED RELEASE ORAL at 17:37

## 2020-10-28 RX ADMIN — HEPARIN SODIUM 5000 UNITS: 5000 INJECTION INTRAVENOUS; SUBCUTANEOUS at 21:03

## 2020-10-28 RX ADMIN — VANCOMYCIN HYDROCHLORIDE 1250 MG: 1 INJECTION, POWDER, LYOPHILIZED, FOR SOLUTION INTRAVENOUS at 03:57

## 2020-10-28 RX ADMIN — LEVOTHYROXINE SODIUM 75 MCG: 75 TABLET ORAL at 05:05

## 2020-10-28 RX ADMIN — OXYCODONE HYDROCHLORIDE AND ACETAMINOPHEN 1 TABLET: 5; 325 TABLET ORAL at 22:07

## 2020-10-28 RX ADMIN — LOSARTAN POTASSIUM 100 MG: 50 TABLET, FILM COATED ORAL at 09:04

## 2020-10-28 RX ADMIN — SODIUM CHLORIDE 50 ML/HR: 0.9 INJECTION, SOLUTION INTRAVENOUS at 13:37

## 2020-10-28 RX ADMIN — AMLODIPINE BESYLATE 10 MG: 5 TABLET ORAL at 09:04

## 2020-10-28 RX ADMIN — MELATONIN TAB 3 MG 6 MG: 3 TAB at 21:03

## 2020-10-28 RX ADMIN — POTASSIUM CHLORIDE 20 MEQ: 1500 TABLET, EXTENDED RELEASE ORAL at 09:04

## 2020-10-29 LAB
ANION GAP SERPL CALCULATED.3IONS-SCNC: 6 MMOL/L (ref 4–13)
BILIRUB UR QL STRIP: NEGATIVE
BUN SERPL-MCNC: 9 MG/DL (ref 5–25)
C DIFF TOX B TCDB STL QL NAA+PROBE: NEGATIVE
CALCIUM SERPL-MCNC: 8.6 MG/DL (ref 8.3–10.1)
CHLORIDE SERPL-SCNC: 102 MMOL/L (ref 100–108)
CLARITY UR: CLEAR
CO2 SERPL-SCNC: 27 MMOL/L (ref 21–32)
COLOR UR: YELLOW
CREAT SERPL-MCNC: 0.77 MG/DL (ref 0.6–1.3)
ERYTHROCYTE [DISTWIDTH] IN BLOOD BY AUTOMATED COUNT: 14.1 % (ref 11.6–15.1)
GFR SERPL CREATININE-BSD FRML MDRD: 105 ML/MIN/1.73SQ M
GLUCOSE SERPL-MCNC: 108 MG/DL (ref 65–140)
GLUCOSE UR STRIP-MCNC: NEGATIVE MG/DL
HCT VFR BLD AUTO: 33.3 % (ref 36.5–49.3)
HGB BLD-MCNC: 10.7 G/DL (ref 12–17)
HGB UR QL STRIP.AUTO: NEGATIVE
KETONES UR STRIP-MCNC: NEGATIVE MG/DL
LEUKOCYTE ESTERASE UR QL STRIP: NEGATIVE
MCH RBC QN AUTO: 30.6 PG (ref 26.8–34.3)
MCHC RBC AUTO-ENTMCNC: 32.1 G/DL (ref 31.4–37.4)
MCV RBC AUTO: 95 FL (ref 82–98)
NITRITE UR QL STRIP: NEGATIVE
NRBC BLD AUTO-RTO: 0 /100 WBCS
PH UR STRIP.AUTO: 7 [PH]
PLATELET # BLD AUTO: 536 THOUSANDS/UL (ref 149–390)
PMV BLD AUTO: 8.9 FL (ref 8.9–12.7)
POTASSIUM SERPL-SCNC: 4.5 MMOL/L (ref 3.5–5.3)
PROT UR STRIP-MCNC: NEGATIVE MG/DL
RBC # BLD AUTO: 3.5 MILLION/UL (ref 3.88–5.62)
SODIUM SERPL-SCNC: 135 MMOL/L (ref 136–145)
SP GR UR STRIP.AUTO: 1.01 (ref 1–1.03)
UROBILINOGEN UR QL STRIP.AUTO: 0.2 E.U./DL
WBC # BLD AUTO: 22.24 THOUSAND/UL (ref 4.31–10.16)

## 2020-10-29 PROCEDURE — 81003 URINALYSIS AUTO W/O SCOPE: CPT | Performed by: INTERNAL MEDICINE

## 2020-10-29 PROCEDURE — 99024 POSTOP FOLLOW-UP VISIT: CPT | Performed by: PHYSICIAN ASSISTANT

## 2020-10-29 PROCEDURE — 87040 BLOOD CULTURE FOR BACTERIA: CPT | Performed by: INTERNAL MEDICINE

## 2020-10-29 PROCEDURE — 99232 SBSQ HOSP IP/OBS MODERATE 35: CPT | Performed by: INTERNAL MEDICINE

## 2020-10-29 PROCEDURE — 80048 BASIC METABOLIC PNL TOTAL CA: CPT | Performed by: PHYSICIAN ASSISTANT

## 2020-10-29 PROCEDURE — 87493 C DIFF AMPLIFIED PROBE: CPT | Performed by: INTERNAL MEDICINE

## 2020-10-29 PROCEDURE — 85027 COMPLETE CBC AUTOMATED: CPT | Performed by: PHYSICIAN ASSISTANT

## 2020-10-29 RX ADMIN — OXYCODONE HYDROCHLORIDE AND ACETAMINOPHEN 2 TABLET: 5; 325 TABLET ORAL at 14:15

## 2020-10-29 RX ADMIN — Medication 125 MG: at 17:07

## 2020-10-29 RX ADMIN — Medication 1000 MG: at 14:07

## 2020-10-29 RX ADMIN — LEVOTHYROXINE SODIUM 75 MCG: 75 TABLET ORAL at 05:01

## 2020-10-29 RX ADMIN — MELATONIN TAB 3 MG 6 MG: 3 TAB at 21:06

## 2020-10-29 RX ADMIN — POTASSIUM CHLORIDE 20 MEQ: 1500 TABLET, EXTENDED RELEASE ORAL at 08:51

## 2020-10-29 RX ADMIN — PANTOPRAZOLE SODIUM 40 MG: 40 TABLET, DELAYED RELEASE ORAL at 05:01

## 2020-10-29 RX ADMIN — HEPARIN SODIUM 5000 UNITS: 5000 INJECTION INTRAVENOUS; SUBCUTANEOUS at 21:05

## 2020-10-29 RX ADMIN — POTASSIUM CHLORIDE 20 MEQ: 1500 TABLET, EXTENDED RELEASE ORAL at 17:07

## 2020-10-29 RX ADMIN — PRAVASTATIN SODIUM 20 MG: 20 TABLET ORAL at 16:56

## 2020-10-29 RX ADMIN — LOSARTAN POTASSIUM 100 MG: 50 TABLET, FILM COATED ORAL at 08:51

## 2020-10-29 RX ADMIN — AMLODIPINE BESYLATE 10 MG: 5 TABLET ORAL at 08:51

## 2020-10-29 RX ADMIN — SODIUM CHLORIDE 50 ML/HR: 0.9 INJECTION, SOLUTION INTRAVENOUS at 09:13

## 2020-10-29 RX ADMIN — OXYCODONE HYDROCHLORIDE AND ACETAMINOPHEN 2 TABLET: 5; 325 TABLET ORAL at 20:52

## 2020-10-29 RX ADMIN — Medication 125 MG: at 12:15

## 2020-10-29 RX ADMIN — OXYCODONE HYDROCHLORIDE AND ACETAMINOPHEN 2 TABLET: 5; 325 TABLET ORAL at 05:00

## 2020-10-29 RX ADMIN — HEPARIN SODIUM 5000 UNITS: 5000 INJECTION INTRAVENOUS; SUBCUTANEOUS at 05:00

## 2020-10-29 RX ADMIN — HEPARIN SODIUM 5000 UNITS: 5000 INJECTION INTRAVENOUS; SUBCUTANEOUS at 14:07

## 2020-10-30 LAB
BASOPHILS # BLD AUTO: 0.07 THOUSANDS/ΜL (ref 0–0.1)
BASOPHILS NFR BLD AUTO: 0 % (ref 0–1)
EOSINOPHIL # BLD AUTO: 0.3 THOUSAND/ΜL (ref 0–0.61)
EOSINOPHIL NFR BLD AUTO: 2 % (ref 0–6)
ERYTHROCYTE [DISTWIDTH] IN BLOOD BY AUTOMATED COUNT: 14.1 % (ref 11.6–15.1)
HCT VFR BLD AUTO: 33 % (ref 36.5–49.3)
HGB BLD-MCNC: 10.5 G/DL (ref 12–17)
IMM GRANULOCYTES # BLD AUTO: 0.29 THOUSAND/UL (ref 0–0.2)
IMM GRANULOCYTES NFR BLD AUTO: 2 % (ref 0–2)
LYMPHOCYTES # BLD AUTO: 1.92 THOUSANDS/ΜL (ref 0.6–4.47)
LYMPHOCYTES NFR BLD AUTO: 10 % (ref 14–44)
MCH RBC QN AUTO: 30.4 PG (ref 26.8–34.3)
MCHC RBC AUTO-ENTMCNC: 31.8 G/DL (ref 31.4–37.4)
MCV RBC AUTO: 96 FL (ref 82–98)
MONOCYTES # BLD AUTO: 1.38 THOUSAND/ΜL (ref 0.17–1.22)
MONOCYTES NFR BLD AUTO: 7 % (ref 4–12)
NEUTROPHILS # BLD AUTO: 15.81 THOUSANDS/ΜL (ref 1.85–7.62)
NEUTS SEG NFR BLD AUTO: 79 % (ref 43–75)
NRBC BLD AUTO-RTO: 0 /100 WBCS
PLATELET # BLD AUTO: 499 THOUSANDS/UL (ref 149–390)
PMV BLD AUTO: 8.6 FL (ref 8.9–12.7)
RBC # BLD AUTO: 3.45 MILLION/UL (ref 3.88–5.62)
WBC # BLD AUTO: 19.77 THOUSAND/UL (ref 4.31–10.16)

## 2020-10-30 PROCEDURE — 99232 SBSQ HOSP IP/OBS MODERATE 35: CPT | Performed by: INTERNAL MEDICINE

## 2020-10-30 PROCEDURE — 99024 POSTOP FOLLOW-UP VISIT: CPT | Performed by: PHYSICIAN ASSISTANT

## 2020-10-30 PROCEDURE — 85025 COMPLETE CBC W/AUTO DIFF WBC: CPT | Performed by: INTERNAL MEDICINE

## 2020-10-30 RX ADMIN — HEPARIN SODIUM 5000 UNITS: 5000 INJECTION INTRAVENOUS; SUBCUTANEOUS at 13:54

## 2020-10-30 RX ADMIN — MELATONIN TAB 3 MG 6 MG: 3 TAB at 21:24

## 2020-10-30 RX ADMIN — AMLODIPINE BESYLATE 10 MG: 5 TABLET ORAL at 09:09

## 2020-10-30 RX ADMIN — PRAVASTATIN SODIUM 20 MG: 20 TABLET ORAL at 17:03

## 2020-10-30 RX ADMIN — SODIUM CHLORIDE 50 ML/HR: 0.9 INJECTION, SOLUTION INTRAVENOUS at 09:12

## 2020-10-30 RX ADMIN — OXYCODONE HYDROCHLORIDE AND ACETAMINOPHEN 2 TABLET: 5; 325 TABLET ORAL at 13:54

## 2020-10-30 RX ADMIN — HEPARIN SODIUM 5000 UNITS: 5000 INJECTION INTRAVENOUS; SUBCUTANEOUS at 05:41

## 2020-10-30 RX ADMIN — LOSARTAN POTASSIUM 100 MG: 50 TABLET, FILM COATED ORAL at 09:09

## 2020-10-30 RX ADMIN — HEPARIN SODIUM 5000 UNITS: 5000 INJECTION INTRAVENOUS; SUBCUTANEOUS at 21:22

## 2020-10-30 RX ADMIN — POTASSIUM CHLORIDE 20 MEQ: 1500 TABLET, EXTENDED RELEASE ORAL at 17:03

## 2020-10-30 RX ADMIN — LEVOTHYROXINE SODIUM 75 MCG: 75 TABLET ORAL at 05:41

## 2020-10-30 RX ADMIN — OXYCODONE HYDROCHLORIDE AND ACETAMINOPHEN 2 TABLET: 5; 325 TABLET ORAL at 21:25

## 2020-10-30 RX ADMIN — Medication 125 MG: at 12:46

## 2020-10-30 RX ADMIN — PANTOPRAZOLE SODIUM 40 MG: 40 TABLET, DELAYED RELEASE ORAL at 05:41

## 2020-10-30 RX ADMIN — Medication 1000 MG: at 16:14

## 2020-10-30 RX ADMIN — POTASSIUM CHLORIDE 20 MEQ: 1500 TABLET, EXTENDED RELEASE ORAL at 09:09

## 2020-10-30 RX ADMIN — Medication 125 MG: at 05:41

## 2020-10-30 RX ADMIN — Medication 125 MG: at 00:41

## 2020-10-30 RX ADMIN — OXYCODONE HYDROCHLORIDE AND ACETAMINOPHEN 2 TABLET: 5; 325 TABLET ORAL at 06:13

## 2020-10-31 LAB
BASOPHILS # BLD AUTO: 0.04 THOUSANDS/ΜL (ref 0–0.1)
BASOPHILS NFR BLD AUTO: 0 % (ref 0–1)
EOSINOPHIL # BLD AUTO: 0.32 THOUSAND/ΜL (ref 0–0.61)
EOSINOPHIL NFR BLD AUTO: 2 % (ref 0–6)
ERYTHROCYTE [DISTWIDTH] IN BLOOD BY AUTOMATED COUNT: 14.3 % (ref 11.6–15.1)
HCT VFR BLD AUTO: 32.6 % (ref 36.5–49.3)
HGB BLD-MCNC: 10.4 G/DL (ref 12–17)
IMM GRANULOCYTES # BLD AUTO: 0.12 THOUSAND/UL (ref 0–0.2)
IMM GRANULOCYTES NFR BLD AUTO: 1 % (ref 0–2)
LYMPHOCYTES # BLD AUTO: 2.47 THOUSANDS/ΜL (ref 0.6–4.47)
LYMPHOCYTES NFR BLD AUTO: 16 % (ref 14–44)
MCH RBC QN AUTO: 29.8 PG (ref 26.8–34.3)
MCHC RBC AUTO-ENTMCNC: 31.9 G/DL (ref 31.4–37.4)
MCV RBC AUTO: 93 FL (ref 82–98)
MONOCYTES # BLD AUTO: 1.42 THOUSAND/ΜL (ref 0.17–1.22)
MONOCYTES NFR BLD AUTO: 9 % (ref 4–12)
NEUTROPHILS # BLD AUTO: 11.49 THOUSANDS/ΜL (ref 1.85–7.62)
NEUTS SEG NFR BLD AUTO: 72 % (ref 43–75)
PLATELET # BLD AUTO: 530 THOUSANDS/UL (ref 149–390)
PMV BLD AUTO: 8.1 FL (ref 8.9–12.7)
RBC # BLD AUTO: 3.49 MILLION/UL (ref 3.88–5.62)
WBC # BLD AUTO: 15.86 THOUSAND/UL (ref 4.31–10.16)

## 2020-10-31 PROCEDURE — 99024 POSTOP FOLLOW-UP VISIT: CPT | Performed by: SURGERY

## 2020-10-31 PROCEDURE — 85025 COMPLETE CBC W/AUTO DIFF WBC: CPT | Performed by: INTERNAL MEDICINE

## 2020-10-31 PROCEDURE — 99232 SBSQ HOSP IP/OBS MODERATE 35: CPT | Performed by: INTERNAL MEDICINE

## 2020-10-31 RX ADMIN — POTASSIUM CHLORIDE 20 MEQ: 1500 TABLET, EXTENDED RELEASE ORAL at 08:00

## 2020-10-31 RX ADMIN — AMLODIPINE BESYLATE 10 MG: 5 TABLET ORAL at 08:00

## 2020-10-31 RX ADMIN — HEPARIN SODIUM 5000 UNITS: 5000 INJECTION INTRAVENOUS; SUBCUTANEOUS at 14:06

## 2020-10-31 RX ADMIN — Medication 1000 MG: at 14:49

## 2020-10-31 RX ADMIN — OXYCODONE HYDROCHLORIDE AND ACETAMINOPHEN 2 TABLET: 5; 325 TABLET ORAL at 21:54

## 2020-10-31 RX ADMIN — LEVOTHYROXINE SODIUM 75 MCG: 75 TABLET ORAL at 05:21

## 2020-10-31 RX ADMIN — POTASSIUM CHLORIDE 20 MEQ: 1500 TABLET, EXTENDED RELEASE ORAL at 17:00

## 2020-10-31 RX ADMIN — LOSARTAN POTASSIUM 100 MG: 50 TABLET, FILM COATED ORAL at 08:00

## 2020-10-31 RX ADMIN — MELATONIN TAB 3 MG 6 MG: 3 TAB at 21:53

## 2020-10-31 RX ADMIN — OXYCODONE HYDROCHLORIDE AND ACETAMINOPHEN 2 TABLET: 5; 325 TABLET ORAL at 14:06

## 2020-10-31 RX ADMIN — PANTOPRAZOLE SODIUM 40 MG: 40 TABLET, DELAYED RELEASE ORAL at 05:23

## 2020-10-31 RX ADMIN — HEPARIN SODIUM 5000 UNITS: 5000 INJECTION INTRAVENOUS; SUBCUTANEOUS at 21:56

## 2020-10-31 RX ADMIN — HEPARIN SODIUM 5000 UNITS: 5000 INJECTION INTRAVENOUS; SUBCUTANEOUS at 05:21

## 2020-10-31 RX ADMIN — SODIUM CHLORIDE 50 ML/HR: 0.9 INJECTION, SOLUTION INTRAVENOUS at 05:18

## 2020-10-31 RX ADMIN — PRAVASTATIN SODIUM 20 MG: 20 TABLET ORAL at 17:00

## 2020-10-31 RX ADMIN — OXYCODONE HYDROCHLORIDE AND ACETAMINOPHEN 2 TABLET: 5; 325 TABLET ORAL at 07:59

## 2020-11-01 PROCEDURE — 99232 SBSQ HOSP IP/OBS MODERATE 35: CPT | Performed by: INTERNAL MEDICINE

## 2020-11-01 PROCEDURE — 99024 POSTOP FOLLOW-UP VISIT: CPT | Performed by: SURGERY

## 2020-11-01 RX ORDER — LORAZEPAM 0.5 MG/1
0.5 TABLET ORAL EVERY 8 HOURS PRN
Status: DISCONTINUED | OUTPATIENT
Start: 2020-11-01 | End: 2020-11-07 | Stop reason: HOSPADM

## 2020-11-01 RX ADMIN — POTASSIUM CHLORIDE 20 MEQ: 1500 TABLET, EXTENDED RELEASE ORAL at 17:15

## 2020-11-01 RX ADMIN — OXYCODONE HYDROCHLORIDE AND ACETAMINOPHEN 1 TABLET: 5; 325 TABLET ORAL at 06:38

## 2020-11-01 RX ADMIN — OXYCODONE HYDROCHLORIDE AND ACETAMINOPHEN 2 TABLET: 5; 325 TABLET ORAL at 14:01

## 2020-11-01 RX ADMIN — Medication 1000 MG: at 14:52

## 2020-11-01 RX ADMIN — SODIUM CHLORIDE 50 ML/HR: 0.9 INJECTION, SOLUTION INTRAVENOUS at 20:14

## 2020-11-01 RX ADMIN — HEPARIN SODIUM 5000 UNITS: 5000 INJECTION INTRAVENOUS; SUBCUTANEOUS at 21:50

## 2020-11-01 RX ADMIN — POTASSIUM CHLORIDE 20 MEQ: 1500 TABLET, EXTENDED RELEASE ORAL at 08:18

## 2020-11-01 RX ADMIN — MELATONIN TAB 3 MG 6 MG: 3 TAB at 21:50

## 2020-11-01 RX ADMIN — OXYCODONE HYDROCHLORIDE AND ACETAMINOPHEN 1 TABLET: 5; 325 TABLET ORAL at 07:27

## 2020-11-01 RX ADMIN — SODIUM CHLORIDE 50 ML/HR: 0.9 INJECTION, SOLUTION INTRAVENOUS at 01:23

## 2020-11-01 RX ADMIN — OXYCODONE HYDROCHLORIDE AND ACETAMINOPHEN 2 TABLET: 5; 325 TABLET ORAL at 21:51

## 2020-11-01 RX ADMIN — LORAZEPAM 0.5 MG: 0.5 TABLET ORAL at 20:29

## 2020-11-01 RX ADMIN — LOSARTAN POTASSIUM 100 MG: 50 TABLET, FILM COATED ORAL at 08:17

## 2020-11-01 RX ADMIN — AMLODIPINE BESYLATE 10 MG: 5 TABLET ORAL at 08:17

## 2020-11-01 RX ADMIN — PRAVASTATIN SODIUM 20 MG: 20 TABLET ORAL at 17:15

## 2020-11-01 RX ADMIN — LEVOTHYROXINE SODIUM 75 MCG: 75 TABLET ORAL at 06:29

## 2020-11-01 RX ADMIN — PANTOPRAZOLE SODIUM 40 MG: 40 TABLET, DELAYED RELEASE ORAL at 06:28

## 2020-11-01 RX ADMIN — HEPARIN SODIUM 5000 UNITS: 5000 INJECTION INTRAVENOUS; SUBCUTANEOUS at 13:57

## 2020-11-01 RX ADMIN — HEPARIN SODIUM 5000 UNITS: 5000 INJECTION INTRAVENOUS; SUBCUTANEOUS at 06:29

## 2020-11-02 ENCOUNTER — ANESTHESIA (INPATIENT)
Dept: PERIOP | Facility: HOSPITAL | Age: 50
DRG: 853 | End: 2020-11-02
Payer: COMMERCIAL

## 2020-11-02 ENCOUNTER — ANESTHESIA EVENT (INPATIENT)
Dept: PERIOP | Facility: HOSPITAL | Age: 50
DRG: 853 | End: 2020-11-02
Payer: COMMERCIAL

## 2020-11-02 VITALS — HEART RATE: 108 BPM

## 2020-11-02 LAB
ANION GAP SERPL CALCULATED.3IONS-SCNC: 5 MMOL/L (ref 4–13)
BASOPHILS # BLD MANUAL: 0 THOUSAND/UL (ref 0–0.1)
BASOPHILS NFR MAR MANUAL: 0 % (ref 0–1)
BUN SERPL-MCNC: 10 MG/DL (ref 5–25)
CALCIUM SERPL-MCNC: 8.2 MG/DL (ref 8.3–10.1)
CHLORIDE SERPL-SCNC: 100 MMOL/L (ref 100–108)
CO2 SERPL-SCNC: 27 MMOL/L (ref 21–32)
CREAT SERPL-MCNC: 0.68 MG/DL (ref 0.6–1.3)
EOSINOPHIL # BLD MANUAL: 0.32 THOUSAND/UL (ref 0–0.4)
EOSINOPHIL NFR BLD MANUAL: 2 % (ref 0–6)
ERYTHROCYTE [DISTWIDTH] IN BLOOD BY AUTOMATED COUNT: 14.1 % (ref 11.6–15.1)
GFR SERPL CREATININE-BSD FRML MDRD: 111 ML/MIN/1.73SQ M
GLUCOSE SERPL-MCNC: 107 MG/DL (ref 65–140)
HCT VFR BLD AUTO: 33.2 % (ref 36.5–49.3)
HGB BLD-MCNC: 10.8 G/DL (ref 12–17)
LYMPHOCYTES # BLD AUTO: 1.59 THOUSAND/UL (ref 0.6–4.47)
LYMPHOCYTES # BLD AUTO: 10 % (ref 14–44)
MCH RBC QN AUTO: 29.8 PG (ref 26.8–34.3)
MCHC RBC AUTO-ENTMCNC: 32.5 G/DL (ref 31.4–37.4)
MCV RBC AUTO: 92 FL (ref 82–98)
MONOCYTES # BLD AUTO: 0.8 THOUSAND/UL (ref 0–1.22)
MONOCYTES NFR BLD: 5 % (ref 4–12)
NEUTROPHILS # BLD MANUAL: 13.2 THOUSAND/UL (ref 1.85–7.62)
NEUTS SEG NFR BLD AUTO: 83 % (ref 43–75)
PLATELET # BLD AUTO: 527 THOUSANDS/UL (ref 149–390)
PLATELET BLD QL SMEAR: ABNORMAL
PMV BLD AUTO: 8.4 FL (ref 8.9–12.7)
POTASSIUM SERPL-SCNC: 4.2 MMOL/L (ref 3.5–5.3)
RBC # BLD AUTO: 3.63 MILLION/UL (ref 3.88–5.62)
SODIUM SERPL-SCNC: 132 MMOL/L (ref 136–145)
TOTAL CELLS COUNTED SPEC: 100
WBC # BLD AUTO: 15.9 THOUSAND/UL (ref 4.31–10.16)

## 2020-11-02 PROCEDURE — 99232 SBSQ HOSP IP/OBS MODERATE 35: CPT | Performed by: INTERNAL MEDICINE

## 2020-11-02 PROCEDURE — 99024 POSTOP FOLLOW-UP VISIT: CPT | Performed by: SURGERY

## 2020-11-02 PROCEDURE — 85027 COMPLETE CBC AUTOMATED: CPT | Performed by: PHYSICIAN ASSISTANT

## 2020-11-02 PROCEDURE — 0D1K0Z4 BYPASS ASCENDING COLON TO CUTANEOUS, OPEN APPROACH: ICD-10-PCS | Performed by: SURGERY

## 2020-11-02 PROCEDURE — 44188 LAP COLOSTOMY: CPT | Performed by: SURGERY

## 2020-11-02 PROCEDURE — NC001 PR NO CHARGE: Performed by: SURGERY

## 2020-11-02 PROCEDURE — 85007 BL SMEAR W/DIFF WBC COUNT: CPT | Performed by: PHYSICIAN ASSISTANT

## 2020-11-02 PROCEDURE — 80048 BASIC METABOLIC PNL TOTAL CA: CPT | Performed by: PHYSICIAN ASSISTANT

## 2020-11-02 PROCEDURE — 0DJD4ZZ INSPECTION OF LOWER INTESTINAL TRACT, PERCUTANEOUS ENDOSCOPIC APPROACH: ICD-10-PCS | Performed by: SURGERY

## 2020-11-02 RX ORDER — HYDROMORPHONE HCL/PF 1 MG/ML
0.5 SYRINGE (ML) INJECTION
Status: DISCONTINUED | OUTPATIENT
Start: 2020-11-02 | End: 2020-11-02 | Stop reason: HOSPADM

## 2020-11-02 RX ORDER — ONDANSETRON 2 MG/ML
INJECTION INTRAMUSCULAR; INTRAVENOUS AS NEEDED
Status: DISCONTINUED | OUTPATIENT
Start: 2020-11-02 | End: 2020-11-02

## 2020-11-02 RX ORDER — ACETAMINOPHEN 325 MG/1
975 TABLET ORAL EVERY 6 HOURS SCHEDULED
Status: DISCONTINUED | OUTPATIENT
Start: 2020-11-02 | End: 2020-11-07 | Stop reason: HOSPADM

## 2020-11-02 RX ORDER — ROCURONIUM BROMIDE 10 MG/ML
INJECTION, SOLUTION INTRAVENOUS AS NEEDED
Status: DISCONTINUED | OUTPATIENT
Start: 2020-11-02 | End: 2020-11-02

## 2020-11-02 RX ORDER — HYDROMORPHONE HCL/PF 1 MG/ML
1 SYRINGE (ML) INJECTION EVERY 2 HOUR PRN
Status: DISCONTINUED | OUTPATIENT
Start: 2020-11-02 | End: 2020-11-07 | Stop reason: HOSPADM

## 2020-11-02 RX ORDER — NEOSTIGMINE METHYLSULFATE 1 MG/ML
INJECTION INTRAVENOUS AS NEEDED
Status: DISCONTINUED | OUTPATIENT
Start: 2020-11-02 | End: 2020-11-02

## 2020-11-02 RX ORDER — SODIUM CHLORIDE, SODIUM LACTATE, POTASSIUM CHLORIDE, CALCIUM CHLORIDE 600; 310; 30; 20 MG/100ML; MG/100ML; MG/100ML; MG/100ML
INJECTION, SOLUTION INTRAVENOUS CONTINUOUS PRN
Status: DISCONTINUED | OUTPATIENT
Start: 2020-11-02 | End: 2020-11-02

## 2020-11-02 RX ORDER — SODIUM CHLORIDE 9 MG/ML
125 INJECTION, SOLUTION INTRAVENOUS CONTINUOUS
Status: DISCONTINUED | OUTPATIENT
Start: 2020-11-02 | End: 2020-11-04

## 2020-11-02 RX ORDER — FENTANYL CITRATE/PF 50 MCG/ML
25 SYRINGE (ML) INJECTION
Status: DISCONTINUED | OUTPATIENT
Start: 2020-11-02 | End: 2020-11-02 | Stop reason: HOSPADM

## 2020-11-02 RX ORDER — PROPOFOL 10 MG/ML
INJECTION, EMULSION INTRAVENOUS AS NEEDED
Status: DISCONTINUED | OUTPATIENT
Start: 2020-11-02 | End: 2020-11-02

## 2020-11-02 RX ORDER — FENTANYL CITRATE 50 UG/ML
INJECTION, SOLUTION INTRAMUSCULAR; INTRAVENOUS AS NEEDED
Status: DISCONTINUED | OUTPATIENT
Start: 2020-11-02 | End: 2020-11-02

## 2020-11-02 RX ORDER — OXYCODONE HYDROCHLORIDE 5 MG/1
10 TABLET ORAL EVERY 4 HOURS PRN
Status: DISCONTINUED | OUTPATIENT
Start: 2020-11-02 | End: 2020-11-07 | Stop reason: HOSPADM

## 2020-11-02 RX ORDER — MIDAZOLAM HYDROCHLORIDE 2 MG/2ML
INJECTION, SOLUTION INTRAMUSCULAR; INTRAVENOUS AS NEEDED
Status: DISCONTINUED | OUTPATIENT
Start: 2020-11-02 | End: 2020-11-02

## 2020-11-02 RX ORDER — GLYCOPYRROLATE 0.2 MG/ML
INJECTION INTRAMUSCULAR; INTRAVENOUS AS NEEDED
Status: DISCONTINUED | OUTPATIENT
Start: 2020-11-02 | End: 2020-11-02

## 2020-11-02 RX ORDER — OXYCODONE HYDROCHLORIDE 5 MG/1
5 TABLET ORAL EVERY 4 HOURS PRN
Status: DISCONTINUED | OUTPATIENT
Start: 2020-11-02 | End: 2020-11-07 | Stop reason: HOSPADM

## 2020-11-02 RX ADMIN — SODIUM CHLORIDE, SODIUM LACTATE, POTASSIUM CHLORIDE, AND CALCIUM CHLORIDE: .6; .31; .03; .02 INJECTION, SOLUTION INTRAVENOUS at 12:00

## 2020-11-02 RX ADMIN — HYDROMORPHONE HYDROCHLORIDE 1 MG: 1 INJECTION, SOLUTION INTRAMUSCULAR; INTRAVENOUS; SUBCUTANEOUS at 23:04

## 2020-11-02 RX ADMIN — HYDROMORPHONE HYDROCHLORIDE 0.5 MG: 1 INJECTION, SOLUTION INTRAMUSCULAR; INTRAVENOUS; SUBCUTANEOUS at 15:09

## 2020-11-02 RX ADMIN — HYDROMORPHONE HYDROCHLORIDE 0.5 MG: 1 INJECTION, SOLUTION INTRAMUSCULAR; INTRAVENOUS; SUBCUTANEOUS at 15:19

## 2020-11-02 RX ADMIN — ACETAMINOPHEN 975 MG: 325 TABLET ORAL at 17:42

## 2020-11-02 RX ADMIN — ROCURONIUM BROMIDE 10 MG: 10 INJECTION, SOLUTION INTRAVENOUS at 13:21

## 2020-11-02 RX ADMIN — ONDANSETRON 4 MG: 2 INJECTION INTRAMUSCULAR; INTRAVENOUS at 14:26

## 2020-11-02 RX ADMIN — FENTANYL CITRATE 50 MCG: 50 INJECTION, SOLUTION INTRAMUSCULAR; INTRAVENOUS at 12:30

## 2020-11-02 RX ADMIN — AMLODIPINE BESYLATE 10 MG: 5 TABLET ORAL at 08:28

## 2020-11-02 RX ADMIN — SODIUM CHLORIDE, SODIUM LACTATE, POTASSIUM CHLORIDE, AND CALCIUM CHLORIDE: .6; .31; .03; .02 INJECTION, SOLUTION INTRAVENOUS at 13:50

## 2020-11-02 RX ADMIN — OXYCODONE HYDROCHLORIDE 10 MG: 5 TABLET ORAL at 18:12

## 2020-11-02 RX ADMIN — FENTANYL CITRATE 100 MCG: 50 INJECTION, SOLUTION INTRAMUSCULAR; INTRAVENOUS at 12:09

## 2020-11-02 RX ADMIN — LEVOTHYROXINE SODIUM 75 MCG: 75 TABLET ORAL at 06:04

## 2020-11-02 RX ADMIN — HYDROMORPHONE HYDROCHLORIDE 1 MG: 1 INJECTION, SOLUTION INTRAMUSCULAR; INTRAVENOUS; SUBCUTANEOUS at 16:30

## 2020-11-02 RX ADMIN — LOSARTAN POTASSIUM 100 MG: 50 TABLET, FILM COATED ORAL at 08:28

## 2020-11-02 RX ADMIN — PANTOPRAZOLE SODIUM 40 MG: 40 TABLET, DELAYED RELEASE ORAL at 06:04

## 2020-11-02 RX ADMIN — OXYCODONE HYDROCHLORIDE AND ACETAMINOPHEN 1 TABLET: 5; 325 TABLET ORAL at 06:09

## 2020-11-02 RX ADMIN — HYDROMORPHONE HYDROCHLORIDE 0.5 MG: 1 INJECTION, SOLUTION INTRAMUSCULAR; INTRAVENOUS; SUBCUTANEOUS at 15:31

## 2020-11-02 RX ADMIN — ACETAMINOPHEN 975 MG: 325 TABLET ORAL at 23:02

## 2020-11-02 RX ADMIN — HEPARIN SODIUM 5000 UNITS: 5000 INJECTION INTRAVENOUS; SUBCUTANEOUS at 23:04

## 2020-11-02 RX ADMIN — FENTANYL CITRATE 50 MCG: 50 INJECTION, SOLUTION INTRAMUSCULAR; INTRAVENOUS at 12:34

## 2020-11-02 RX ADMIN — GLYCOPYRROLATE 0.4 MG: 0.2 INJECTION, SOLUTION INTRAMUSCULAR; INTRAVENOUS at 14:16

## 2020-11-02 RX ADMIN — ROCURONIUM BROMIDE 50 MG: 10 INJECTION, SOLUTION INTRAVENOUS at 12:09

## 2020-11-02 RX ADMIN — MIDAZOLAM 2 MG: 1 INJECTION INTRAMUSCULAR; INTRAVENOUS at 12:09

## 2020-11-02 RX ADMIN — POTASSIUM CHLORIDE 20 MEQ: 1500 TABLET, EXTENDED RELEASE ORAL at 17:42

## 2020-11-02 RX ADMIN — MELATONIN TAB 3 MG 6 MG: 3 TAB at 23:02

## 2020-11-02 RX ADMIN — ROCURONIUM BROMIDE 10 MG: 10 INJECTION, SOLUTION INTRAVENOUS at 13:47

## 2020-11-02 RX ADMIN — ROCURONIUM BROMIDE 20 MG: 10 INJECTION, SOLUTION INTRAVENOUS at 12:38

## 2020-11-02 RX ADMIN — HYDROMORPHONE HYDROCHLORIDE 1 MG: 1 INJECTION, SOLUTION INTRAMUSCULAR; INTRAVENOUS; SUBCUTANEOUS at 20:53

## 2020-11-02 RX ADMIN — POTASSIUM CHLORIDE 20 MEQ: 1500 TABLET, EXTENDED RELEASE ORAL at 08:28

## 2020-11-02 RX ADMIN — NEOSTIGMINE METHYLSULFATE 3 MG: 1 INJECTION, SOLUTION INTRAVENOUS at 14:16

## 2020-11-02 RX ADMIN — PROPOFOL 200 MG: 10 INJECTION, EMULSION INTRAVENOUS at 12:09

## 2020-11-02 RX ADMIN — SODIUM CHLORIDE 100 ML/HR: 0.9 INJECTION, SOLUTION INTRAVENOUS at 16:26

## 2020-11-02 RX ADMIN — FENTANYL CITRATE 100 MCG: 50 INJECTION, SOLUTION INTRAMUSCULAR; INTRAVENOUS at 14:43

## 2020-11-02 RX ADMIN — HYDROMORPHONE HYDROCHLORIDE 0.5 MG: 1 INJECTION, SOLUTION INTRAMUSCULAR; INTRAVENOUS; SUBCUTANEOUS at 14:56

## 2020-11-02 RX ADMIN — PRAVASTATIN SODIUM 20 MG: 20 TABLET ORAL at 16:30

## 2020-11-03 LAB
ANION GAP SERPL CALCULATED.3IONS-SCNC: 7 MMOL/L (ref 4–13)
BACTERIA BLD CULT: NORMAL
BACTERIA BLD CULT: NORMAL
BASOPHILS # BLD AUTO: 0.06 THOUSANDS/ΜL (ref 0–0.1)
BASOPHILS NFR BLD AUTO: 0 % (ref 0–1)
BUN SERPL-MCNC: 12 MG/DL (ref 5–25)
CALCIUM SERPL-MCNC: 8.1 MG/DL (ref 8.3–10.1)
CHLORIDE SERPL-SCNC: 101 MMOL/L (ref 100–108)
CO2 SERPL-SCNC: 26 MMOL/L (ref 21–32)
CREAT SERPL-MCNC: 0.82 MG/DL (ref 0.6–1.3)
EOSINOPHIL # BLD AUTO: 0 THOUSAND/ΜL (ref 0–0.61)
EOSINOPHIL NFR BLD AUTO: 0 % (ref 0–6)
ERYTHROCYTE [DISTWIDTH] IN BLOOD BY AUTOMATED COUNT: 13.7 % (ref 11.6–15.1)
GFR SERPL CREATININE-BSD FRML MDRD: 102 ML/MIN/1.73SQ M
GLUCOSE SERPL-MCNC: 178 MG/DL (ref 65–140)
HCT VFR BLD AUTO: 38.2 % (ref 36.5–49.3)
HGB BLD-MCNC: 12.5 G/DL (ref 12–17)
IMM GRANULOCYTES # BLD AUTO: 0.21 THOUSAND/UL (ref 0–0.2)
IMM GRANULOCYTES NFR BLD AUTO: 1 % (ref 0–2)
LYMPHOCYTES # BLD AUTO: 1.32 THOUSANDS/ΜL (ref 0.6–4.47)
LYMPHOCYTES NFR BLD AUTO: 5 % (ref 14–44)
MAGNESIUM SERPL-MCNC: 1.5 MG/DL (ref 1.6–2.6)
MCH RBC QN AUTO: 30.3 PG (ref 26.8–34.3)
MCHC RBC AUTO-ENTMCNC: 32.7 G/DL (ref 31.4–37.4)
MCV RBC AUTO: 93 FL (ref 82–98)
MONOCYTES # BLD AUTO: 1.24 THOUSAND/ΜL (ref 0.17–1.22)
MONOCYTES NFR BLD AUTO: 5 % (ref 4–12)
NEUTROPHILS # BLD AUTO: 24.15 THOUSANDS/ΜL (ref 1.85–7.62)
NEUTS SEG NFR BLD AUTO: 89 % (ref 43–75)
NRBC BLD AUTO-RTO: 0 /100 WBCS
PHOSPHATE SERPL-MCNC: 4.3 MG/DL (ref 2.7–4.5)
PLATELET # BLD AUTO: 548 THOUSANDS/UL (ref 149–390)
PMV BLD AUTO: 8.4 FL (ref 8.9–12.7)
POTASSIUM SERPL-SCNC: 4.8 MMOL/L (ref 3.5–5.3)
RBC # BLD AUTO: 4.13 MILLION/UL (ref 3.88–5.62)
SODIUM SERPL-SCNC: 134 MMOL/L (ref 136–145)
WBC # BLD AUTO: 26.98 THOUSAND/UL (ref 4.31–10.16)

## 2020-11-03 PROCEDURE — 80048 BASIC METABOLIC PNL TOTAL CA: CPT | Performed by: NURSE PRACTITIONER

## 2020-11-03 PROCEDURE — 99232 SBSQ HOSP IP/OBS MODERATE 35: CPT | Performed by: INTERNAL MEDICINE

## 2020-11-03 PROCEDURE — 93005 ELECTROCARDIOGRAM TRACING: CPT

## 2020-11-03 PROCEDURE — 83735 ASSAY OF MAGNESIUM: CPT | Performed by: NURSE PRACTITIONER

## 2020-11-03 PROCEDURE — 85025 COMPLETE CBC W/AUTO DIFF WBC: CPT | Performed by: NURSE PRACTITIONER

## 2020-11-03 PROCEDURE — 84100 ASSAY OF PHOSPHORUS: CPT | Performed by: NURSE PRACTITIONER

## 2020-11-03 PROCEDURE — 99024 POSTOP FOLLOW-UP VISIT: CPT | Performed by: SURGERY

## 2020-11-03 RX ORDER — MAGNESIUM GLUCONATE 27 MG(500)
500 TABLET ORAL DAILY
Status: DISCONTINUED | OUTPATIENT
Start: 2020-11-03 | End: 2020-11-03

## 2020-11-03 RX ORDER — MAGNESIUM SULFATE HEPTAHYDRATE 40 MG/ML
2 INJECTION, SOLUTION INTRAVENOUS ONCE
Status: COMPLETED | OUTPATIENT
Start: 2020-11-03 | End: 2020-11-04

## 2020-11-03 RX ORDER — ALBUMIN, HUMAN INJ 5% 5 %
12.5 SOLUTION INTRAVENOUS ONCE
Status: COMPLETED | OUTPATIENT
Start: 2020-11-03 | End: 2020-11-03

## 2020-11-03 RX ADMIN — HYDROMORPHONE HYDROCHLORIDE 1 MG: 1 INJECTION, SOLUTION INTRAMUSCULAR; INTRAVENOUS; SUBCUTANEOUS at 18:20

## 2020-11-03 RX ADMIN — HYDROMORPHONE HYDROCHLORIDE 1 MG: 1 INJECTION, SOLUTION INTRAMUSCULAR; INTRAVENOUS; SUBCUTANEOUS at 08:12

## 2020-11-03 RX ADMIN — HYDROMORPHONE HYDROCHLORIDE 1 MG: 1 INJECTION, SOLUTION INTRAMUSCULAR; INTRAVENOUS; SUBCUTANEOUS at 05:50

## 2020-11-03 RX ADMIN — LEVOTHYROXINE SODIUM 75 MCG: 75 TABLET ORAL at 05:23

## 2020-11-03 RX ADMIN — OXYCODONE HYDROCHLORIDE 10 MG: 5 TABLET ORAL at 20:45

## 2020-11-03 RX ADMIN — POTASSIUM CHLORIDE 20 MEQ: 1500 TABLET, EXTENDED RELEASE ORAL at 08:12

## 2020-11-03 RX ADMIN — HYDROMORPHONE HYDROCHLORIDE 1 MG: 1 INJECTION, SOLUTION INTRAMUSCULAR; INTRAVENOUS; SUBCUTANEOUS at 23:29

## 2020-11-03 RX ADMIN — MAGNESIUM SULFATE IN WATER 2 G: 40 INJECTION, SOLUTION INTRAVENOUS at 11:01

## 2020-11-03 RX ADMIN — HEPARIN SODIUM 5000 UNITS: 5000 INJECTION INTRAVENOUS; SUBCUTANEOUS at 21:00

## 2020-11-03 RX ADMIN — LOSARTAN POTASSIUM 100 MG: 50 TABLET, FILM COATED ORAL at 08:12

## 2020-11-03 RX ADMIN — ALBUMIN (HUMAN) 12.5 G: 12.5 INJECTION, SOLUTION INTRAVENOUS at 03:19

## 2020-11-03 RX ADMIN — HYDROMORPHONE HYDROCHLORIDE 1 MG: 1 INJECTION, SOLUTION INTRAMUSCULAR; INTRAVENOUS; SUBCUTANEOUS at 02:07

## 2020-11-03 RX ADMIN — HEPARIN SODIUM 5000 UNITS: 5000 INJECTION INTRAVENOUS; SUBCUTANEOUS at 15:49

## 2020-11-03 RX ADMIN — ACETAMINOPHEN 975 MG: 325 TABLET ORAL at 05:23

## 2020-11-03 RX ADMIN — Medication: at 20:45

## 2020-11-03 RX ADMIN — ACETAMINOPHEN 975 MG: 325 TABLET ORAL at 11:02

## 2020-11-03 RX ADMIN — Medication 1000 MG: at 15:49

## 2020-11-03 RX ADMIN — HEPARIN SODIUM 5000 UNITS: 5000 INJECTION INTRAVENOUS; SUBCUTANEOUS at 05:24

## 2020-11-03 RX ADMIN — OXYCODONE HYDROCHLORIDE 10 MG: 5 TABLET ORAL at 15:50

## 2020-11-03 RX ADMIN — MELATONIN TAB 3 MG 6 MG: 3 TAB at 21:00

## 2020-11-03 RX ADMIN — PRAVASTATIN SODIUM 20 MG: 20 TABLET ORAL at 15:50

## 2020-11-03 RX ADMIN — PANTOPRAZOLE SODIUM 40 MG: 40 TABLET, DELAYED RELEASE ORAL at 05:23

## 2020-11-03 RX ADMIN — AMLODIPINE BESYLATE 10 MG: 5 TABLET ORAL at 08:12

## 2020-11-03 RX ADMIN — SODIUM CHLORIDE 100 ML/HR: 0.9 INJECTION, SOLUTION INTRAVENOUS at 02:10

## 2020-11-03 RX ADMIN — OXYCODONE HYDROCHLORIDE 10 MG: 5 TABLET ORAL at 11:02

## 2020-11-03 RX ADMIN — SODIUM CHLORIDE 125 ML/HR: 0.9 INJECTION, SOLUTION INTRAVENOUS at 23:31

## 2020-11-04 LAB
ANION GAP SERPL CALCULATED.3IONS-SCNC: 5 MMOL/L (ref 4–13)
ATRIAL RATE: 125 BPM
BASOPHILS # BLD AUTO: 0.03 THOUSANDS/ΜL (ref 0–0.1)
BASOPHILS NFR BLD AUTO: 0 % (ref 0–1)
BUN SERPL-MCNC: 8 MG/DL (ref 5–25)
CALCIUM SERPL-MCNC: 7.5 MG/DL (ref 8.3–10.1)
CHLORIDE SERPL-SCNC: 100 MMOL/L (ref 100–108)
CO2 SERPL-SCNC: 26 MMOL/L (ref 21–32)
CREAT SERPL-MCNC: 0.62 MG/DL (ref 0.6–1.3)
EOSINOPHIL # BLD AUTO: 0.09 THOUSAND/ΜL (ref 0–0.61)
EOSINOPHIL NFR BLD AUTO: 0 % (ref 0–6)
ERYTHROCYTE [DISTWIDTH] IN BLOOD BY AUTOMATED COUNT: 14 % (ref 11.6–15.1)
GFR SERPL CREATININE-BSD FRML MDRD: 115 ML/MIN/1.73SQ M
GLUCOSE SERPL-MCNC: 160 MG/DL (ref 65–140)
HCT VFR BLD AUTO: 29.1 % (ref 36.5–49.3)
HGB BLD-MCNC: 9.6 G/DL (ref 12–17)
IMM GRANULOCYTES # BLD AUTO: 0.22 THOUSAND/UL (ref 0–0.2)
IMM GRANULOCYTES NFR BLD AUTO: 1 % (ref 0–2)
LYMPHOCYTES # BLD AUTO: 1.34 THOUSANDS/ΜL (ref 0.6–4.47)
LYMPHOCYTES NFR BLD AUTO: 6 % (ref 14–44)
MAGNESIUM SERPL-MCNC: 1.8 MG/DL (ref 1.6–2.6)
MCH RBC QN AUTO: 30.5 PG (ref 26.8–34.3)
MCHC RBC AUTO-ENTMCNC: 33 G/DL (ref 31.4–37.4)
MCV RBC AUTO: 92 FL (ref 82–98)
MONOCYTES # BLD AUTO: 1.69 THOUSAND/ΜL (ref 0.17–1.22)
MONOCYTES NFR BLD AUTO: 8 % (ref 4–12)
NEUTROPHILS # BLD AUTO: 18.47 THOUSANDS/ΜL (ref 1.85–7.62)
NEUTS SEG NFR BLD AUTO: 85 % (ref 43–75)
NRBC BLD AUTO-RTO: 0 /100 WBCS
P AXIS: 42 DEGREES
PHOSPHATE SERPL-MCNC: 2.3 MG/DL (ref 2.7–4.5)
PLATELET # BLD AUTO: 440 THOUSANDS/UL (ref 149–390)
PMV BLD AUTO: 8.4 FL (ref 8.9–12.7)
POTASSIUM SERPL-SCNC: 4.4 MMOL/L (ref 3.5–5.3)
PR INTERVAL: 148 MS
QRS AXIS: -46 DEGREES
QRSD INTERVAL: 96 MS
QT INTERVAL: 312 MS
QTC INTERVAL: 450 MS
RBC # BLD AUTO: 3.15 MILLION/UL (ref 3.88–5.62)
SODIUM SERPL-SCNC: 131 MMOL/L (ref 136–145)
T WAVE AXIS: 24 DEGREES
VENTRICULAR RATE: 125 BPM
WBC # BLD AUTO: 21.84 THOUSAND/UL (ref 4.31–10.16)

## 2020-11-04 PROCEDURE — 99024 POSTOP FOLLOW-UP VISIT: CPT | Performed by: SURGERY

## 2020-11-04 PROCEDURE — 93010 ELECTROCARDIOGRAM REPORT: CPT | Performed by: INTERNAL MEDICINE

## 2020-11-04 PROCEDURE — 85025 COMPLETE CBC W/AUTO DIFF WBC: CPT | Performed by: SURGERY

## 2020-11-04 PROCEDURE — 80048 BASIC METABOLIC PNL TOTAL CA: CPT | Performed by: PHYSICIAN ASSISTANT

## 2020-11-04 PROCEDURE — 84100 ASSAY OF PHOSPHORUS: CPT | Performed by: SURGERY

## 2020-11-04 PROCEDURE — 84100 ASSAY OF PHOSPHORUS: CPT | Performed by: PHYSICIAN ASSISTANT

## 2020-11-04 PROCEDURE — 99232 SBSQ HOSP IP/OBS MODERATE 35: CPT | Performed by: INTERNAL MEDICINE

## 2020-11-04 PROCEDURE — 83735 ASSAY OF MAGNESIUM: CPT | Performed by: PHYSICIAN ASSISTANT

## 2020-11-04 RX ORDER — SODIUM CHLORIDE 9 MG/ML
37 INJECTION, SOLUTION INTRAVENOUS CONTINUOUS
Status: DISCONTINUED | OUTPATIENT
Start: 2020-11-04 | End: 2020-11-06

## 2020-11-04 RX ORDER — CHLORPROMAZINE HYDROCHLORIDE 10 MG/1
10 TABLET, FILM COATED ORAL EVERY 6 HOURS PRN
Status: DISCONTINUED | OUTPATIENT
Start: 2020-11-04 | End: 2020-11-07 | Stop reason: HOSPADM

## 2020-11-04 RX ORDER — FAMOTIDINE 20 MG/1
20 TABLET, FILM COATED ORAL 2 TIMES DAILY
Status: DISCONTINUED | OUTPATIENT
Start: 2020-11-04 | End: 2020-11-07 | Stop reason: HOSPADM

## 2020-11-04 RX ORDER — ONDANSETRON 2 MG/ML
4 INJECTION INTRAMUSCULAR; INTRAVENOUS EVERY 4 HOURS PRN
Status: DISCONTINUED | OUTPATIENT
Start: 2020-11-04 | End: 2020-11-07 | Stop reason: HOSPADM

## 2020-11-04 RX ADMIN — LOSARTAN POTASSIUM 100 MG: 50 TABLET, FILM COATED ORAL at 09:21

## 2020-11-04 RX ADMIN — HEPARIN SODIUM 5000 UNITS: 5000 INJECTION INTRAVENOUS; SUBCUTANEOUS at 05:00

## 2020-11-04 RX ADMIN — FAMOTIDINE 20 MG: 20 TABLET, FILM COATED ORAL at 13:38

## 2020-11-04 RX ADMIN — SODIUM CHLORIDE 37 ML/HR: 0.9 INJECTION, SOLUTION INTRAVENOUS at 09:23

## 2020-11-04 RX ADMIN — Medication 1000 MG: at 15:41

## 2020-11-04 RX ADMIN — PRAVASTATIN SODIUM 20 MG: 20 TABLET ORAL at 17:53

## 2020-11-04 RX ADMIN — HYDROMORPHONE HYDROCHLORIDE 1 MG: 1 INJECTION, SOLUTION INTRAMUSCULAR; INTRAVENOUS; SUBCUTANEOUS at 17:53

## 2020-11-04 RX ADMIN — MELATONIN TAB 3 MG 6 MG: 3 TAB at 21:16

## 2020-11-04 RX ADMIN — HEPARIN SODIUM 5000 UNITS: 5000 INJECTION INTRAVENOUS; SUBCUTANEOUS at 21:16

## 2020-11-04 RX ADMIN — OXYCODONE HYDROCHLORIDE 10 MG: 5 TABLET ORAL at 06:15

## 2020-11-04 RX ADMIN — HYDROMORPHONE HYDROCHLORIDE 1 MG: 1 INJECTION, SOLUTION INTRAMUSCULAR; INTRAVENOUS; SUBCUTANEOUS at 09:23

## 2020-11-04 RX ADMIN — OXYCODONE HYDROCHLORIDE 10 MG: 5 TABLET ORAL at 19:50

## 2020-11-04 RX ADMIN — AMLODIPINE BESYLATE 10 MG: 5 TABLET ORAL at 09:21

## 2020-11-04 RX ADMIN — LEVOTHYROXINE SODIUM 75 MCG: 75 TABLET ORAL at 05:00

## 2020-11-04 RX ADMIN — OXYCODONE HYDROCHLORIDE 10 MG: 5 TABLET ORAL at 02:11

## 2020-11-04 RX ADMIN — ONDANSETRON 4 MG: 2 INJECTION INTRAMUSCULAR; INTRAVENOUS at 23:58

## 2020-11-04 RX ADMIN — PANTOPRAZOLE SODIUM 40 MG: 40 TABLET, DELAYED RELEASE ORAL at 05:00

## 2020-11-04 RX ADMIN — HYDROMORPHONE HYDROCHLORIDE 1 MG: 1 INJECTION, SOLUTION INTRAMUSCULAR; INTRAVENOUS; SUBCUTANEOUS at 04:49

## 2020-11-04 RX ADMIN — HEPARIN SODIUM 5000 UNITS: 5000 INJECTION INTRAVENOUS; SUBCUTANEOUS at 13:38

## 2020-11-04 RX ADMIN — CHLORPROMAZINE HYDROCHLORIDE 10 MG: 10 TABLET, FILM COATED ORAL at 13:59

## 2020-11-04 RX ADMIN — HYDROMORPHONE HYDROCHLORIDE 1 MG: 1 INJECTION, SOLUTION INTRAMUSCULAR; INTRAVENOUS; SUBCUTANEOUS at 13:38

## 2020-11-04 RX ADMIN — OXYCODONE HYDROCHLORIDE 10 MG: 5 TABLET ORAL at 11:37

## 2020-11-04 RX ADMIN — ACETAMINOPHEN 975 MG: 325 TABLET ORAL at 05:00

## 2020-11-04 RX ADMIN — Medication: at 21:16

## 2020-11-04 RX ADMIN — OXYCODONE HYDROCHLORIDE 10 MG: 5 TABLET ORAL at 15:42

## 2020-11-05 PROBLEM — R00.0 SINUS TACHYCARDIA: Status: ACTIVE | Noted: 2020-11-05

## 2020-11-05 PROBLEM — E87.1 HYPONATREMIA: Status: ACTIVE | Noted: 2020-11-05

## 2020-11-05 LAB
ANION GAP SERPL CALCULATED.3IONS-SCNC: 8 MMOL/L (ref 4–13)
BASOPHILS # BLD AUTO: 0.05 THOUSANDS/ΜL (ref 0–0.1)
BASOPHILS NFR BLD AUTO: 0 % (ref 0–1)
BUN SERPL-MCNC: 11 MG/DL (ref 5–25)
CALCIUM SERPL-MCNC: 7.9 MG/DL (ref 8.3–10.1)
CHLORIDE SERPL-SCNC: 101 MMOL/L (ref 100–108)
CO2 SERPL-SCNC: 25 MMOL/L (ref 21–32)
CREAT SERPL-MCNC: 0.52 MG/DL (ref 0.6–1.3)
EOSINOPHIL # BLD AUTO: 0.14 THOUSAND/ΜL (ref 0–0.61)
EOSINOPHIL NFR BLD AUTO: 1 % (ref 0–6)
ERYTHROCYTE [DISTWIDTH] IN BLOOD BY AUTOMATED COUNT: 14.1 % (ref 11.6–15.1)
GFR SERPL CREATININE-BSD FRML MDRD: 123 ML/MIN/1.73SQ M
GLUCOSE SERPL-MCNC: 137 MG/DL (ref 65–140)
HCT VFR BLD AUTO: 28.7 % (ref 36.5–49.3)
HGB BLD-MCNC: 9.2 G/DL (ref 12–17)
IMM GRANULOCYTES # BLD AUTO: 0.15 THOUSAND/UL (ref 0–0.2)
IMM GRANULOCYTES NFR BLD AUTO: 1 % (ref 0–2)
LYMPHOCYTES # BLD AUTO: 2 THOUSANDS/ΜL (ref 0.6–4.47)
LYMPHOCYTES NFR BLD AUTO: 11 % (ref 14–44)
MAGNESIUM SERPL-MCNC: 1.8 MG/DL (ref 1.6–2.6)
MCH RBC QN AUTO: 29.5 PG (ref 26.8–34.3)
MCHC RBC AUTO-ENTMCNC: 32.1 G/DL (ref 31.4–37.4)
MCV RBC AUTO: 92 FL (ref 82–98)
MONOCYTES # BLD AUTO: 2.08 THOUSAND/ΜL (ref 0.17–1.22)
MONOCYTES NFR BLD AUTO: 11 % (ref 4–12)
NEUTROPHILS # BLD AUTO: 14.56 THOUSANDS/ΜL (ref 1.85–7.62)
NEUTS SEG NFR BLD AUTO: 76 % (ref 43–75)
NRBC BLD AUTO-RTO: 0 /100 WBCS
PHOSPHATE SERPL-MCNC: 2.2 MG/DL (ref 2.7–4.5)
PLATELET # BLD AUTO: 463 THOUSANDS/UL (ref 149–390)
PMV BLD AUTO: 8.5 FL (ref 8.9–12.7)
POTASSIUM SERPL-SCNC: 4.3 MMOL/L (ref 3.5–5.3)
RBC # BLD AUTO: 3.12 MILLION/UL (ref 3.88–5.62)
SODIUM SERPL-SCNC: 134 MMOL/L (ref 136–145)
WBC # BLD AUTO: 18.98 THOUSAND/UL (ref 4.31–10.16)

## 2020-11-05 PROCEDURE — 80048 BASIC METABOLIC PNL TOTAL CA: CPT | Performed by: PHYSICIAN ASSISTANT

## 2020-11-05 PROCEDURE — 99024 POSTOP FOLLOW-UP VISIT: CPT | Performed by: PHYSICIAN ASSISTANT

## 2020-11-05 PROCEDURE — 99232 SBSQ HOSP IP/OBS MODERATE 35: CPT | Performed by: INTERNAL MEDICINE

## 2020-11-05 PROCEDURE — 83735 ASSAY OF MAGNESIUM: CPT | Performed by: PHYSICIAN ASSISTANT

## 2020-11-05 PROCEDURE — 85025 COMPLETE CBC W/AUTO DIFF WBC: CPT | Performed by: PHYSICIAN ASSISTANT

## 2020-11-05 PROCEDURE — 84100 ASSAY OF PHOSPHORUS: CPT | Performed by: PHYSICIAN ASSISTANT

## 2020-11-05 RX ADMIN — HYDROMORPHONE HYDROCHLORIDE 1 MG: 1 INJECTION, SOLUTION INTRAMUSCULAR; INTRAVENOUS; SUBCUTANEOUS at 09:35

## 2020-11-05 RX ADMIN — OXYCODONE HYDROCHLORIDE 10 MG: 5 TABLET ORAL at 14:16

## 2020-11-05 RX ADMIN — HYDROMORPHONE HYDROCHLORIDE 1 MG: 1 INJECTION, SOLUTION INTRAMUSCULAR; INTRAVENOUS; SUBCUTANEOUS at 04:35

## 2020-11-05 RX ADMIN — HYDROMORPHONE HYDROCHLORIDE 1 MG: 1 INJECTION, SOLUTION INTRAMUSCULAR; INTRAVENOUS; SUBCUTANEOUS at 17:31

## 2020-11-05 RX ADMIN — MELATONIN TAB 3 MG 6 MG: 3 TAB at 21:37

## 2020-11-05 RX ADMIN — OXYCODONE HYDROCHLORIDE 10 MG: 5 TABLET ORAL at 20:39

## 2020-11-05 RX ADMIN — Medication: at 21:39

## 2020-11-05 RX ADMIN — AMLODIPINE BESYLATE 10 MG: 5 TABLET ORAL at 09:26

## 2020-11-05 RX ADMIN — HEPARIN SODIUM 5000 UNITS: 5000 INJECTION INTRAVENOUS; SUBCUTANEOUS at 04:45

## 2020-11-05 RX ADMIN — ACETAMINOPHEN 975 MG: 325 TABLET ORAL at 17:12

## 2020-11-05 RX ADMIN — LOSARTAN POTASSIUM 100 MG: 50 TABLET, FILM COATED ORAL at 09:25

## 2020-11-05 RX ADMIN — Medication 1000 MG: at 17:13

## 2020-11-05 RX ADMIN — POTASSIUM PHOSPHATE, MONOBASIC POTASSIUM PHOSPHATE, DIBASIC 12 MMOL: 224; 236 INJECTION, SOLUTION, CONCENTRATE INTRAVENOUS at 13:19

## 2020-11-05 RX ADMIN — ACETAMINOPHEN 975 MG: 325 TABLET ORAL at 11:55

## 2020-11-05 RX ADMIN — LEVOTHYROXINE SODIUM 75 MCG: 75 TABLET ORAL at 04:45

## 2020-11-05 RX ADMIN — PRAVASTATIN SODIUM 20 MG: 20 TABLET ORAL at 17:13

## 2020-11-05 RX ADMIN — HYDROMORPHONE HYDROCHLORIDE 1 MG: 1 INJECTION, SOLUTION INTRAMUSCULAR; INTRAVENOUS; SUBCUTANEOUS at 11:56

## 2020-11-05 RX ADMIN — SODIUM CHLORIDE 37 ML/HR: 0.9 INJECTION, SOLUTION INTRAVENOUS at 11:23

## 2020-11-05 RX ADMIN — HEPARIN SODIUM 5000 UNITS: 5000 INJECTION INTRAVENOUS; SUBCUTANEOUS at 14:18

## 2020-11-05 RX ADMIN — ACETAMINOPHEN 975 MG: 325 TABLET ORAL at 04:45

## 2020-11-05 RX ADMIN — HEPARIN SODIUM 5000 UNITS: 5000 INJECTION INTRAVENOUS; SUBCUTANEOUS at 21:37

## 2020-11-05 RX ADMIN — ONDANSETRON 4 MG: 2 INJECTION INTRAMUSCULAR; INTRAVENOUS at 04:34

## 2020-11-05 RX ADMIN — PANTOPRAZOLE SODIUM 40 MG: 40 TABLET, DELAYED RELEASE ORAL at 04:45

## 2020-11-06 ENCOUNTER — APPOINTMENT (INPATIENT)
Dept: CT IMAGING | Facility: HOSPITAL | Age: 50
DRG: 853 | End: 2020-11-06
Payer: COMMERCIAL

## 2020-11-06 LAB
ANION GAP SERPL CALCULATED.3IONS-SCNC: 7 MMOL/L (ref 4–13)
BASOPHILS # BLD AUTO: 0.05 THOUSANDS/ΜL (ref 0–0.1)
BASOPHILS NFR BLD AUTO: 0 % (ref 0–1)
BUN SERPL-MCNC: 10 MG/DL (ref 5–25)
CALCIUM SERPL-MCNC: 7.8 MG/DL (ref 8.3–10.1)
CHLORIDE SERPL-SCNC: 101 MMOL/L (ref 100–108)
CO2 SERPL-SCNC: 26 MMOL/L (ref 21–32)
CREAT SERPL-MCNC: 0.52 MG/DL (ref 0.6–1.3)
EOSINOPHIL # BLD AUTO: 0.31 THOUSAND/ΜL (ref 0–0.61)
EOSINOPHIL NFR BLD AUTO: 2 % (ref 0–6)
ERYTHROCYTE [DISTWIDTH] IN BLOOD BY AUTOMATED COUNT: 14.2 % (ref 11.6–15.1)
GFR SERPL CREATININE-BSD FRML MDRD: 123 ML/MIN/1.73SQ M
GLUCOSE SERPL-MCNC: 107 MG/DL (ref 65–140)
HCT VFR BLD AUTO: 28.1 % (ref 36.5–49.3)
HGB BLD-MCNC: 9.1 G/DL (ref 12–17)
IMM GRANULOCYTES # BLD AUTO: 0.13 THOUSAND/UL (ref 0–0.2)
IMM GRANULOCYTES NFR BLD AUTO: 1 % (ref 0–2)
LYMPHOCYTES # BLD AUTO: 1.76 THOUSANDS/ΜL (ref 0.6–4.47)
LYMPHOCYTES NFR BLD AUTO: 11 % (ref 14–44)
MAGNESIUM SERPL-MCNC: 1.8 MG/DL (ref 1.6–2.6)
MCH RBC QN AUTO: 29.6 PG (ref 26.8–34.3)
MCHC RBC AUTO-ENTMCNC: 32.4 G/DL (ref 31.4–37.4)
MCV RBC AUTO: 92 FL (ref 82–98)
MONOCYTES # BLD AUTO: 1.49 THOUSAND/ΜL (ref 0.17–1.22)
MONOCYTES NFR BLD AUTO: 10 % (ref 4–12)
NEUTROPHILS # BLD AUTO: 11.98 THOUSANDS/ΜL (ref 1.85–7.62)
NEUTS SEG NFR BLD AUTO: 76 % (ref 43–75)
NRBC BLD AUTO-RTO: 0 /100 WBCS
PHOSPHATE SERPL-MCNC: 3.5 MG/DL (ref 2.7–4.5)
PLATELET # BLD AUTO: 507 THOUSANDS/UL (ref 149–390)
PMV BLD AUTO: 8.5 FL (ref 8.9–12.7)
POTASSIUM SERPL-SCNC: 4.1 MMOL/L (ref 3.5–5.3)
RBC # BLD AUTO: 3.07 MILLION/UL (ref 3.88–5.62)
SODIUM SERPL-SCNC: 134 MMOL/L (ref 136–145)
WBC # BLD AUTO: 15.72 THOUSAND/UL (ref 4.31–10.16)

## 2020-11-06 PROCEDURE — 84100 ASSAY OF PHOSPHORUS: CPT | Performed by: PHYSICIAN ASSISTANT

## 2020-11-06 PROCEDURE — 80048 BASIC METABOLIC PNL TOTAL CA: CPT | Performed by: PHYSICIAN ASSISTANT

## 2020-11-06 PROCEDURE — 99221 1ST HOSP IP/OBS SF/LOW 40: CPT | Performed by: NURSE PRACTITIONER

## 2020-11-06 PROCEDURE — 85025 COMPLETE CBC W/AUTO DIFF WBC: CPT | Performed by: PHYSICIAN ASSISTANT

## 2020-11-06 PROCEDURE — 83735 ASSAY OF MAGNESIUM: CPT | Performed by: PHYSICIAN ASSISTANT

## 2020-11-06 PROCEDURE — 74177 CT ABD & PELVIS W/CONTRAST: CPT

## 2020-11-06 PROCEDURE — G1004 CDSM NDSC: HCPCS

## 2020-11-06 PROCEDURE — 99024 POSTOP FOLLOW-UP VISIT: CPT | Performed by: PHYSICIAN ASSISTANT

## 2020-11-06 PROCEDURE — 99232 SBSQ HOSP IP/OBS MODERATE 35: CPT | Performed by: INTERNAL MEDICINE

## 2020-11-06 RX ORDER — OXYCODONE HYDROCHLORIDE 5 MG/1
5 TABLET ORAL EVERY 4 HOURS PRN
Qty: 18 TABLET | Refills: 0 | Status: SHIPPED | OUTPATIENT
Start: 2020-11-06 | End: 2020-11-09

## 2020-11-06 RX ORDER — ACETAMINOPHEN 325 MG/1
650 TABLET ORAL EVERY 6 HOURS PRN
Qty: 60 TABLET | Refills: 0
Start: 2020-11-06 | End: 2021-03-12 | Stop reason: ALTCHOICE

## 2020-11-06 RX ADMIN — LOSARTAN POTASSIUM 100 MG: 50 TABLET, FILM COATED ORAL at 08:26

## 2020-11-06 RX ADMIN — IOHEXOL 100 ML: 350 INJECTION, SOLUTION INTRAVENOUS at 14:16

## 2020-11-06 RX ADMIN — AMLODIPINE BESYLATE 10 MG: 5 TABLET ORAL at 08:26

## 2020-11-06 RX ADMIN — OXYCODONE HYDROCHLORIDE 10 MG: 5 TABLET ORAL at 16:21

## 2020-11-06 RX ADMIN — HEPARIN SODIUM 5000 UNITS: 5000 INJECTION INTRAVENOUS; SUBCUTANEOUS at 21:15

## 2020-11-06 RX ADMIN — FAMOTIDINE 20 MG: 20 TABLET, FILM COATED ORAL at 08:26

## 2020-11-06 RX ADMIN — HYDROMORPHONE HYDROCHLORIDE 1 MG: 1 INJECTION, SOLUTION INTRAMUSCULAR; INTRAVENOUS; SUBCUTANEOUS at 08:25

## 2020-11-06 RX ADMIN — HEPARIN SODIUM 5000 UNITS: 5000 INJECTION INTRAVENOUS; SUBCUTANEOUS at 16:20

## 2020-11-06 RX ADMIN — LEVOTHYROXINE SODIUM 75 MCG: 75 TABLET ORAL at 05:21

## 2020-11-06 RX ADMIN — HYDROMORPHONE HYDROCHLORIDE 1 MG: 1 INJECTION, SOLUTION INTRAMUSCULAR; INTRAVENOUS; SUBCUTANEOUS at 20:29

## 2020-11-06 RX ADMIN — HYDROMORPHONE HYDROCHLORIDE 1 MG: 1 INJECTION, SOLUTION INTRAMUSCULAR; INTRAVENOUS; SUBCUTANEOUS at 13:36

## 2020-11-06 RX ADMIN — HEPARIN SODIUM 5000 UNITS: 5000 INJECTION INTRAVENOUS; SUBCUTANEOUS at 05:21

## 2020-11-06 RX ADMIN — PRAVASTATIN SODIUM 20 MG: 20 TABLET ORAL at 16:20

## 2020-11-06 RX ADMIN — OXYCODONE HYDROCHLORIDE 10 MG: 5 TABLET ORAL at 05:27

## 2020-11-06 RX ADMIN — MELATONIN TAB 3 MG 6 MG: 3 TAB at 21:14

## 2020-11-06 RX ADMIN — Medication 1000 MG: at 16:21

## 2020-11-06 RX ADMIN — HYDROMORPHONE HYDROCHLORIDE 1 MG: 1 INJECTION, SOLUTION INTRAMUSCULAR; INTRAVENOUS; SUBCUTANEOUS at 03:38

## 2020-11-06 RX ADMIN — OXYCODONE HYDROCHLORIDE 10 MG: 5 TABLET ORAL at 11:01

## 2020-11-06 RX ADMIN — PANTOPRAZOLE SODIUM 40 MG: 40 TABLET, DELAYED RELEASE ORAL at 05:21

## 2020-11-06 RX ADMIN — OXYCODONE HYDROCHLORIDE 10 MG: 5 TABLET ORAL at 23:10

## 2020-11-07 VITALS
WEIGHT: 162.7 LBS | RESPIRATION RATE: 17 BRPM | HEIGHT: 72 IN | HEART RATE: 111 BPM | SYSTOLIC BLOOD PRESSURE: 137 MMHG | TEMPERATURE: 99 F | OXYGEN SATURATION: 93 % | BODY MASS INDEX: 22.04 KG/M2 | DIASTOLIC BLOOD PRESSURE: 82 MMHG

## 2020-11-07 LAB
ANION GAP SERPL CALCULATED.3IONS-SCNC: 5 MMOL/L (ref 4–13)
BUN SERPL-MCNC: 7 MG/DL (ref 5–25)
CALCIUM SERPL-MCNC: 7.8 MG/DL (ref 8.3–10.1)
CHLORIDE SERPL-SCNC: 101 MMOL/L (ref 100–108)
CO2 SERPL-SCNC: 27 MMOL/L (ref 21–32)
CREAT SERPL-MCNC: 0.58 MG/DL (ref 0.6–1.3)
ERYTHROCYTE [DISTWIDTH] IN BLOOD BY AUTOMATED COUNT: 14.4 % (ref 11.6–15.1)
GFR SERPL CREATININE-BSD FRML MDRD: 118 ML/MIN/1.73SQ M
GLUCOSE SERPL-MCNC: 103 MG/DL (ref 65–140)
HCT VFR BLD AUTO: 28.9 % (ref 36.5–49.3)
HGB BLD-MCNC: 9.4 G/DL (ref 12–17)
MCH RBC QN AUTO: 29.8 PG (ref 26.8–34.3)
MCHC RBC AUTO-ENTMCNC: 32.5 G/DL (ref 31.4–37.4)
MCV RBC AUTO: 92 FL (ref 82–98)
PLATELET # BLD AUTO: 483 THOUSANDS/UL (ref 149–390)
PMV BLD AUTO: 8.2 FL (ref 8.9–12.7)
POTASSIUM SERPL-SCNC: 4.1 MMOL/L (ref 3.5–5.3)
RBC # BLD AUTO: 3.15 MILLION/UL (ref 3.88–5.62)
SODIUM SERPL-SCNC: 133 MMOL/L (ref 136–145)
WBC # BLD AUTO: 18.05 THOUSAND/UL (ref 4.31–10.16)

## 2020-11-07 PROCEDURE — 99232 SBSQ HOSP IP/OBS MODERATE 35: CPT | Performed by: PHYSICIAN ASSISTANT

## 2020-11-07 PROCEDURE — 99024 POSTOP FOLLOW-UP VISIT: CPT | Performed by: PHYSICIAN ASSISTANT

## 2020-11-07 PROCEDURE — 80048 BASIC METABOLIC PNL TOTAL CA: CPT | Performed by: INTERNAL MEDICINE

## 2020-11-07 PROCEDURE — 85027 COMPLETE CBC AUTOMATED: CPT | Performed by: INTERNAL MEDICINE

## 2020-11-07 RX ADMIN — OXYCODONE HYDROCHLORIDE 10 MG: 5 TABLET ORAL at 07:33

## 2020-11-07 RX ADMIN — AMLODIPINE BESYLATE 10 MG: 5 TABLET ORAL at 08:48

## 2020-11-07 RX ADMIN — LOSARTAN POTASSIUM 100 MG: 50 TABLET, FILM COATED ORAL at 08:48

## 2020-11-07 RX ADMIN — LEVOTHYROXINE SODIUM 75 MCG: 75 TABLET ORAL at 05:22

## 2020-11-07 RX ADMIN — Medication 1000 MG: at 14:15

## 2020-11-07 RX ADMIN — HEPARIN SODIUM 5000 UNITS: 5000 INJECTION INTRAVENOUS; SUBCUTANEOUS at 05:22

## 2020-11-07 RX ADMIN — PANTOPRAZOLE SODIUM 40 MG: 40 TABLET, DELAYED RELEASE ORAL at 05:26

## 2020-11-12 ENCOUNTER — HOSPITAL ENCOUNTER (EMERGENCY)
Facility: HOSPITAL | Age: 50
Discharge: HOME | End: 2020-11-12
Attending: EMERGENCY MEDICINE
Payer: COMMERCIAL

## 2020-11-12 VITALS
HEIGHT: 72 IN | DIASTOLIC BLOOD PRESSURE: 75 MMHG | WEIGHT: 155 LBS | HEART RATE: 118 BPM | SYSTOLIC BLOOD PRESSURE: 121 MMHG | TEMPERATURE: 97.7 F | OXYGEN SATURATION: 99 % | RESPIRATION RATE: 16 BRPM | BODY MASS INDEX: 20.99 KG/M2

## 2020-11-12 DIAGNOSIS — Z43.3 COLOSTOMY CARE (HCC): Primary | ICD-10-CM

## 2020-11-12 PROCEDURE — 99282 EMERGENCY DEPT VISIT SF MDM: CPT

## 2020-11-12 PROCEDURE — 99282 EMERGENCY DEPT VISIT SF MDM: CPT | Performed by: EMERGENCY MEDICINE

## 2020-11-13 ENCOUNTER — HOSPITAL ENCOUNTER (OUTPATIENT)
Dept: INTERVENTIONAL RADIOLOGY/VASCULAR | Facility: HOSPITAL | Age: 50
Discharge: HOME | End: 2020-11-13
Admitting: RADIOLOGY
Payer: COMMERCIAL

## 2020-11-13 DIAGNOSIS — K57.20 DIVERTICULITIS OF LARGE INTESTINE WITH PERFORATION WITHOUT BLEEDING: ICD-10-CM

## 2020-11-13 PROCEDURE — 76080 X-RAY EXAM OF FISTULA: CPT | Performed by: RADIOLOGY

## 2020-11-13 PROCEDURE — 76080 X-RAY EXAM OF FISTULA: CPT

## 2020-11-13 PROCEDURE — 49424 ASSESS CYST CONTRAST INJECT: CPT | Performed by: RADIOLOGY

## 2020-11-13 PROCEDURE — 49424 ASSESS CYST CONTRAST INJECT: CPT

## 2020-11-13 RX ADMIN — IOHEXOL 5 ML: 350 INJECTION, SOLUTION INTRAVENOUS at 11:24

## 2020-11-16 ENCOUNTER — OFFICE VISIT (OUTPATIENT)
Dept: SURGERY | Facility: HOSPITAL | Age: 50
End: 2020-11-16

## 2020-11-16 ENCOUNTER — TELEPHONE (OUTPATIENT)
Dept: SURGERY | Facility: HOSPITAL | Age: 50
End: 2020-11-16

## 2020-11-16 VITALS
BODY MASS INDEX: 21.78 KG/M2 | DIASTOLIC BLOOD PRESSURE: 77 MMHG | SYSTOLIC BLOOD PRESSURE: 116 MMHG | HEIGHT: 72 IN | WEIGHT: 160.8 LBS | HEART RATE: 114 BPM

## 2020-11-16 DIAGNOSIS — K57.20 DIVERTICULITIS OF LARGE INTESTINE WITH PERFORATION AND ABSCESS: ICD-10-CM

## 2020-11-16 DIAGNOSIS — Z98.890 POST-OPERATIVE STATE: Primary | ICD-10-CM

## 2020-11-16 PROCEDURE — 99024 POSTOP FOLLOW-UP VISIT: CPT | Performed by: PHYSICIAN ASSISTANT

## 2020-11-21 ENCOUNTER — HOSPITAL ENCOUNTER (OUTPATIENT)
Dept: CT IMAGING | Facility: HOSPITAL | Age: 50
Discharge: HOME | End: 2020-11-21
Payer: COMMERCIAL

## 2020-11-21 DIAGNOSIS — K57.20 DIVERTICULITIS OF LARGE INTESTINE WITH PERFORATION AND ABSCESS: ICD-10-CM

## 2020-11-21 PROCEDURE — 74177 CT ABD & PELVIS W/CONTRAST: CPT

## 2020-11-21 PROCEDURE — G1004 CDSM NDSC: HCPCS

## 2020-11-21 RX ADMIN — IOHEXOL 100 ML: 350 INJECTION, SOLUTION INTRAVENOUS at 09:20

## 2020-11-23 ENCOUNTER — OFFICE VISIT (OUTPATIENT)
Dept: SURGERY | Facility: HOSPITAL | Age: 50
End: 2020-11-23

## 2020-11-23 VITALS
BODY MASS INDEX: 22.08 KG/M2 | HEIGHT: 72 IN | HEART RATE: 105 BPM | WEIGHT: 163 LBS | SYSTOLIC BLOOD PRESSURE: 97 MMHG | DIASTOLIC BLOOD PRESSURE: 68 MMHG | TEMPERATURE: 97.8 F | RESPIRATION RATE: 16 BRPM

## 2020-11-23 DIAGNOSIS — Z98.890 POST-OPERATIVE STATE: Primary | ICD-10-CM

## 2020-11-23 PROCEDURE — 99024 POSTOP FOLLOW-UP VISIT: CPT | Performed by: PHYSICIAN ASSISTANT

## 2020-11-25 ENCOUNTER — PREP FOR PROCEDURE (OUTPATIENT)
Dept: INTERVENTIONAL RADIOLOGY/VASCULAR | Facility: CLINIC | Age: 50
End: 2020-11-25

## 2020-11-25 DIAGNOSIS — K65.1 INTRA-ABDOMINAL ABSCESS (HCC): Primary | ICD-10-CM

## 2020-11-25 RX ORDER — SODIUM CHLORIDE 9 MG/ML
75 INJECTION, SOLUTION INTRAVENOUS CONTINUOUS
Status: CANCELLED | OUTPATIENT
Start: 2020-11-25

## 2020-11-27 ENCOUNTER — ANESTHESIA (OUTPATIENT)
Dept: CT IMAGING | Facility: HOSPITAL | Age: 50
End: 2020-11-27

## 2020-11-27 ENCOUNTER — HOSPITAL ENCOUNTER (OUTPATIENT)
Dept: CT IMAGING | Facility: HOSPITAL | Age: 50
Discharge: HOME | End: 2020-11-27
Attending: RADIOLOGY | Admitting: RADIOLOGY
Payer: COMMERCIAL

## 2020-11-27 ENCOUNTER — ANESTHESIA EVENT (OUTPATIENT)
Dept: CT IMAGING | Facility: HOSPITAL | Age: 50
End: 2020-11-27

## 2020-11-27 VITALS
OXYGEN SATURATION: 96 % | RESPIRATION RATE: 16 BRPM | HEART RATE: 76 BPM | DIASTOLIC BLOOD PRESSURE: 68 MMHG | TEMPERATURE: 98 F | SYSTOLIC BLOOD PRESSURE: 109 MMHG

## 2020-11-27 DIAGNOSIS — K57.20 DIVERTICULITIS OF LARGE INTESTINE WITH PERFORATION WITHOUT BLEEDING: Primary | ICD-10-CM

## 2020-11-27 DIAGNOSIS — K65.1 INTRA-ABDOMINAL ABSCESS (HCC): ICD-10-CM

## 2020-11-27 PROCEDURE — 99153 MOD SED SAME PHYS/QHP EA: CPT

## 2020-11-27 PROCEDURE — 87205 SMEAR GRAM STAIN: CPT | Performed by: RADIOLOGY

## 2020-11-27 PROCEDURE — C1769 GUIDE WIRE: HCPCS

## 2020-11-27 PROCEDURE — 77012 CT SCAN FOR NEEDLE BIOPSY: CPT | Performed by: RADIOLOGY

## 2020-11-27 PROCEDURE — 49406 IMAGE CATH FLUID PERI/RETRO: CPT

## 2020-11-27 PROCEDURE — 87070 CULTURE OTHR SPECIMN AEROBIC: CPT | Performed by: RADIOLOGY

## 2020-11-27 PROCEDURE — 99152 MOD SED SAME PHYS/QHP 5/>YRS: CPT

## 2020-11-27 PROCEDURE — 99152 MOD SED SAME PHYS/QHP 5/>YRS: CPT | Performed by: RADIOLOGY

## 2020-11-27 PROCEDURE — 10160 PNXR ASPIR ABSC HMTMA BULLA: CPT | Performed by: RADIOLOGY

## 2020-11-27 RX ORDER — MIDAZOLAM HYDROCHLORIDE 2 MG/2ML
INJECTION, SOLUTION INTRAMUSCULAR; INTRAVENOUS CODE/TRAUMA/SEDATION MEDICATION
Status: COMPLETED | OUTPATIENT
Start: 2020-11-27 | End: 2020-11-27

## 2020-11-27 RX ORDER — FENTANYL CITRATE 50 UG/ML
INJECTION, SOLUTION INTRAMUSCULAR; INTRAVENOUS CODE/TRAUMA/SEDATION MEDICATION
Status: COMPLETED | OUTPATIENT
Start: 2020-11-27 | End: 2020-11-27

## 2020-11-27 RX ORDER — SODIUM CHLORIDE 9 MG/ML
75 INJECTION, SOLUTION INTRAVENOUS CONTINUOUS
Status: DISCONTINUED | OUTPATIENT
Start: 2020-11-27 | End: 2020-11-28 | Stop reason: HOSPADM

## 2020-11-27 RX ADMIN — MIDAZOLAM 2 MG: 1 INJECTION INTRAMUSCULAR; INTRAVENOUS at 10:45

## 2020-11-27 RX ADMIN — MIDAZOLAM 2 MG: 1 INJECTION INTRAMUSCULAR; INTRAVENOUS at 10:49

## 2020-11-27 RX ADMIN — FENTANYL CITRATE 50 MCG: 50 INJECTION, SOLUTION INTRAMUSCULAR; INTRAVENOUS at 10:45

## 2020-11-27 RX ADMIN — FENTANYL CITRATE 50 MCG: 50 INJECTION, SOLUTION INTRAMUSCULAR; INTRAVENOUS at 10:49

## 2020-11-27 NOTE — ANESTHESIA PREPROCEDURE EVALUATION
Procedure:  IR DRAINAGE TUBE PLACEMENT    Relevant Problems   CARDIO   (+) HTN (hypertension)   (+) Hyperlipidemia      ENDO   (+) Hypothyroidism        Physical Exam    Airway    Mallampati score: II  TM Distance: >3 FB  Neck ROM: full     Dental   No notable dental hx     Cardiovascular  Rhythm: regular, Rate: normal, Cardiovascular exam normal    Pulmonary  Pulmonary exam normal Breath sounds clear to auscultation,     Other Findings        Anesthesia Plan  ASA Score- 2     Anesthesia Type- IV sedation with anesthesia with ASA Monitors.         Additional Monitors:   Airway Plan:           Plan Factors-    Chart reviewed.             Induction- intravenous.    Postoperative Plan-     Informed Consent- Anesthetic plan and risks discussed with patient.  I personally reviewed this patient with the CRNA. Discussed and agreed on the Anesthesia Plan with the CRNA..

## 2020-11-30 LAB
BACTERIA SPEC BFLD CULT: NO GROWTH
GRAM STN SPEC: NORMAL

## 2020-12-07 ENCOUNTER — OFFICE VISIT (OUTPATIENT)
Dept: SURGERY | Facility: HOSPITAL | Age: 50
End: 2020-12-07

## 2020-12-07 VITALS — HEIGHT: 72 IN | TEMPERATURE: 97.3 F | WEIGHT: 163 LBS | BODY MASS INDEX: 22.08 KG/M2

## 2020-12-07 DIAGNOSIS — Z98.890 POST-OPERATIVE STATE: Primary | ICD-10-CM

## 2020-12-07 PROCEDURE — 99024 POSTOP FOLLOW-UP VISIT: CPT | Performed by: PHYSICIAN ASSISTANT

## 2020-12-12 ENCOUNTER — TELEPHONE (OUTPATIENT)
Dept: OTHER | Facility: OTHER | Age: 50
End: 2020-12-12

## 2020-12-13 ENCOUNTER — HOSPITAL ENCOUNTER (EMERGENCY)
Facility: HOSPITAL | Age: 50
Discharge: HOME | End: 2020-12-13
Attending: EMERGENCY MEDICINE
Payer: COMMERCIAL

## 2020-12-13 VITALS
HEIGHT: 72 IN | HEART RATE: 116 BPM | SYSTOLIC BLOOD PRESSURE: 126 MMHG | RESPIRATION RATE: 18 BRPM | OXYGEN SATURATION: 99 % | DIASTOLIC BLOOD PRESSURE: 78 MMHG | TEMPERATURE: 97.3 F | BODY MASS INDEX: 22.35 KG/M2 | WEIGHT: 165 LBS

## 2020-12-13 DIAGNOSIS — K63.4 PROLAPSE OF INTESTINE: Primary | ICD-10-CM

## 2020-12-13 PROCEDURE — 99282 EMERGENCY DEPT VISIT SF MDM: CPT

## 2020-12-13 PROCEDURE — 99284 EMERGENCY DEPT VISIT MOD MDM: CPT | Performed by: EMERGENCY MEDICINE

## 2020-12-14 ENCOUNTER — OFFICE VISIT (OUTPATIENT)
Dept: SURGERY | Facility: HOSPITAL | Age: 50
End: 2020-12-14

## 2020-12-14 VITALS
RESPIRATION RATE: 16 BRPM | DIASTOLIC BLOOD PRESSURE: 77 MMHG | SYSTOLIC BLOOD PRESSURE: 108 MMHG | BODY MASS INDEX: 22.32 KG/M2 | HEART RATE: 109 BPM | HEIGHT: 72 IN | TEMPERATURE: 97.6 F | WEIGHT: 164.8 LBS

## 2020-12-14 DIAGNOSIS — Z09 POSTOP CHECK: ICD-10-CM

## 2020-12-14 DIAGNOSIS — K63.5 POLYP OF COLON: Primary | ICD-10-CM

## 2020-12-14 PROCEDURE — 88305 TISSUE EXAM BY PATHOLOGIST: CPT | Performed by: PATHOLOGY

## 2020-12-14 PROCEDURE — 99024 POSTOP FOLLOW-UP VISIT: CPT | Performed by: SURGERY

## 2020-12-28 ENCOUNTER — OFFICE VISIT (OUTPATIENT)
Dept: SURGERY | Facility: HOSPITAL | Age: 50
End: 2020-12-28

## 2020-12-28 VITALS
RESPIRATION RATE: 16 BRPM | HEART RATE: 118 BPM | DIASTOLIC BLOOD PRESSURE: 74 MMHG | WEIGHT: 161 LBS | SYSTOLIC BLOOD PRESSURE: 108 MMHG | TEMPERATURE: 98.1 F | HEIGHT: 72 IN | BODY MASS INDEX: 21.81 KG/M2

## 2020-12-28 DIAGNOSIS — K94.09 COLOSTOMY PROLAPSE (HCC): Primary | ICD-10-CM

## 2020-12-28 PROCEDURE — 99024 POSTOP FOLLOW-UP VISIT: CPT | Performed by: PHYSICIAN ASSISTANT

## 2021-01-18 ENCOUNTER — OFFICE VISIT (OUTPATIENT)
Dept: SURGERY | Facility: HOSPITAL | Age: 51
End: 2021-01-18

## 2021-01-18 VITALS
DIASTOLIC BLOOD PRESSURE: 84 MMHG | TEMPERATURE: 97.5 F | RESPIRATION RATE: 16 BRPM | SYSTOLIC BLOOD PRESSURE: 138 MMHG | WEIGHT: 162.6 LBS | HEART RATE: 88 BPM | HEIGHT: 72 IN | BODY MASS INDEX: 22.02 KG/M2

## 2021-01-18 DIAGNOSIS — K57.20 DIVERTICULITIS OF LARGE INTESTINE WITH PERFORATION: Primary | ICD-10-CM

## 2021-01-18 PROCEDURE — 99024 POSTOP FOLLOW-UP VISIT: CPT | Performed by: PHYSICIAN ASSISTANT

## 2021-01-18 RX ORDER — FERROUS SULFATE 325(65) MG
325 TABLET ORAL
COMMUNITY

## 2021-01-18 RX ORDER — POTASSIUM CHLORIDE 750 MG/1
CAPSULE, EXTENDED RELEASE ORAL 2 TIMES DAILY
COMMUNITY

## 2021-01-18 NOTE — PATIENT INSTRUCTIONS
You are doing well  At this time he will require a colonoscopy prior to any surgical intervention  We have scheduled  GI follow-up for you at this time  After colonoscopy return to the office to schedule sigmoid colon resection and reversal of your colostomy  Continue to monitor ostomy site  Please call with any changes, questions, or concerns

## 2021-01-18 NOTE — PROGRESS NOTES
Assessment/Plan:   Dimitry Hodge is a 45 y o male who comes in today  For follow-up  Perforated sigmoid colon diverticulitis with multiple intra-abdominal abscesses  -s/p  Drainage of intra-abdominal abscesses and diverting colostomy  - last abdominal drain removed 12/07/2020  - off antibiotics since last drain removed,  Followed by ID  - status post prolapse of colostomy x2  with reduction in the office  - patient is doing well, eating well, no fevers or chills, ostomy remains intact and reduced and is functioning well  - will refer patient to Gastroenterology for colonoscopy prior to further surgical intervention, patient has never had a colonoscopy  - after colonoscopy will have patient return to schedule sigmoid colectomy and reversal of colostomy  - all questions answered    Colon polyp  - patient had a polyp removed  In the office,  found in prolapsed colostomy  -pathology:Intestine, :   -Tubular adenoma   -No evidence of high grade dysplasia or malignancy seen  benign finding, no need for further intervention      HPI:  Dimitry Hodge is a 45 y o male who comes in today for postoperative check  Removal of abdominal drains and discontinuation of antibiotics  Patient also with colostomy prolapse x2 with reduction in the office  states he is doing well  Denies abdominal pain  States he is eating well  No nausea or vomiting  No fevers or chills  His weight has been stable  His ostomy is functioning well  States he noticed a mild protrusion of his colostomylast week which resolved after he laid down  He has been limiting his activities  Patient is anxious to have his colostomy reversed          ROS:  General ROS: negative for - chills, fatigue, fever or night sweats, weight loss  Respiratory ROS: no cough, shortness of breath, or wheezing  Cardiovascular ROS: no chest pain or dyspnea on exertion  Abdomen: as per HPI  Genito-Urinary ROS: no dysuria, trouble voiding, or hematuria  Musculoskeletal ROS: negative for - gait disturbance, joint pain or muscle pain  Neurological ROS: no TIA or stroke symptoms  Skin ROS: no lesions, rashes, open wounds    ALLERGIES  Patient has no known allergies      Current Outpatient Medications:     ferrous sulfate 325 (65 Fe) mg tablet, Take 325 mg by mouth daily with breakfast, Disp: , Rfl:     LEVOTHYROXINE SODIUM PO, Take 75 mcg by mouth daily , Disp: , Rfl:     Olmesartan-amLODIPine-HCTZ (TRIBENZOR PO), Take by mouth daily Pt reports dose is 40-10-25mg, Disp: , Rfl:     potassium chloride (MICRO-K) 10 MEQ CR capsule, Take 10 mEq by mouth 2 (two) times a day, Disp: , Rfl:     SIMVASTATIN PO, Take 20 mg by mouth daily , Disp: , Rfl:     acetaminophen (TYLENOL) 325 mg tablet, Take 2 tablets (650 mg total) by mouth every 6 (six) hours as needed for mild pain (Patient not taking: Reported on 11/16/2020), Disp: 60 tablet, Rfl: 0    Coenzyme Q10 (CO Q 10 PO), Take by mouth, Disp: , Rfl:     sodium chloride, PF, 0 9 %, Infuse 10 mL into a venous catheter daily Flush each drain with 10cc (1 syringe) daily (Patient not taking: Reported on 11/16/2020), Disp: 60 Syringe, Rfl: 3  Past Medical History:   Diagnosis Date    Disease of thyroid gland     Diverticulitis     Hypertension      Past Surgical History:   Procedure Laterality Date    ABDOMINAL ADHESION SURGERY N/A 10/12/2020    Procedure: LYSIS ADHESIONS extensive;  Surgeon: Salley Schaumann, MD;  Location: [facility];  Service: General    COLOSTOMY      IR DRAINAGE TUBE CHECK AND/OR REMOVAL  11/13/2020    IR DRAINAGE TUBE PLACEMENT  10/23/2020    IR DRAINAGE TUBE PLACEMENT  11/27/2020    IR PICC LINE PLACEMENT DOUBLE LUMEN  10/16/2020    [place] LAP,DIAGNOSTIC ABDOMEN N/A 10/12/2020    Procedure: LAPAROSCOPY DIAGNOSTIC, drainage of pelvic abcess, placement drain;  Surgeon: Salley Schaumann, MD;  Location: [facility];  Service: General    [place] LAP,DIAGNOSTIC ABDOMEN N/A 11/2/2020    Procedure: LAPAROSCOPY DIAGNOSTIC, colostomy, repair of enterotomy;  Surgeon: Enriqueta Mayes MD;  Location:  MAIN OR;  Service: General     No family history on file  reports that he has quit smoking  He has never used smokeless tobacco  He reports current alcohol use  He reports current drug use  Frequency: 7 00 times per week  Drug: Marijuana      PHYSICAL EXAM    Vitals:    01/18/21 1012   BP: 138/84   Pulse: 88   Resp: 16   Temp: 97 5 °F (36 4 °C)       General: normal, cooperative, no distress, slightly anxious  Abdominal: soft, nondistended, nontender or normal bowel sounds    Ostomy is viable with brown stool output  Incision: clean, dry, and intact and healing well    Clarita Scales ROSAURA [place]

## 2021-01-20 NOTE — PROGRESS NOTES
Seen and examined patient presents for prolapse of colostomy  AVSS Afebrile  Soft +BS ND NT + colostomy prolapse    Prolapse of colostomy - using sugar then salt and gentle pressure the colostomy was reduced, patient sxs relieved he knows to return if colostomy prolapses, a polyp was noted on the prolapsed colon and this was excised with scissors and sent for path

## 2021-01-25 ENCOUNTER — TELEMEDICINE (OUTPATIENT)
Dept: GASTROENTEROLOGY | Facility: CLINIC | Age: 51
End: 2021-01-25
Payer: COMMERCIAL

## 2021-01-25 VITALS — BODY MASS INDEX: 22.35 KG/M2 | WEIGHT: 165 LBS | HEIGHT: 72 IN

## 2021-01-25 DIAGNOSIS — Z12.11 SCREENING FOR COLON CANCER: ICD-10-CM

## 2021-01-25 DIAGNOSIS — K57.20 DIVERTICULITIS OF LARGE INTESTINE WITH PERFORATION WITHOUT BLEEDING: Primary | ICD-10-CM

## 2021-01-25 PROCEDURE — 99203 OFFICE O/P NEW LOW 30 MIN: CPT | Performed by: INTERNAL MEDICINE

## 2021-01-25 NOTE — H&P (VIEW-ONLY)
Virtual Regular Visit      Assessment/Plan:    Problem List Items Addressed This Visit        Digestive    Diverticulitis of large intestine with perforation - Primary      Other Visit Diagnoses     Screening for colon cancer            - patient is a very pleasant 53-year-old male who had perforated diverticulitis in October 2020 treated with a diverting colostomy and the placement of 4 surgical drains which have since been removed  He is essentially asymptomatic off antibiotics for 3 weeks  Surgery is requesting colonoscopy prior to reanastomosis  I believe that he should have colonoscopy I have scheduled him to be done in the near future  Will examine the entire colon by way of the ostomy attempt to  Examine the distal colon as much as possible at the time of the procedure  Reason for visit is   Chief Complaint   Patient presents with    Colon Cancer Screening    Virtual Regular Visit        Encounter provider Tan Dubois MD    Provider located at [address] [address] [phone]      Recent Visits  No visits were found meeting these conditions  Showing recent visits within past 7 days and meeting all other requirements     Today's Visits  Date Type Provider Dept   01/25/21 Telemedicine Tan Dubois MD [facility]   Showing today's visits and meeting all other requirements     Future Appointments  No visits were found meeting these conditions  Showing future appointments within next 150 days and meeting all other requirements        The patient was identified by name and date of birth  Lerry Book was informed that this is a telemedicine visit and that the visit is being conducted through [address] [place] [address] and patient was informed that this is not a secure, HIPAA-compliant platform  He agrees to proceed     My office door was closed  No one else was in the room    He acknowledged consent and understanding of privacy and security of the video platform  The patient has agreed to participate and understands they can discontinue the visit at any time  Patient is aware this is a billable service  Subjective  Alden Elder is a 45 y o  male  Who experienced abdominal pain in October 2020 was diagnosed with a perforated diverticulitis  He was treated with surgery with a loop colostomy or diverting colostomy as well as for drains that were placed surgically  He has had ups and Downs but over the last 3-4 weeks he has been feeling well without abdominal pain off antibiotics with the drains removed  He has normal affluent from the ostomy and sometimes from the rectum  No bleeding  He says he had a few episodes of pain prior to his perforation in October but nothing that brought him to the hospital   He denies any personal history of colonoscopy his father had colon cancer as well as his grandmother         HPI     Past Medical History:   Diagnosis Date    Disease of thyroid gland     Diverticulitis     Hypertension        Past Surgical History:   Procedure Laterality Date    ABDOMINAL ADHESION SURGERY N/A 10/12/2020    Procedure: LYSIS ADHESIONS extensive;  Surgeon: Radha Arriola MD;  Location: [facility];  Service: General    COLOSTOMY      IR DRAINAGE TUBE CHECK AND/OR REMOVAL  11/13/2020    IR DRAINAGE TUBE PLACEMENT  10/23/2020    IR DRAINAGE TUBE PLACEMENT  11/27/2020    IR PICC LINE PLACEMENT DOUBLE LUMEN  10/16/2020    [place] LAP,DIAGNOSTIC ABDOMEN N/A 10/12/2020    Procedure: LAPAROSCOPY DIAGNOSTIC, drainage of pelvic abcess, placement drain;  Surgeon: Radha Arriola MD;  Location: [facility];  Service: General    [place] LAP,DIAGNOSTIC ABDOMEN N/A 11/2/2020    Procedure: LAPAROSCOPY DIAGNOSTIC, colostomy, repair of enterotomy;  Surgeon: Radha Arriola MD;  Location: [facility];  Service: General       Current Outpatient Medications   Medication Sig Dispense Refill    ferrous sulfate 325 (65 Fe) mg tablet Take 325 mg by mouth daily with breakfast      LEVOTHYROXINE SODIUM PO Take 75 mcg by mouth daily       Olmesartan-amLODIPine-HCTZ (TRIBENZOR PO) Take by mouth daily Pt reports dose is 40-10-25mg      potassium chloride (MICRO-K) 10 MEQ CR capsule Take 10 mEq by mouth 2 (two) times a day      SIMVASTATIN PO Take 20 mg by mouth daily       acetaminophen (TYLENOL) 325 mg tablet Take 2 tablets (650 mg total) by mouth every 6 (six) hours as needed for mild pain (Patient not taking: Reported on 11/16/2020) 60 tablet 0    Coenzyme Q10 (CO Q 10 PO) Take by mouth      sodium chloride, PF, 0 9 % Infuse 10 mL into a venous catheter daily Flush each drain with 10cc (1 syringe) daily (Patient not taking: Reported on 11/16/2020) 60 Syringe 3     No current facility-administered medications for this visit  No Known Allergies    Review of Systems negative except as per the HPI    Video Exam    Vitals:    01/25/21 1132   Weight: 74 8 kg (165 lb)   Height: 6' (1 829 m)       Physical Exam  By video awake alert no acute distress skin warm and dry ENT examined neuro grossly intact    I spent  20 minutes directly with the patient during this visit      VIRTUAL VISIT DISCLAIMER    Aaron Canales acknowledges that he has consented to an online visit or consultation  He understands that the online visit is based solely on information provided by him, and that, in the absence of a face-to-face physical evaluation by the physician, the diagnosis he receives is both limited and provisional in terms of accuracy and completeness  This is not intended to replace a full medical face-to-face evaluation by the physician  Aaron Canales understands and accepts these terms

## 2021-01-25 NOTE — PROGRESS NOTES
Virtual Regular Visit      Assessment/Plan:    Problem List Items Addressed This Visit        Digestive    Diverticulitis of large intestine with perforation - Primary      Other Visit Diagnoses     Screening for colon cancer            - patient is a very pleasant 58-year-old male who had perforated diverticulitis in October 2020 treated with a diverting colostomy and the placement of 4 surgical drains which have since been removed  He is essentially asymptomatic off antibiotics for 3 weeks  Surgery is requesting colonoscopy prior to reanastomosis  I believe that he should have colonoscopy I have scheduled him to be done in the near future  Will examine the entire colon by way of the ostomy attempt to  Examine the distal colon as much as possible at the time of the procedure  Reason for visit is   Chief Complaint   Patient presents with    Colon Cancer Screening    Virtual Regular Visit        Encounter provider Blossom Martínez MD    Provider located at [address] [address] [phone]      Recent Visits  No visits were found meeting these conditions  Showing recent visits within past 7 days and meeting all other requirements     Today's Visits  Date Type Provider Dept   01/25/21 Telemedicine Blossom Martínez MD [facility]   Showing today's visits and meeting all other requirements     Future Appointments  No visits were found meeting these conditions  Showing future appointments within next 150 days and meeting all other requirements        The patient was identified by name and date of birth  Beny Sorto was informed that this is a telemedicine visit and that the visit is being conducted through [address] [place] [address] and patient was informed that this is not a secure, HIPAA-compliant platform  He agrees to proceed     My office door was closed  No one else was in the room    He acknowledged consent and understanding of privacy and security of the video platform  The patient has agreed to participate and understands they can discontinue the visit at any time  Patient is aware this is a billable service  Villa Barnett is a 45 y o  male  Who experienced abdominal pain in October 2020 was diagnosed with a perforated diverticulitis  He was treated with surgery with a loop colostomy or diverting colostomy as well as for drains that were placed surgically  He has had ups and Downs but over the last 3-4 weeks he has been feeling well without abdominal pain off antibiotics with the drains removed  He has normal affluent from the ostomy and sometimes from the rectum  No bleeding  He says he had a few episodes of pain prior to his perforation in October but nothing that brought him to the hospital   He denies any personal history of colonoscopy his father had colon cancer as well as his grandmother         HPI     Past Medical History:   Diagnosis Date    Disease of thyroid gland     Diverticulitis     Hypertension        Past Surgical History:   Procedure Laterality Date    ABDOMINAL ADHESION SURGERY N/A 10/12/2020    Procedure: LYSIS ADHESIONS extensive;  Surgeon: Di Banda MD;  Location: [facility];  Service: General    COLOSTOMY      IR DRAINAGE TUBE CHECK AND/OR REMOVAL  11/13/2020    IR DRAINAGE TUBE PLACEMENT  10/23/2020    IR DRAINAGE TUBE PLACEMENT  11/27/2020    IR PICC LINE PLACEMENT DOUBLE LUMEN  10/16/2020    [place] LAP,DIAGNOSTIC ABDOMEN N/A 10/12/2020    Procedure: LAPAROSCOPY DIAGNOSTIC, drainage of pelvic abcess, placement drain;  Surgeon: Di Banda MD;  Location: [facility];  Service: General    MA LAP,DIAGNOSTIC ABDOMEN N/A 11/2/2020    Procedure: LAPAROSCOPY DIAGNOSTIC, colostomy, repair of enterotomy;  Surgeon: Di Banda MD;  Location: [facility];  Service: General       Current Outpatient Medications   Medication Sig Dispense Refill    ferrous sulfate 325 (65 Fe) mg tablet Take 325 mg by mouth daily with breakfast      LEVOTHYROXINE SODIUM PO Take 75 mcg by mouth daily       Olmesartan-amLODIPine-HCTZ (TRIBENZOR PO) Take by mouth daily Pt reports dose is 40-10-25mg      potassium chloride (MICRO-K) 10 MEQ CR capsule Take 10 mEq by mouth 2 (two) times a day      SIMVASTATIN PO Take 20 mg by mouth daily       acetaminophen (TYLENOL) 325 mg tablet Take 2 tablets (650 mg total) by mouth every 6 (six) hours as needed for mild pain (Patient not taking: Reported on 11/16/2020) 60 tablet 0    Coenzyme Q10 (CO Q 10 PO) Take by mouth      sodium chloride, PF, 0 9 % Infuse 10 mL into a venous catheter daily Flush each drain with 10cc (1 syringe) daily (Patient not taking: Reported on 11/16/2020) 60 Syringe 3     No current facility-administered medications for this visit  No Known Allergies    Review of Systems negative except as per the HPI    Video Exam    Vitals:    01/25/21 1132   Weight: 74 8 kg (165 lb)   Height: 6' (1 829 m)       Physical Exam  By video awake alert no acute distress skin warm and dry ENT examined neuro grossly intact    I spent  20 minutes directly with the patient during this visit      VIRTUAL VISIT DISCLAIMER    Dwayne Carlson acknowledges that he has consented to an online visit or consultation  He understands that the online visit is based solely on information provided by him, and that, in the absence of a face-to-face physical evaluation by the physician, the diagnosis he receives is both limited and provisional in terms of accuracy and completeness  This is not intended to replace a full medical face-to-face evaluation by the physician  Dwayne Carlson understands and accepts these terms

## 2021-01-25 NOTE — LETTER
January 25, 2021     Carlie Sam DO  [address] [facility],[address]  [facility] [address]  [address] [address]    Patient: Alina Romero   YOB: 1970   Date of Visit: 1/25/2021       Dear Dr Placido Dennis: Thank you for referring Alina Romero to me for evaluation  Below are my notes for this consultation  If you have questions, please do not hesitate to call me  I look forward to following your patient along with you           Sincerely,        Elysia Staples MD        CC: Eduard Lima MD

## 2021-01-27 ENCOUNTER — OFFICE VISIT (OUTPATIENT)
Dept: SURGERY | Facility: CLINIC | Age: 51
End: 2021-01-27

## 2021-01-27 ENCOUNTER — TELEPHONE (OUTPATIENT)
Dept: OTHER | Facility: OTHER | Age: 51
End: 2021-01-27

## 2021-01-27 VITALS — TEMPERATURE: 97.6 F | HEIGHT: 72 IN | BODY MASS INDEX: 21.73 KG/M2 | WEIGHT: 160.4 LBS

## 2021-01-27 DIAGNOSIS — K94.19 INTESTINAL STOMA PROLAPSE (HCC): Primary | ICD-10-CM

## 2021-01-27 PROCEDURE — 99024 POSTOP FOLLOW-UP VISIT: CPT | Performed by: SURGERY

## 2021-01-28 ENCOUNTER — OFFICE VISIT (OUTPATIENT)
Dept: SURGERY | Facility: HOSPITAL | Age: 51
End: 2021-01-28

## 2021-01-28 DIAGNOSIS — Z09 POSTOP CHECK: Primary | ICD-10-CM

## 2021-01-28 PROCEDURE — 99024 POSTOP FOLLOW-UP VISIT: CPT | Performed by: SURGERY

## 2021-01-28 NOTE — TELEPHONE ENCOUNTER
Pt called in and said Stoma came out  Pt was told there may be an 8:45 appt available tomorrow 1/28/21 and would like to get in there if possible  Please call back asap   [phone]

## 2021-02-03 PROBLEM — K94.19 INTESTINAL STOMA PROLAPSE (HCC): Status: ACTIVE | Noted: 2021-02-03

## 2021-02-03 NOTE — ASSESSMENT & PLAN NOTE
Unfortunate the stoma had prolapsed again about 4 in or so  Sugar was applied in the office and this decrease the edema and with gentle pressure the ostomy was reduced  He can follow-up p r n  regarding the ostomy    He is scheduled for colonoscopy in the future followed by reversal

## 2021-02-03 NOTE — PROGRESS NOTES
Assessment/Plan:    Intestinal stoma prolapse (HCC)   Unfortunate the stoma had prolapsed again about 4 in or so  Sugar was applied in the office and this decrease the edema and with gentle pressure the ostomy was reduced  He can follow-up p r n  regarding the ostomy  He is scheduled for colonoscopy in the future followed by reversal        Diagnoses and all orders for this visit:    Intestinal stoma prolapse ([address] )          Subjective:      Patient ID: Tino Moody is a 45 y o  male      HPI        Review of Systems      Objective:      Temp 97 6 °F (36 4 °C)   Ht 6' (1 829 m)   Wt 72 8 kg (160 lb 6 4 oz)   BMI 21 75 kg/m²          Physical Exam

## 2021-02-10 ENCOUNTER — OFFICE VISIT (OUTPATIENT)
Dept: SURGERY | Facility: CLINIC | Age: 51
End: 2021-02-10

## 2021-02-10 VITALS
SYSTOLIC BLOOD PRESSURE: 142 MMHG | TEMPERATURE: 98.3 F | HEART RATE: 109 BPM | WEIGHT: 161 LBS | BODY MASS INDEX: 21.81 KG/M2 | HEIGHT: 72 IN | RESPIRATION RATE: 16 BRPM | DIASTOLIC BLOOD PRESSURE: 88 MMHG

## 2021-02-10 DIAGNOSIS — K94.09 COLOSTOMY PROLAPSE (HCC): ICD-10-CM

## 2021-02-10 DIAGNOSIS — L72.9 CYST OF BUTTOCKS: Primary | ICD-10-CM

## 2021-02-10 PROCEDURE — 99024 POSTOP FOLLOW-UP VISIT: CPT | Performed by: PHYSICIAN ASSISTANT

## 2021-02-11 ENCOUNTER — OFFICE VISIT (OUTPATIENT)
Dept: SURGERY | Facility: HOSPITAL | Age: 51
End: 2021-02-11
Payer: COMMERCIAL

## 2021-02-11 DIAGNOSIS — K94.09 COLOSTOMY PROLAPSE (HCC): Primary | ICD-10-CM

## 2021-02-11 PROCEDURE — 99213 OFFICE O/P EST LOW 20 MIN: CPT | Performed by: SURGERY

## 2021-02-11 NOTE — PATIENT INSTRUCTIONS
Pelvic Tilts          Setup  Begin lying on your back with your knees bent and hands resting on the bottom of your ribs.    Movement  Slowly tilt your pelvis backwards, trying to draw your navel towards your spine and flatten your spine to the ground. Reverse the motion by tilting your pelvis forward. Repeat.    Tip  Make sure to keep your movements slow and controlled and try to keep your stomach still.    Seated Pelvic Tilts        Setup  Begin sitting upright in a chair.    Movement  Gently tilt your pelvis backwards, then return to a neutral position and repeat.    Tip  Repositioning on sit bones  Make sure to keep your upper back relaxed during the exercise and focus the movement just on your pelvis.           Monitor colostomy site  Call with recurrent symptoms, questions or concerns  Otherwise return as scheduled

## 2021-02-11 NOTE — PROGRESS NOTES
Assessment/Plan:   Aaron Canales is a 45 y o male who comes in with complaints of recurrent colostomy stoma prolapse  Patient is status post drainage of intra-abdominal abscesses with diverting colostomy for history of perforated sigmoid colon diverticulitis with multiple intra-abdominal abscesses  Patient has had issues with recurrent stoma prolapse  On exam stoma prolapse of approximately 4 in  Edematous bowel wall  Sugar was applied to the prolapse with resolution of edema and bowel was easily reduced with gentle pressure  Patient tolerated procedure well  Ostomy appliance reapplied  Patient is approximately 7 weeks ou from colostomy procedure  He is scheduled for colonoscopy next week  Will have him return following that procedure to schedule sigmoid colon resection and colostomy reversal   Patient knows to call with any recurrent prolapse, questions or concerns  Otherwise return as scheduled  HPI:  Aaron Canales is a 45 y o male who comes in today for prolapse of colostomy as above  Patient with recurrent prolapse of colostomy site  Denies any abdominal pain  Colostomy remains functional  Frustrated with recurrent prolapse episodes      ROS:  General ROS: negative for - chills, fatigue, fever or night sweats, weight loss  Respiratory ROS: no cough, shortness of breath, or wheezing  Cardiovascular ROS: no chest pain or dyspnea on exertion  Abdomen ROS: as per HPI  Genito-Urinary ROS: no dysuria, trouble voiding, or hematuria  Musculoskeletal ROS: negative for - gait disturbance, joint pain or muscle pain  Neurological ROS: no TIA or stroke symptoms  Skin ROS: no rashes, lesions, open wounds    ALLERGIES  Patient has no known allergies      Current Outpatient Medications:     acetaminophen (TYLENOL) 325 mg tablet, Take 2 tablets (650 mg total) by mouth every 6 (six) hours as needed for mild pain (Patient not taking: Reported on 11/16/2020), Disp: 60 tablet, Rfl: 0    Coenzyme Q10 (CO Q 10 PO), Take by mouth, Disp: , Rfl:     ferrous sulfate 325 (65 Fe) mg tablet, Take 325 mg by mouth daily with breakfast, Disp: , Rfl:     LEVOTHYROXINE SODIUM PO, Take 75 mcg by mouth daily , Disp: , Rfl:     Olmesartan-amLODIPine-HCTZ (TRIBENZOR PO), Take by mouth daily Pt reports dose is 40-10-25mg, Disp: , Rfl:     potassium chloride (MICRO-K) 10 MEQ CR capsule, Take 10 mEq by mouth 2 (two) times a day, Disp: , Rfl:     SIMVASTATIN PO, Take 20 mg by mouth daily , Disp: , Rfl:     sodium chloride, PF, 0 9 %, Infuse 10 mL into a venous catheter daily Flush each drain with 10cc (1 syringe) daily (Patient not taking: Reported on 11/16/2020), Disp: 60 Syringe, Rfl: 3  Past Medical History:   Diagnosis Date    Disease of thyroid gland     Diverticulitis     Hypertension      Past Surgical History:   Procedure Laterality Date    ABDOMINAL ADHESION SURGERY N/A 10/12/2020    Procedure: LYSIS ADHESIONS extensive;  Surgeon: Lynda Ferrera MD;  Location: [facility];  Service: General    COLOSTOMY  11/02/2020    IR DRAINAGE TUBE CHECK AND/OR REMOVAL  11/13/2020    IR DRAINAGE TUBE PLACEMENT  10/23/2020    IR DRAINAGE TUBE PLACEMENT  11/27/2020    IR PICC LINE PLACEMENT DOUBLE LUMEN  10/16/2020    SC LAP,DIAGNOSTIC ABDOMEN N/A 10/12/2020    Procedure: LAPAROSCOPY DIAGNOSTIC, drainage of pelvic abcess, placement drain;  Surgeon: Lynda Ferrera MD;  Location: [facility];  Service: General    SC LAP,DIAGNOSTIC ABDOMEN N/A 11/2/2020    Procedure: LAPAROSCOPY DIAGNOSTIC, colostomy, repair of enterotomy;  Surgeon: Lynda Ferrera MD;  Location: [facility];  Service: General     Family History   Problem Relation Age of Onset    Diabetes Mother     Colon cancer Father     Colon polyps Father     Heart disease Father     Diabetes Father     Diabetes Brother       reports that he has quit smoking  He has never used smokeless tobacco  He reports current alcohol use  He reports current drug use   Frequency: 7 00 times per week  Drug: Marijuana  PHYSICAL EXAM    Vitals:    02/10/21 1539   BP: 142/88   Pulse: (!) 109   Resp: 16   Temp: 98 3 °F (36 8 °C)     General: normal, cooperative, no distress  Abdominal: soft, nondistended or non-tender, few BS  Colostomy stoma with approximate 4 in prolapse with edematous bowel  Stool present in ostomy bag     Incision: clean, dry, and intact and healing well    [employer] ROSAURA Rodriguez

## 2021-02-12 NOTE — PROGRESS NOTES
Seen and examined ostomy prolapsed  AVSS Afebrile  Soft +BS ND prolapsed ostomy    Prolapsed ostomy -reduced in office no issues, f/u as scheduled

## 2021-02-15 ENCOUNTER — TELEPHONE (OUTPATIENT)
Dept: GASTROENTEROLOGY | Facility: CLINIC | Age: 51
End: 2021-02-15

## 2021-02-16 VITALS — WEIGHT: 161 LBS | HEIGHT: 72 IN | BODY MASS INDEX: 21.81 KG/M2

## 2021-02-16 DIAGNOSIS — K57.20 DIVERTICULITIS OF LARGE INTESTINE WITH PERFORATION, UNSPECIFIED BLEEDING STATUS: Primary | ICD-10-CM

## 2021-02-16 RX ORDER — SODIUM PICOSULFATE, MAGNESIUM OXIDE, AND ANHYDROUS CITRIC ACID 10; 3.5; 12 MG/160ML; G/160ML; G/160ML
LIQUID ORAL
Qty: 2 BOTTLE | Refills: 0 | OUTPATIENT
Start: 2021-02-16

## 2021-02-16 NOTE — TELEPHONE ENCOUNTER
Spoke with pt reviewed instructions sent rx to [facility] pharmacy emailed instructions to patient

## 2021-02-16 NOTE — TELEPHONE ENCOUNTER
Dr Whalen pt is scheduled on 02/22/2021  Do you want him to do fleets alone with his bowel prep ?  Please advise

## 2021-02-16 NOTE — TELEPHONE ENCOUNTER
Why does your doctor want you to have this procedure? Perforated diverticulitis     Do you have kidney disease?  no  If yes, are you on dialysis :     Have you had diverticulitis within the past 2 months?no October  perf from divertic     Are you diabetic?  no  If yes, insulin dependent: If yes, provide diabetic instructions sheet     Do take iron supplements? yes  If yes, instruct patient to hold iron supplement for 7 days prior    Are you on a blood thinner? no   Was the blood thinner sheet complete and faxed to cardiologist no  Plavix (clopidogrel), Coumadin (warfarin), Lovenox (enoxaparin), Xarelto (rivaroxaban), Pradaxa(dabigatran), Eliquis(apixaban) Savaysa/Lixiana (edoxapan)    Do you have an automatic implantable cardiac defibrillator (AICD)/pacemaker ([facility])? no  Was AICD/pacemaker sheet completed and faxed to cardiologist? no    Are you on home oxygen? no  If yes, continuous or nocturnal:     Have you been treated for MRSA, VRE or any communicable diseases? no    Heart attack, stroke, or stent within 3 months? no  Schedule at Hospital if within 3-6 months   Use nitroglycerin for chest pain in the last 6 months? no    History of organ  transplant?  no   If yes, notify Endo      History of neck/throat/tongue surgery or cancer? no  IF yes, notify Endo      Any problems with anesthesia in the past? no     Was stool C diff ordered?  no Stool specimen needs to be completed prior to procedure    Do have any facial or body piercings?no     Do you have a latex allergy? no     Do have an allergy to metals? (Bravo study only) no     If pediatric patient, was consent faxed to pediatrician no     Patient rights reviewed yes     I have reviewed the provider's instructions with the patient, answering all questions to his satisfaction  Rx Clenpiq sent to provider for signature  Instructions emailed to patient

## 2021-02-22 ENCOUNTER — ANESTHESIA (OUTPATIENT)
Dept: GASTROENTEROLOGY | Facility: AMBULATORY SURGERY CENTER | Age: 51
End: 2021-02-22

## 2021-02-22 ENCOUNTER — ANESTHESIA EVENT (OUTPATIENT)
Dept: GASTROENTEROLOGY | Facility: AMBULATORY SURGERY CENTER | Age: 51
End: 2021-02-22

## 2021-02-22 ENCOUNTER — HOSPITAL ENCOUNTER (OUTPATIENT)
Dept: GASTROENTEROLOGY | Facility: AMBULATORY SURGERY CENTER | Age: 51
Discharge: HOME | End: 2021-02-22
Payer: COMMERCIAL

## 2021-02-22 VITALS
HEART RATE: 89 BPM | SYSTOLIC BLOOD PRESSURE: 143 MMHG | OXYGEN SATURATION: 99 % | DIASTOLIC BLOOD PRESSURE: 86 MMHG | TEMPERATURE: 98 F | RESPIRATION RATE: 16 BRPM

## 2021-02-22 VITALS — HEART RATE: 93 BPM

## 2021-02-22 DIAGNOSIS — K57.20 DIVERTICULITIS OF LARGE INTESTINE WITH PERFORATION WITHOUT BLEEDING: ICD-10-CM

## 2021-02-22 PROCEDURE — 88305 TISSUE EXAM BY PATHOLOGIST: CPT | Performed by: PATHOLOGY

## 2021-02-22 PROCEDURE — 45385 COLONOSCOPY W/LESION REMOVAL: CPT | Performed by: INTERNAL MEDICINE

## 2021-02-22 RX ORDER — SODIUM CHLORIDE 9 MG/ML
75 INJECTION, SOLUTION INTRAVENOUS CONTINUOUS
Status: DISCONTINUED | OUTPATIENT
Start: 2021-02-22 | End: 2021-02-26 | Stop reason: HOSPADM

## 2021-02-22 RX ORDER — PROPOFOL 10 MG/ML
INJECTION, EMULSION INTRAVENOUS AS NEEDED
Status: DISCONTINUED | OUTPATIENT
Start: 2021-02-22 | End: 2021-02-22

## 2021-02-22 RX ADMIN — PROPOFOL 50 MG: 10 INJECTION, EMULSION INTRAVENOUS at 09:26

## 2021-02-22 RX ADMIN — PROPOFOL 50 MG: 10 INJECTION, EMULSION INTRAVENOUS at 09:21

## 2021-02-22 RX ADMIN — PROPOFOL 80 MG: 10 INJECTION, EMULSION INTRAVENOUS at 09:45

## 2021-02-22 RX ADMIN — PROPOFOL 50 MG: 10 INJECTION, EMULSION INTRAVENOUS at 09:56

## 2021-02-22 RX ADMIN — PROPOFOL 70 MG: 10 INJECTION, EMULSION INTRAVENOUS at 09:38

## 2021-02-22 RX ADMIN — SODIUM CHLORIDE: 9 INJECTION, SOLUTION INTRAVENOUS at 09:58

## 2021-02-22 RX ADMIN — PROPOFOL 50 MG: 10 INJECTION, EMULSION INTRAVENOUS at 09:33

## 2021-02-22 RX ADMIN — PROPOFOL 50 MG: 10 INJECTION, EMULSION INTRAVENOUS at 09:31

## 2021-02-22 RX ADMIN — SODIUM CHLORIDE 75 ML/HR: 9 INJECTION, SOLUTION INTRAVENOUS at 08:55

## 2021-02-22 RX ADMIN — PROPOFOL 150 MG: 10 INJECTION, EMULSION INTRAVENOUS at 09:18

## 2021-02-22 RX ADMIN — PROPOFOL 50 MG: 10 INJECTION, EMULSION INTRAVENOUS at 09:37

## 2021-02-22 NOTE — INTERVAL H&P NOTE
H&P reviewed  After examining the patient I find no changes in the patients condition since the H&P had been written      Vitals:    02/22/21 0844   BP: 160/83   Pulse: (!) 118   Resp: 19   Temp: 98 °F (36 7 °C)   SpO2: 98%

## 2021-02-22 NOTE — DISCHARGE INSTRUCTIONS
Diverticulosis   WHAT YOU NEED TO KNOW:   What is diverticulosis? Diverticulosis is a condition that causes small pockets called diverticula to form in your intestine  These pockets make it difficult for bowel movements to pass through your digestive system  What causes diverticulosis? Diverticula form when muscles have to work hard to move bowel movements through the intestine  The force causes bulges to form at weak areas in the intestine  This may happen if you eat foods that are low in fiber  Fiber helps give your bowel movements more bulk so they are larger and easier to move through your colon  The following may increase your risk of diverticulosis:  · A history of constipation    · Age 36 or older    · Obesity    · Lack of exercise    What are the signs and symptoms of diverticulosis? Diverticulosis usually does not cause any signs or symptoms  It may cause any of the following in some people:  · Pain or discomfort in your lower abdomen    · Abdominal bloating    · Constipation or diarrhea    How is diverticulosis diagnosed? Your healthcare provider will examine you and ask about your bowel movements, diet, and symptoms  He or she will also ask about any medical conditions you have or medicines you take  You may need any of the following:  · Blood tests  may be done to check for signs of inflammation  · A barium enema  is an x-ray of your colon that may show diverticula  A tube is put into your anus, and a liquid called barium is put through the tube  Barium is used so that healthcare providers can see your colon more clearly  · Flexible sigmoidoscopy  is a test to look for any changes in your lower intestines and rectum  It may also show the cause of any bleeding or pain  A soft, bendable tube with a light on the end will be put into your anus  It will then be moved forward into your intestine  · A colonoscopy  is used to look at your whole colon   A scope (long bendable tube with a light on the end) is used to take pictures  This test may show diverticula  · A CT scan , or CAT scan, may show diverticula  You may be given contrast liquid before the scan  Tell the healthcare provider if you have ever had an allergic reaction to contrast liquid  How is diverticulosis managed? The goal of treatment is to manage any symptoms you have and prevent other problems such as diverticulitis  Diverticulitis is swelling or infection of the diverticula  Your healthcare provider may recommend any of the following:  · Eat a variety of high-fiber foods  High-fiber foods help you have regular bowel movements  High-fiber foods include cooked beans, fruits, vegetables, and some cereals  Most adults need 25 to 35 grams of fiber each day  Your healthcare provider may recommend that you have more  Ask your healthcare provider how much fiber you need  Increase fiber slowly  You may have abdominal discomfort, bloating, and gas if you add fiber to your diet too quickly  You may need to take a fiber supplement if you are not getting enough fiber from food  · Medicines  to soften your bowel movements may be given  You may also need medicines to treat symptoms such as bloating and pain  · Drink liquids as directed  You may need to drink 2 to 3 liters (8 to 12 cups) of liquids every day  Ask your healthcare provider how much liquid to drink each day and which liquids are best for you  · Apply heat  on your abdomen for 20 to 30 minutes every 2 hours for as many days as directed  Heat helps decrease pain and muscle spasms  How can I help prevent diverticulitis or other symptoms? The following may help decrease your risk for diverticulitis or symptoms, such as bleeding  Talk to your provider about these or other things you can do to prevent problems that may occur with diverticulosis  · Exercise regularly  Ask your healthcare provider about the best exercise plan for you   Exercise can help you have regular bowel movements  Get 30 minutes of exercise on most days of the week  · Maintain a healthy weight  Ask your healthcare provider how much you should weigh  Ask him or her to help you create a weight loss plan if you are overweight  · Do not smoke  Nicotine and other chemicals in cigarettes increase your risk for diverticulitis  Ask your healthcare provider for information if you currently smoke and need help to quit  E-cigarettes or smokeless tobacco still contain nicotine  Talk to your healthcare provider before you use these products  · Ask your healthcare provider if it is safe to take NSAIDs  NSAIDs may increase your risk of diverticulitis  When should I seek immediate care? · You have severe pain on the left side of your lower abdomen  · Your bowel movements are bright or dark red  When should I contact my healthcare provider? · You have a fever and chills  · You feel dizzy or lightheaded  · You have nausea, or you are vomiting  · You have a change in your bowel movements  · You have questions or concerns about your condition or care  CARE AGREEMENT:   You have the right to help plan your care  Learn about your health condition and how it may be treated  Discuss treatment options with your healthcare providers to decide what care you want to receive  You always have the right to refuse treatment  The above information is an  only  It is not intended as medical advice for individual conditions or treatments  Talk to your doctor, nurse or pharmacist before following any medical regimen to see if it is safe and effective for you  © Copyright [address] Information is for End User's use only and may not be sold, redistributed or otherwise used for commercial purposes   All illustrations and images included in CareNotes® are the copyrighted property of [employer]  or Puneet Vazquez   Colorectal Polyps   WHAT YOU NEED TO KNOW:   What are colorectal polyps? Colorectal polyps are small growths of tissue in the lining of the colon and rectum  Most polyps are hyperplastic polyps and are usually benign (noncancerous)  Certain types of polyps, called adenomatous polyps, may turn into cancer  What increases my risk of colorectal polyps? The exact cause of colorectal polyps is unknown  The following may increase your risk:  · Older age    · A diet of foods high in fat and low in fiber     · Family history of polyps    · Intestinal diseases, such as Crohn's disease or ulcerative colitis    · An unhealthy lifestyle, such as physical inactivity, smoking, or drinking alcohol    · Obesity    What are the signs and symptoms of colorectal polyps? · Blood in your bowel movement or bleeding from the rectum    · Change in bowel movement habits, such as diarrhea and constipation    · Abdominal pain    How are colorectal polyps diagnosed? You should have fecal blood screening once a year for colorectal disease if you are over 48years old  You should be screened earlier if you have an intestinal disease or a family history of polyps or colorectal cancer  During this screening, a sample of your bowel movement is checked for blood, which may be an early sign of colorectal polyps or cancer  You may also need any of the following tests:  · Digital rectal exam:  Your healthcare provider will examine your anus and use a finger to check your rectum for polyps  · Barium enema: A barium enema is an x-ray of the colon  A tube is put into your anus, and a liquid called barium is put through the tube  Barium is used so that healthcare providers can see your colon better on the x-ray film  · Virtual colonoscopy: This is a CT scan that takes pictures of the inside of your colon and rectum  A small, flexible tube is put into your rectum and air or carbon dioxide (gas) is used to expand your colon   This lets healthcare providers clearly see your colon and any polyps on a monitor  · Colonoscopy or sigmoidoscopy: These procedures help your healthcare provider see the inside of your colon using a flexible tube with a small light and camera on the end  During a sigmoidoscopy, your healthcare provider will only look at rectum and lower colon  During a colonoscopy, healthcare providers will look at the full length of your colon  Healthcare providers may remove a small amount of tissue from the colon for a biopsy  How are colorectal polyps treated? A polypectomy is a minimally invasive procedure to remove your polyps  They may be removed during a colonoscopy or sigmoidoscopy  Your healthcare provider may need to remove the polyps with a laparoscope  Laparoscopy is done by inserting a small, flexible scope into incisions made on your abdomen  What are the risks of colorectal polyps? You may bleed during a colonoscopy procedure  Your bowel may be perforated (torn) when polyps are removed  This may lead to an open abdominal surgery  During surgery, you may bleed too much or get an infection  Adenomatous polyps that are not removed may turn into cancer and become more difficult to treat  Where can I find support and more information? · Jeff Smith ([facility]) [address], [place] [address]  Phone: [phone]  Web Address: Zechariah Lechuga  [facility] nih gov    When should I contact my healthcare provider? · You have a fever  · You have chills, a cough, or feel weak and achy  · You have abdominal pain that does not go away or gets worse after you take medicine  · Your abdomen is swollen  · You are losing weight without trying  · You have questions or concerns about your condition or care  When should I seek immediate care or call 911? · You have sudden shortness of breath  · You have a fast heart rate, fast breathing, or are too dizzy to stand up  · You have severe abdominal pain       · You see blood in your bowel movement  CARE AGREEMENT:   You have the right to help plan your care  Learn about your health condition and how it may be treated  Discuss treatment options with your healthcare providers to decide what care you want to receive  You always have the right to refuse treatment  The above information is an  only  It is not intended as medical advice for individual conditions or treatments  Talk to your doctor, nurse or pharmacist before following any medical regimen to see if it is safe and effective for you  © Copyright [address] Information is for End User's use only and may not be sold, redistributed or otherwise used for commercial purposes   All illustrations and images included in CareNotes® are the copyrighted property of [employer]  or [address] [place] [address]

## 2021-02-22 NOTE — ANESTHESIA PREPROCEDURE EVALUATION
Procedure:  COLONOSCOPY    Relevant Problems   CARDIO   (+) HTN (hypertension)   (+) Hyperlipidemia      ENDO   (+) Hypothyroidism        Physical Exam    Airway    Mallampati score: II  TM Distance: >3 FB  Neck ROM: full     Dental   No notable dental hx     Cardiovascular  Cardiovascular exam normal    Pulmonary  Pulmonary exam normal     Other Findings        Anesthesia Plan  ASA Score- 2     Anesthesia Type- IV sedation with anesthesia with ASA Monitors  Additional Monitors:   Airway Plan:           Plan Factors-    Chart reviewed  Patient summary reviewed  Induction- intravenous  Postoperative Plan-     Informed Consent- Anesthetic plan and risks discussed with patient

## 2021-02-22 NOTE — ANESTHESIA POSTPROCEDURE EVALUATION
Post-Op Assessment Note    CV Status:  Stable  Pain Score: 0    Pain management: adequate     Mental Status:  Alert and awake   Hydration Status:  Euvolemic   PONV Controlled:  Controlled   Airway Patency:  Patent      Post Op Vitals Reviewed: Yes      Staff: Anesthesiologist         No complications documented      BP      Temp      Pulse    Resp      SpO2

## 2021-02-25 NOTE — RESULT ENCOUNTER NOTE
I left a voicemail  Polyp benign  Given the polyp as well as the fact that the entire colon was not seen due to his proves to surgical anatomy  One year recall colonoscopy after reanastomosis

## 2021-03-04 ENCOUNTER — OFFICE VISIT (OUTPATIENT)
Dept: SURGERY | Facility: HOSPITAL | Age: 51
End: 2021-03-04
Payer: COMMERCIAL

## 2021-03-04 VITALS
SYSTOLIC BLOOD PRESSURE: 133 MMHG | WEIGHT: 163 LBS | HEART RATE: 137 BPM | RESPIRATION RATE: 16 BRPM | BODY MASS INDEX: 22.08 KG/M2 | DIASTOLIC BLOOD PRESSURE: 89 MMHG | TEMPERATURE: 97.6 F | HEIGHT: 72 IN

## 2021-03-04 DIAGNOSIS — K57.32 SIGMOID DIVERTICULITIS: Primary | ICD-10-CM

## 2021-03-04 DIAGNOSIS — Z93.3 COLOSTOMY IN PLACE (HCC): ICD-10-CM

## 2021-03-04 PROCEDURE — 99213 OFFICE O/P EST LOW 20 MIN: CPT | Performed by: SURGERY

## 2021-03-04 RX ORDER — CEFAZOLIN SODIUM 2 G/50ML
2000 SOLUTION INTRAVENOUS ONCE
Status: CANCELLED | OUTPATIENT
Start: 2021-03-17

## 2021-03-04 RX ORDER — SODIUM CHLORIDE, SODIUM LACTATE, POTASSIUM CHLORIDE, CALCIUM CHLORIDE 600; 310; 30; 20 MG/100ML; MG/100ML; MG/100ML; MG/100ML
125 INJECTION, SOLUTION INTRAVENOUS CONTINUOUS
Status: CANCELLED | OUTPATIENT
Start: 2021-03-17

## 2021-03-04 RX ORDER — GABAPENTIN 300 MG/1
300 CAPSULE ORAL ONCE
Status: CANCELLED | OUTPATIENT
Start: 2021-03-17

## 2021-03-04 RX ORDER — CHLORHEXIDINE GLUCONATE 0.12 MG/ML
15 RINSE ORAL ONCE
Status: CANCELLED | OUTPATIENT
Start: 2021-03-17

## 2021-03-04 RX ORDER — ACETAMINOPHEN 325 MG/1
975 TABLET ORAL ONCE
Status: CANCELLED | OUTPATIENT
Start: 2021-03-17

## 2021-03-08 ENCOUNTER — OFFICE VISIT (OUTPATIENT)
Dept: LAB | Facility: HOSPITAL | Age: 51
End: 2021-03-08
Attending: SURGERY
Payer: COMMERCIAL

## 2021-03-08 ENCOUNTER — HOSPITAL ENCOUNTER (OUTPATIENT)
Dept: RADIOLOGY | Facility: HOSPITAL | Age: 51
Discharge: HOME | End: 2021-03-08
Attending: SURGERY
Payer: COMMERCIAL

## 2021-03-08 ENCOUNTER — TRANSCRIBE ORDERS (OUTPATIENT)
Dept: ADMINISTRATIVE | Facility: HOSPITAL | Age: 51
End: 2021-03-08

## 2021-03-08 ENCOUNTER — LAB (OUTPATIENT)
Dept: LAB | Facility: HOSPITAL | Age: 51
End: 2021-03-08
Attending: SURGERY
Payer: COMMERCIAL

## 2021-03-08 DIAGNOSIS — K57.32 DIVERTICULITIS OF COLON (WITHOUT MENTION OF HEMORRHAGE)(562.11): ICD-10-CM

## 2021-03-08 DIAGNOSIS — Z93.3 COLOSTOMY STATUS (HCC): ICD-10-CM

## 2021-03-08 DIAGNOSIS — Z93.3 COLOSTOMY STATUS (HCC): Primary | ICD-10-CM

## 2021-03-08 DIAGNOSIS — K57.32 SIGMOID DIVERTICULITIS: ICD-10-CM

## 2021-03-08 DIAGNOSIS — Z93.3 COLOSTOMY IN PLACE (HCC): ICD-10-CM

## 2021-03-08 LAB
ANION GAP SERPL CALCULATED.3IONS-SCNC: 5 MMOL/L (ref 4–13)
APTT PPP: 31 SECONDS (ref 23–37)
BASOPHILS # BLD AUTO: 0.06 THOUSANDS/ΜL (ref 0–0.1)
BASOPHILS NFR BLD AUTO: 1 % (ref 0–1)
BUN SERPL-MCNC: 14 MG/DL (ref 5–25)
CALCIUM SERPL-MCNC: 9.1 MG/DL (ref 8.3–10.1)
CHLORIDE SERPL-SCNC: 100 MMOL/L (ref 100–108)
CO2 SERPL-SCNC: 28 MMOL/L (ref 21–32)
CREAT SERPL-MCNC: 0.88 MG/DL (ref 0.6–1.3)
EOSINOPHIL # BLD AUTO: 0.46 THOUSAND/ΜL (ref 0–0.61)
EOSINOPHIL NFR BLD AUTO: 5 % (ref 0–6)
ERYTHROCYTE [DISTWIDTH] IN BLOOD BY AUTOMATED COUNT: 13.6 % (ref 11.6–15.1)
EST. AVERAGE GLUCOSE BLD GHB EST-MCNC: 114 MG/DL
GFR SERPL CREATININE-BSD FRML MDRD: 99 ML/MIN/1.73SQ M
GLUCOSE P FAST SERPL-MCNC: 159 MG/DL (ref 65–99)
HBA1C MFR BLD: 5.6 %
HCT VFR BLD AUTO: 42.8 % (ref 36.5–49.3)
HGB BLD-MCNC: 14.6 G/DL (ref 12–17)
IMM GRANULOCYTES # BLD AUTO: 0.08 THOUSAND/UL (ref 0–0.2)
IMM GRANULOCYTES NFR BLD AUTO: 1 % (ref 0–2)
INR PPP: 0.97 (ref 0.84–1.19)
LYMPHOCYTES # BLD AUTO: 2.3 THOUSANDS/ΜL (ref 0.6–4.47)
LYMPHOCYTES NFR BLD AUTO: 27 % (ref 14–44)
MCH RBC QN AUTO: 31.1 PG (ref 26.8–34.3)
MCHC RBC AUTO-ENTMCNC: 34.1 G/DL (ref 31.4–37.4)
MCV RBC AUTO: 91 FL (ref 82–98)
MONOCYTES # BLD AUTO: 0.8 THOUSAND/ΜL (ref 0.17–1.22)
MONOCYTES NFR BLD AUTO: 9 % (ref 4–12)
NEUTROPHILS # BLD AUTO: 4.95 THOUSANDS/ΜL (ref 1.85–7.62)
NEUTS SEG NFR BLD AUTO: 57 % (ref 43–75)
NRBC BLD AUTO-RTO: 0 /100 WBCS
PLATELET # BLD AUTO: 383 THOUSANDS/UL (ref 149–390)
PMV BLD AUTO: 8.7 FL (ref 8.9–12.7)
POTASSIUM SERPL-SCNC: 3.8 MMOL/L (ref 3.5–5.3)
PROTHROMBIN TIME: 12.9 SECONDS (ref 11.6–14.5)
RBC # BLD AUTO: 4.7 MILLION/UL (ref 3.88–5.62)
SODIUM SERPL-SCNC: 133 MMOL/L (ref 136–145)
WBC # BLD AUTO: 8.65 THOUSAND/UL (ref 4.31–10.16)

## 2021-03-08 PROCEDURE — 36415 COLL VENOUS BLD VENIPUNCTURE: CPT

## 2021-03-08 PROCEDURE — 93005 ELECTROCARDIOGRAM TRACING: CPT

## 2021-03-08 PROCEDURE — 85025 COMPLETE CBC W/AUTO DIFF WBC: CPT

## 2021-03-08 PROCEDURE — 86850 RBC ANTIBODY SCREEN: CPT | Performed by: SURGERY

## 2021-03-08 PROCEDURE — 83036 HEMOGLOBIN GLYCOSYLATED A1C: CPT

## 2021-03-08 PROCEDURE — 86901 BLOOD TYPING SEROLOGIC RH(D): CPT | Performed by: SURGERY

## 2021-03-08 PROCEDURE — 85730 THROMBOPLASTIN TIME PARTIAL: CPT

## 2021-03-08 PROCEDURE — 86900 BLOOD TYPING SEROLOGIC ABO: CPT | Performed by: SURGERY

## 2021-03-08 PROCEDURE — 80048 BASIC METABOLIC PNL TOTAL CA: CPT

## 2021-03-08 PROCEDURE — 85610 PROTHROMBIN TIME: CPT

## 2021-03-08 PROCEDURE — 71046 X-RAY EXAM CHEST 2 VIEWS: CPT

## 2021-03-09 LAB
ABO GROUP BLD: NORMAL
ATRIAL RATE: 118 BPM
BLD GP AB SCN SERPL QL: NEGATIVE
P AXIS: 82 DEGREES
PR INTERVAL: 168 MS
QRS AXIS: -65 DEGREES
QRSD INTERVAL: 108 MS
QT INTERVAL: 312 MS
QTC INTERVAL: 437 MS
RH BLD: POSITIVE
SPECIMEN EXPIRATION DATE: NORMAL
T WAVE AXIS: 78 DEGREES
VENTRICULAR RATE: 118 BPM

## 2021-03-09 PROCEDURE — 93010 ELECTROCARDIOGRAM REPORT: CPT | Performed by: INTERNAL MEDICINE

## 2021-03-10 DIAGNOSIS — Z93.3 COLOSTOMY IN PLACE (HCC): ICD-10-CM

## 2021-03-10 DIAGNOSIS — K57.32 SIGMOID DIVERTICULITIS: ICD-10-CM

## 2021-03-10 PROCEDURE — U0003 INFECTIOUS AGENT DETECTION BY NUCLEIC ACID (DNA OR RNA); SEVERE ACUTE RESPIRATORY SYNDROME CORONAVIRUS 2 (SARS-COV-2) (CORONAVIRUS DISEASE [COVID-19]), AMPLIFIED PROBE TECHNIQUE, MAKING USE OF HIGH THROUGHPUT TECHNOLOGIES AS DESCRIBED BY CMS-2020-01-R: HCPCS | Performed by: SURGERY

## 2021-03-10 PROCEDURE — U0005 INFEC AGEN DETEC AMPLI PROBE: HCPCS | Performed by: SURGERY

## 2021-03-11 LAB — SARS-COV-2 RNA RESP QL NAA+PROBE: NEGATIVE

## 2021-03-12 RX ORDER — GABAPENTIN 300 MG/1
300 CAPSULE ORAL ONCE
Status: CANCELLED | OUTPATIENT
Start: 2021-03-17 | End: 2021-03-17

## 2021-03-12 RX ORDER — CHLORHEXIDINE GLUCONATE 0.12 MG/ML
15 RINSE ORAL ONCE
Status: CANCELLED | OUTPATIENT
Start: 2021-03-17 | End: 2021-03-17

## 2021-03-12 RX ORDER — CEFAZOLIN SODIUM 2 G/50ML
2000 SOLUTION INTRAVENOUS ONCE
Status: CANCELLED | OUTPATIENT
Start: 2021-03-17

## 2021-03-12 RX ORDER — SODIUM CHLORIDE, SODIUM LACTATE, POTASSIUM CHLORIDE, CALCIUM CHLORIDE 600; 310; 30; 20 MG/100ML; MG/100ML; MG/100ML; MG/100ML
125 INJECTION, SOLUTION INTRAVENOUS CONTINUOUS
Status: CANCELLED | OUTPATIENT
Start: 2021-03-17

## 2021-03-12 RX ORDER — ACETAMINOPHEN 325 MG/1
975 TABLET ORAL ONCE
Status: CANCELLED | OUTPATIENT
Start: 2021-03-17 | End: 2021-03-17

## 2021-03-12 NOTE — PROGRESS NOTES
Assessment/Plan:  Richard Turpin is a 45 y o male who was referred for evaluation of Follow-up Patient returns to followup on colonoscopy and to schdeule colostomy reversal  Had diagnostic lap with colostomy done 10/12/20 for diverticulitis  Plan: Complicated diverticulitis with right sided colostomy - discussed operative vs conservative mgt, surgical approaches, risks and benefits, discussed that in case given the complexity of his original operation we will attempt laparoscopy although he may require open surgery  In addition given the amount of adhesions we may just perform a sigmoid resection and leave his colostomy in place and he may need a future surgery for colostomy reversal or if the adhesions are too dense in the pelvis we may consider only reversing his colostomy and these decision will only be made intraoperatively  I will schedule him at his earliest convenience  Preoperative Clearance: None    HPI:  Richard Turpin is a 45 y o male who was referred for evaluation of Follow-up Patient returns to followup on colonoscopy and to schdeule colostomy reversal  Had diagnostic lap with colostomy done 10/12/20 for diverticulitis  Currently no new symptoms of pain although has had recurrent episodes of prolapse of his ostomy  ROS:  General ROS: negative  negative for - chills, fatigue, fever or night sweats, weight loss  Respiratory ROS: no cough, shortness of breath, or wheezing  Cardiovascular ROS: no chest pain or dyspnea on exertion  Genito-Urinary ROS: no dysuria, trouble voiding, or hematuria  Musculoskeletal ROS: negative for - gait disturbance, joint pain or muscle pain  Neurological ROS: no TIA or stroke symptoms  Occasional prolapse of ostomy    [unfilled]  Patient has no known allergies      Current Outpatient Medications:     LEVOTHYROXINE SODIUM PO, Take 75 mcg by mouth daily , Disp: , Rfl:     Olmesartan-amLODIPine-HCTZ (TRIBENZOR PO), Take by mouth every evening Pt reports dose is 40-10-25mg, Disp: , Rfl:     potassium chloride (MICRO-K) 10 MEQ CR capsule, Take 10 mEq by mouth 2 (two) times a day, Disp: , Rfl:     SIMVASTATIN PO, Take 20 mg by mouth every evening , Disp: , Rfl:     Coenzyme Q10 (CO Q 10 PO), Take by mouth, Disp: , Rfl:     ferrous sulfate 325 (65 Fe) mg tablet, Take 325 mg by mouth daily with breakfast, Disp: , Rfl:   Past Medical History:   Diagnosis Date    Disease of thyroid gland     Diverticulitis     Hypertension      Past Surgical History:   Procedure Laterality Date    ABDOMINAL ADHESION SURGERY N/A 10/12/2020    Procedure: LYSIS ADHESIONS extensive;  Surgeon: Stevo Hurtado MD;  Location: [facility];  Service: General    COLON SURGERY      COLOSTOMY  11/02/2020    COLOSTOMY      IR DRAINAGE TUBE CHECK AND/OR REMOVAL  11/13/2020    IR DRAINAGE TUBE PLACEMENT  10/23/2020    IR DRAINAGE TUBE PLACEMENT  11/27/2020    IR PICC LINE PLACEMENT DOUBLE LUMEN  10/16/2020    LA LAP,DIAGNOSTIC ABDOMEN N/A 10/12/2020    Procedure: LAPAROSCOPY DIAGNOSTIC, drainage of pelvic abcess, placement drain;  Surgeon: Stevo Hurtado MD;  Location: [facility];  Service: General    LA LAP,DIAGNOSTIC ABDOMEN N/A 11/2/2020    Procedure: LAPAROSCOPY DIAGNOSTIC, colostomy, repair of enterotomy;  Surgeon: Stevo Hurtado MD;  Location: [facility];  Service: General     Family History   Problem Relation Age of Onset    Diabetes Mother     Colon cancer Father     Colon polyps Father     Heart disease Father     Diabetes Father     Diabetes Brother       reports that he has quit smoking  He has never used smokeless tobacco  He reports current alcohol use  He reports current drug use  Frequency: 7 00 times per week  Drug: Marijuana      Labs:   Lab Results   Component Value Date    WBC 8 65 03/08/2021    WBC 18 05 (H) 11/07/2020    WBC 15 72 (H) 11/06/2020    HGB 14 6 03/08/2021    HGB 9 4 (L) 11/07/2020    HGB 9 1 (L) 11/06/2020     03/08/2021     (H) 11/07/2020     (H) 11/06/2020     Lab Results   Component Value Date    ALT 16 10/27/2020    ALT 12 10/26/2020    ALT 9 (L) 10/17/2020    AST 14 10/27/2020    AST 12 10/26/2020    AST 11 10/17/2020     This SmartLink has not been configured with any valid records  PHYSICAL EXAM  General Appearance:    Alert, cooperative, no distress,   Head:    Normocephalic without obvious abnormality   Eyes:    PERRL, conjunctiva/corneas clear, EOM's intact        Neck:   Supple, no adenopathy, no JVD   Back:     Symmetric, no spinal or CVA tenderness   Lungs:     Clear to auscultation bilaterally, no wheezing or rhonchi   Heart:    Regular rate and rhythm, S1 and S2 normal, no murmur   Abdomen:     Soft +BS ND NT ostomy intact   Extremities:   Extremities normal  No clubbing, cyanosis or edema   Psych:   Normal Affect, AOx3  Neurologic:  Skin:   CNII-XII intact  Strength symmetric, speech intact    Warm, dry, intact, no visible rashes or lesions         Physical Exam              Some portions of this record may have been generated with voice recognition software  There may be translation, syntax,  or grammatical errors  Occasional wrong word or "sound-a-like" substitutions may have occurred due to the inherent limitations of the voice recognition software  Read the chart carefully and recognize, using context, where substitutions may have occurred  If you have any questions, please contact the dictating provider for clarification or correction, as needed  This encounter has been coded by a non-certified coder

## 2021-03-12 NOTE — PROGRESS NOTES
Assessment/Plan:   Lynnea Sacks is a 45 y o male who is here for Other (Established patient who returns today  Stoma reprolapsed yesterday 3 hours after being seen for same issue yesterday in [facility] office  Was self taught today by Rosetta Fernández how to push stoma back in should it come out again  Followup PRN  )    Plan: Ostomy prolapse - reduced in the office without incidence, bag replaced, f/u in few weeks    Preoperative Clearance: None    HPI:  Lynnea Sacks is a 45 y o male who was referred for evaluation of Other (Established patient who returns today  Stoma reprolapsed yesterday 3 hours after being seen for same issue yesterday in [facility] office  Was self taught today by Rosetta Fernández how to push stoma back in should it come out again  Followup PRN  )    Currently having issues with prolapse of ostomy  ROS:  General ROS: negative  negative for - chills, fatigue, fever or night sweats, weight loss  Respiratory ROS: no cough, shortness of breath, or wheezing  Cardiovascular ROS: no chest pain or dyspnea on exertion  Genito-Urinary ROS: no dysuria, trouble voiding, or hematuria  Musculoskeletal ROS: negative for - gait disturbance, joint pain or muscle pain  Neurological ROS: no TIA or stroke symptoms  Prolapse of ostomy    [unfilled]  Patient has no known allergies      Current Outpatient Medications:     Coenzyme Q10 (CO Q 10 PO), Take by mouth, Disp: , Rfl:     ferrous sulfate 325 (65 Fe) mg tablet, Take 325 mg by mouth daily with breakfast, Disp: , Rfl:     LEVOTHYROXINE SODIUM PO, Take 75 mcg by mouth daily , Disp: , Rfl:     Olmesartan-amLODIPine-HCTZ (TRIBENZOR PO), Take by mouth every evening Pt reports dose is 40-10-25mg, Disp: , Rfl:     potassium chloride (MICRO-K) 10 MEQ CR capsule, Take 10 mEq by mouth 2 (two) times a day, Disp: , Rfl:     SIMVASTATIN PO, Take 20 mg by mouth every evening , Disp: , Rfl:   Past Medical History:   Diagnosis Date    Disease of thyroid gland     Diverticulitis     Hypertension      Past Surgical History:   Procedure Laterality Date    ABDOMINAL ADHESION SURGERY N/A 10/12/2020    Procedure: LYSIS ADHESIONS extensive;  Surgeon: Hakan Roawn MD;  Location: [facility];  Service: General    COLON SURGERY      COLOSTOMY  11/02/2020    COLOSTOMY      IR DRAINAGE TUBE CHECK AND/OR REMOVAL  11/13/2020    IR DRAINAGE TUBE PLACEMENT  10/23/2020    IR DRAINAGE TUBE PLACEMENT  11/27/2020    IR PICC LINE PLACEMENT DOUBLE LUMEN  10/16/2020    NM LAP,DIAGNOSTIC ABDOMEN N/A 10/12/2020    Procedure: LAPAROSCOPY DIAGNOSTIC, drainage of pelvic abcess, placement drain;  Surgeon: Hakan Rowan MD;  Location: [facility];  Service: General    NM LAP,DIAGNOSTIC ABDOMEN N/A 11/2/2020    Procedure: LAPAROSCOPY DIAGNOSTIC, colostomy, repair of enterotomy;  Surgeon: Hakan Rowan MD;  Location: [facility];  Service: General     Family History   Problem Relation Age of Onset    Diabetes Mother     Colon cancer Father     Colon polyps Father     Heart disease Father     Diabetes Father     Diabetes Brother       reports that he has quit smoking  He has never used smokeless tobacco  He reports current alcohol use  He reports current drug use  Frequency: 7 00 times per week  Drug: Marijuana  Labs:   Lab Results   Component Value Date    WBC 8 65 03/08/2021    WBC 18 05 (H) 11/07/2020    WBC 15 72 (H) 11/06/2020    HGB 14 6 03/08/2021    HGB 9 4 (L) 11/07/2020    HGB 9 1 (L) 11/06/2020     03/08/2021     (H) 11/07/2020     (H) 11/06/2020     Lab Results   Component Value Date    ALT 16 10/27/2020    ALT 12 10/26/2020    ALT 9 (L) 10/17/2020    AST 14 10/27/2020    AST 12 10/26/2020    AST 11 10/17/2020     This SmartLink has not been configured with any valid records         PHYSICAL EXAM  General Appearance:    Alert, cooperative, no distress,   Head:    Normocephalic without obvious abnormality   Eyes:    PERRL, conjunctiva/corneas clear, EOM's intact        Neck:   Supple, no adenopathy, no JVD   Back:     Symmetric, no spinal or CVA tenderness   Lungs:     Clear to auscultation bilaterally, no wheezing or rhonchi   Heart:    Regular rate and rhythm, S1 and S2 normal, no murmur   Abdomen:     Soft +BS ND TTP ostomy prolapsed and reduced without issue   Extremities:   Extremities normal  No clubbing, cyanosis or edema   Psych:   Normal Affect, AOx3  Neurologic:  Skin:   CNII-XII intact  Strength symmetric, speech intact    Warm, dry, intact, no visible rashes or lesions         Physical Exam              Some portions of this record may have been generated with voice recognition software  There may be translation, syntax,  or grammatical errors  Occasional wrong word or "sound-a-like" substitutions may have occurred due to the inherent limitations of the voice recognition software  Read the chart carefully and recognize, using context, where substitutions may have occurred  If you have any questions, please contact the dictating provider for clarification or correction, as needed  This encounter has been coded by a non-certified coder

## 2021-03-12 NOTE — H&P (VIEW-ONLY)
Assessment/Plan:  Xochitl Manuel is a 45 y o male who was referred for evaluation of Follow-up Patient returns to followup on colonoscopy and to schdeule colostomy reversal  Had diagnostic lap with colostomy done 10/12/20 for diverticulitis  Plan: Complicated diverticulitis with right sided colostomy - discussed operative vs conservative mgt, surgical approaches, risks and benefits, discussed that in case given the complexity of his original operation we will attempt laparoscopy although he may require open surgery  In addition given the amount of adhesions we may just perform a sigmoid resection and leave his colostomy in place and he may need a future surgery for colostomy reversal or if the adhesions are too dense in the pelvis we may consider only reversing his colostomy and these decision will only be made intraoperatively  I will schedule him at his earliest convenience  Preoperative Clearance: None    HPI:  Xochitl Manuel is a 45 y o male who was referred for evaluation of Follow-up Patient returns to followup on colonoscopy and to schdeule colostomy reversal  Had diagnostic lap with colostomy done 10/12/20 for diverticulitis  Currently no new symptoms of pain although has had recurrent episodes of prolapse of his ostomy  ROS:  General ROS: negative  negative for - chills, fatigue, fever or night sweats, weight loss  Respiratory ROS: no cough, shortness of breath, or wheezing  Cardiovascular ROS: no chest pain or dyspnea on exertion  Genito-Urinary ROS: no dysuria, trouble voiding, or hematuria  Musculoskeletal ROS: negative for - gait disturbance, joint pain or muscle pain  Neurological ROS: no TIA or stroke symptoms  Occasional prolapse of ostomy    [unfilled]  Patient has no known allergies      Current Outpatient Medications:     LEVOTHYROXINE SODIUM PO, Take 75 mcg by mouth daily , Disp: , Rfl:     Olmesartan-amLODIPine-HCTZ (TRIBENZOR PO), Take by mouth every evening Pt reports dose is 40-10-25mg, Disp: , Rfl:     potassium chloride (MICRO-K) 10 MEQ CR capsule, Take 10 mEq by mouth 2 (two) times a day, Disp: , Rfl:     SIMVASTATIN PO, Take 20 mg by mouth every evening , Disp: , Rfl:     Coenzyme Q10 (CO Q 10 PO), Take by mouth, Disp: , Rfl:     ferrous sulfate 325 (65 Fe) mg tablet, Take 325 mg by mouth daily with breakfast, Disp: , Rfl:   Past Medical History:   Diagnosis Date    Disease of thyroid gland     Diverticulitis     Hypertension      Past Surgical History:   Procedure Laterality Date    ABDOMINAL ADHESION SURGERY N/A 10/12/2020    Procedure: LYSIS ADHESIONS extensive;  Surgeon: Stevo Hurtado MD;  Location: [facility];  Service: General    COLON SURGERY      COLOSTOMY  11/02/2020    COLOSTOMY      IR DRAINAGE TUBE CHECK AND/OR REMOVAL  11/13/2020    IR DRAINAGE TUBE PLACEMENT  10/23/2020    IR DRAINAGE TUBE PLACEMENT  11/27/2020    IR PICC LINE PLACEMENT DOUBLE LUMEN  10/16/2020    DE LAP,DIAGNOSTIC ABDOMEN N/A 10/12/2020    Procedure: LAPAROSCOPY DIAGNOSTIC, drainage of pelvic abcess, placement drain;  Surgeon: Stevo Hurtado MD;  Location: [facility];  Service: General    DE LAP,DIAGNOSTIC ABDOMEN N/A 11/2/2020    Procedure: LAPAROSCOPY DIAGNOSTIC, colostomy, repair of enterotomy;  Surgeon: Stevo Hurtado MD;  Location: [facility];  Service: General     Family History   Problem Relation Age of Onset    Diabetes Mother     Colon cancer Father     Colon polyps Father     Heart disease Father     Diabetes Father     Diabetes Brother       reports that he has quit smoking  He has never used smokeless tobacco  He reports current alcohol use  He reports current drug use  Frequency: 7 00 times per week  Drug: Marijuana      Labs:   Lab Results   Component Value Date    WBC 8 65 03/08/2021    WBC 18 05 (H) 11/07/2020    WBC 15 72 (H) 11/06/2020    HGB 14 6 03/08/2021    HGB 9 4 (L) 11/07/2020    HGB 9 1 (L) 11/06/2020     03/08/2021     (H) 11/07/2020     (H) 11/06/2020     Lab Results   Component Value Date    ALT 16 10/27/2020    ALT 12 10/26/2020    ALT 9 (L) 10/17/2020    AST 14 10/27/2020    AST 12 10/26/2020    AST 11 10/17/2020     This SmartLink has not been configured with any valid records  PHYSICAL EXAM  General Appearance:    Alert, cooperative, no distress,   Head:    Normocephalic without obvious abnormality   Eyes:    PERRL, conjunctiva/corneas clear, EOM's intact        Neck:   Supple, no adenopathy, no JVD   Back:     Symmetric, no spinal or CVA tenderness   Lungs:     Clear to auscultation bilaterally, no wheezing or rhonchi   Heart:    Regular rate and rhythm, S1 and S2 normal, no murmur   Abdomen:     Soft +BS ND NT ostomy intact   Extremities:   Extremities normal  No clubbing, cyanosis or edema   Psych:   Normal Affect, AOx3  Neurologic:  Skin:   CNII-XII intact  Strength symmetric, speech intact    Warm, dry, intact, no visible rashes or lesions         Physical Exam              Some portions of this record may have been generated with voice recognition software  There may be translation, syntax,  or grammatical errors  Occasional wrong word or "sound-a-like" substitutions may have occurred due to the inherent limitations of the voice recognition software  Read the chart carefully and recognize, using context, where substitutions may have occurred  If you have any questions, please contact the dictating provider for clarification or correction, as needed  This encounter has been coded by a non-certified coder

## 2021-03-12 NOTE — PRE-PROCEDURE INSTRUCTIONS
Pre-Surgery Instructions:   Medication Instructions    LEVOTHYROXINE SODIUM PO Instructed patient per Anesthesia Guidelines  continue, take dos     Olmesartan-amLODIPine-HCTZ (TRIBENZOR PO) Instructed patient per Anesthesia Guidelines  continue, take evening before sx as usual    potassium chloride (MICRO-K) 10 MEQ CR capsule Instructed patient per Anesthesia Guidelines  continue, do not take dos    SIMVASTATIN PO Instructed patient per Anesthesia Guidelines  continue, take evening before sx as usual    ACE/ARB Med Class    Continue this medication up to the evening before surgery/procedure, but do not take the morning of the day of surgery  Calcium Channel Blocker Med Class    Continue to take this heart medication on your normal schedule  If this is an oral medication and you take it in the morning, then you may take this medicine with a sip of water  Herbal Med Class    Stop taking this herbal medications at least one week prior to surgery/procedure  Statin Med Class    Continue to take this medication on your normal schedule  If this is an oral medication and you take it in the morning, then you may take this medicine with a sip of water  Thyroxine Med Class    Continue to take this medication on your normal schedule  If this is an oral medication and you take it in the morning, then you may take this medicine with a sip of water  You will receive a phone call from hospital for arrival time  Please call surgeons office if any changes in your condition  Wear easy on/off clothing; consider type of surgery;  Valuables, jewelry, piercing's please keep at home  **COVID-19  education/surgical guidelines      Please: No contact lenses or eye make up, artificial eyelashes    Please bring special ordered sling or braces if needed for  Your particular surgery  n/a    Please secure transportation     Follow pre surgery showering or cleaning instructions as  Reviewed by nurse or surgeons office   Patient will avoid stoma    Questions answered and concerns addressed

## 2021-03-15 ENCOUNTER — TRANSCRIBE ORDERS (OUTPATIENT)
Dept: ADMINISTRATIVE | Facility: HOSPITAL | Age: 51
End: 2021-03-15

## 2021-03-15 DIAGNOSIS — Z93.3 COLOSTOMY STATUS (HCC): Primary | ICD-10-CM

## 2021-03-16 ENCOUNTER — APPOINTMENT (INPATIENT)
Dept: LAB | Facility: CLINIC | Age: 51
DRG: 331 | End: 2021-03-16
Payer: COMMERCIAL

## 2021-03-16 ENCOUNTER — TRANSCRIBE ORDERS (OUTPATIENT)
Dept: LAB | Facility: HOSPITAL | Age: 51
End: 2021-03-16

## 2021-03-16 DIAGNOSIS — K57.32 DIVERTICULITIS OF COLON (WITHOUT MENTION OF HEMORRHAGE)(562.11): Primary | ICD-10-CM

## 2021-03-16 LAB
BACTERIA UR QL AUTO: ABNORMAL /HPF
BILIRUB UR QL STRIP: NEGATIVE
CLARITY UR: ABNORMAL
COLOR UR: ABNORMAL
GLUCOSE UR STRIP-MCNC: NEGATIVE MG/DL
HGB UR QL STRIP.AUTO: NEGATIVE
HYALINE CASTS #/AREA URNS LPF: ABNORMAL /LPF
KETONES UR STRIP-MCNC: NEGATIVE MG/DL
LEUKOCYTE ESTERASE UR QL STRIP: NEGATIVE
NITRITE UR QL STRIP: NEGATIVE
NON-SQ EPI CELLS URNS QL MICRO: ABNORMAL /HPF
PH UR STRIP.AUTO: 6 [PH]
PROT UR STRIP-MCNC: ABNORMAL MG/DL
RBC #/AREA URNS AUTO: ABNORMAL /HPF
SP GR UR STRIP.AUTO: 1.03 (ref 1–1.03)
UROBILINOGEN UR QL STRIP.AUTO: 0.2 E.U./DL
WBC #/AREA URNS AUTO: ABNORMAL /HPF

## 2021-03-16 PROCEDURE — 81001 URINALYSIS AUTO W/SCOPE: CPT

## 2021-03-17 ENCOUNTER — ANESTHESIA EVENT (OUTPATIENT)
Dept: PERIOP | Facility: HOSPITAL | Age: 51
DRG: 331 | End: 2021-03-17
Payer: COMMERCIAL

## 2021-03-17 ENCOUNTER — ANESTHESIA (OUTPATIENT)
Dept: PERIOP | Facility: HOSPITAL | Age: 51
DRG: 331 | End: 2021-03-17
Payer: COMMERCIAL

## 2021-03-17 ENCOUNTER — HOSPITAL ENCOUNTER (INPATIENT)
Facility: HOSPITAL | Age: 51
LOS: 2 days | Discharge: HOME | DRG: 331 | End: 2021-03-19
Attending: SURGERY | Admitting: SURGERY
Payer: COMMERCIAL

## 2021-03-17 DIAGNOSIS — Z90.49 S/P LEFT COLECTOMY: ICD-10-CM

## 2021-03-17 DIAGNOSIS — Z90.49 S/P PARTIAL COLECTOMY: Primary | ICD-10-CM

## 2021-03-17 DIAGNOSIS — K57.32 SIGMOID DIVERTICULITIS: ICD-10-CM

## 2021-03-17 DIAGNOSIS — Z93.3 COLOSTOMY IN PLACE (HCC): ICD-10-CM

## 2021-03-17 LAB
ABO GROUP BLD: NORMAL
RH BLD: POSITIVE

## 2021-03-17 PROCEDURE — 88307 TISSUE EXAM BY PATHOLOGIST: CPT | Performed by: PATHOLOGY

## 2021-03-17 PROCEDURE — 0WUF0JZ SUPPLEMENT ABDOMINAL WALL WITH SYNTHETIC SUBSTITUTE, OPEN APPROACH: ICD-10-PCS | Performed by: SURGERY

## 2021-03-17 PROCEDURE — 49568 PR IMPLANT MESH HERNIA REPAIR/DEBRIDEMENT CLOSURE: CPT | Performed by: SURGERY

## 2021-03-17 PROCEDURE — 0DJD8ZZ INSPECTION OF LOWER INTESTINAL TRACT, VIA NATURAL OR ARTIFICIAL OPENING ENDOSCOPIC: ICD-10-PCS | Performed by: SURGERY

## 2021-03-17 PROCEDURE — 44207 L COLECTOMY/COLOPROCTOSTOMY: CPT | Performed by: SURGERY

## 2021-03-17 PROCEDURE — C9290 INJ, BUPIVACAINE LIPOSOME: HCPCS | Performed by: SURGERY

## 2021-03-17 PROCEDURE — C1781 MESH (IMPLANTABLE): HCPCS | Performed by: SURGERY

## 2021-03-17 PROCEDURE — 0DTNFZZ RESECTION OF SIGMOID COLON, VIA NATURAL OR ARTIFICIAL OPENING WITH PERCUTANEOUS ENDOSCOPIC ASSISTANCE: ICD-10-PCS | Performed by: SURGERY

## 2021-03-17 PROCEDURE — 49560 PR REPAIR INCISIONAL HERNIA,REDUCIBLE: CPT | Performed by: SURGERY

## 2021-03-17 PROCEDURE — 44213 LAP MOBIL SPLENIC FL ADD-ON: CPT | Performed by: SURGERY

## 2021-03-17 DEVICE — OVITEX™ 1S REINFORCED TISSUE MATRIX WITH PERMANENT POLYMER (OVITEX 1S) IS A STERILE BIOSCAFFOLD COMPOSED OF OVINE (SHEEP) DERIVED EXTRACELLULAR MATRIX (ECM) AND MONOFILAMENT POLYPROPYLENE COLORED WITH ([PHATHALOCYANINATO(2-)] COPPER).  THE DEVICE CONSISTS OF TWO SIDES:  A TEXTURED AND SMOOTH SIDE.  THE TEXTURED SIDE OF THE DEVICE, INDICATED WITH BLUE POLYPROPYLENE, PROVIDES A SURFACE CONDUCIVE FOR TISSUE INGROWTH.  THE SMOOTH SIDE OF THE DEVICE, CONTAINING CLEAR POLYPROPYLENE, PROVIDES A SURFACE DESIGNED TO MINIMIZE TISSUE ATTACHMENT.  OVITEX 1S WILL INCORPORATE INTO THE RECIPIENT TISSUE WITH ASSOCIATED CELLULAR AND MICROVASCULAR INGROWTH.OVITEX 1S IS PROVIDED IN VARIOUS SHAPES AND SIZES TO SUIT SURGEON PREFERENCE AND THE COMPLEXITY OF THE SOFT TISSUE REPAIR.  THE DEVICE MAY BE TRIMMED TO A DESIRED SHAPE TO FURTHER ACCOMMODATE AN INDIVIDUAL PATIENT’S REQUIREMENTS.
Type: IMPLANTABLE DEVICE | Site: ABDOMEN | Status: FUNCTIONAL
Brand: OVITEX REINFORCED BIOSCAFFOLD WITH PERMANENT POLYMER (OVITEX)

## 2021-03-17 RX ORDER — CHLORHEXIDINE GLUCONATE 0.12 MG/ML
15 RINSE ORAL ONCE
Status: COMPLETED | OUTPATIENT
Start: 2021-03-17 | End: 2021-03-17

## 2021-03-17 RX ORDER — OXYCODONE HYDROCHLORIDE 5 MG/1
10 TABLET ORAL EVERY 4 HOURS PRN
Status: DISCONTINUED | OUTPATIENT
Start: 2021-03-17 | End: 2021-03-19 | Stop reason: HOSPADM

## 2021-03-17 RX ORDER — SODIUM CHLORIDE 9 MG/ML
75 INJECTION, SOLUTION INTRAVENOUS CONTINUOUS
Status: DISCONTINUED | OUTPATIENT
Start: 2021-03-17 | End: 2021-03-19 | Stop reason: HOSPADM

## 2021-03-17 RX ORDER — CEFAZOLIN SODIUM 2 G/50ML
2000 SOLUTION INTRAVENOUS ONCE
Status: DISCONTINUED | OUTPATIENT
Start: 2021-03-17 | End: 2021-03-17

## 2021-03-17 RX ORDER — MIDAZOLAM HYDROCHLORIDE 2 MG/2ML
2 INJECTION, SOLUTION INTRAMUSCULAR; INTRAVENOUS ONCE
Status: COMPLETED | OUTPATIENT
Start: 2021-03-17 | End: 2021-03-17

## 2021-03-17 RX ORDER — PANTOPRAZOLE SODIUM 40 MG/1
40 INJECTION, POWDER, FOR SOLUTION INTRAVENOUS DAILY
Status: DISCONTINUED | OUTPATIENT
Start: 2021-03-18 | End: 2021-03-19 | Stop reason: HOSPADM

## 2021-03-17 RX ORDER — ACETAMINOPHEN 325 MG/1
975 TABLET ORAL ONCE
Status: COMPLETED | OUTPATIENT
Start: 2021-03-17 | End: 2021-03-17

## 2021-03-17 RX ORDER — PROPOFOL 10 MG/ML
INJECTION, EMULSION INTRAVENOUS AS NEEDED
Status: DISCONTINUED | OUTPATIENT
Start: 2021-03-17 | End: 2021-03-17

## 2021-03-17 RX ORDER — CEFAZOLIN SODIUM 2 G/50ML
SOLUTION INTRAVENOUS AS NEEDED
Status: DISCONTINUED | OUTPATIENT
Start: 2021-03-17 | End: 2021-03-17

## 2021-03-17 RX ORDER — HYDROMORPHONE HCL/PF 1 MG/ML
0.5 SYRINGE (ML) INJECTION EVERY 2 HOUR PRN
Status: DISCONTINUED | OUTPATIENT
Start: 2021-03-17 | End: 2021-03-19 | Stop reason: HOSPADM

## 2021-03-17 RX ORDER — GABAPENTIN 300 MG/1
300 CAPSULE ORAL 3 TIMES DAILY
Status: DISCONTINUED | OUTPATIENT
Start: 2021-03-17 | End: 2021-03-19 | Stop reason: HOSPADM

## 2021-03-17 RX ORDER — FENTANYL CITRATE 50 UG/ML
INJECTION, SOLUTION INTRAMUSCULAR; INTRAVENOUS AS NEEDED
Status: DISCONTINUED | OUTPATIENT
Start: 2021-03-17 | End: 2021-03-17

## 2021-03-17 RX ORDER — NEOSTIGMINE METHYLSULFATE 1 MG/ML
INJECTION INTRAVENOUS AS NEEDED
Status: DISCONTINUED | OUTPATIENT
Start: 2021-03-17 | End: 2021-03-17

## 2021-03-17 RX ORDER — METOCLOPRAMIDE HYDROCHLORIDE 5 MG/ML
10 INJECTION INTRAMUSCULAR; INTRAVENOUS ONCE AS NEEDED
Status: DISCONTINUED | OUTPATIENT
Start: 2021-03-17 | End: 2021-03-17 | Stop reason: HOSPADM

## 2021-03-17 RX ORDER — GABAPENTIN 300 MG/1
300 CAPSULE ORAL ONCE
Status: COMPLETED | OUTPATIENT
Start: 2021-03-17 | End: 2021-03-17

## 2021-03-17 RX ORDER — BUPIVACAINE HYDROCHLORIDE 2.5 MG/ML
INJECTION, SOLUTION EPIDURAL; INFILTRATION; INTRACAUDAL AS NEEDED
Status: DISCONTINUED | OUTPATIENT
Start: 2021-03-17 | End: 2021-03-17 | Stop reason: HOSPADM

## 2021-03-17 RX ORDER — ACETAMINOPHEN 325 MG/1
975 TABLET ORAL EVERY 6 HOURS SCHEDULED
Status: DISCONTINUED | OUTPATIENT
Start: 2021-03-17 | End: 2021-03-19 | Stop reason: HOSPADM

## 2021-03-17 RX ORDER — HYDROMORPHONE HCL 110MG/55ML
PATIENT CONTROLLED ANALGESIA SYRINGE INTRAVENOUS AS NEEDED
Status: DISCONTINUED | OUTPATIENT
Start: 2021-03-17 | End: 2021-03-17

## 2021-03-17 RX ORDER — MIDAZOLAM HYDROCHLORIDE 2 MG/2ML
INJECTION, SOLUTION INTRAMUSCULAR; INTRAVENOUS AS NEEDED
Status: DISCONTINUED | OUTPATIENT
Start: 2021-03-17 | End: 2021-03-17

## 2021-03-17 RX ORDER — ONDANSETRON 2 MG/ML
INJECTION INTRAMUSCULAR; INTRAVENOUS AS NEEDED
Status: DISCONTINUED | OUTPATIENT
Start: 2021-03-17 | End: 2021-03-17

## 2021-03-17 RX ORDER — GLYCOPYRROLATE 0.2 MG/ML
INJECTION INTRAMUSCULAR; INTRAVENOUS AS NEEDED
Status: DISCONTINUED | OUTPATIENT
Start: 2021-03-17 | End: 2021-03-17

## 2021-03-17 RX ORDER — OXYCODONE HYDROCHLORIDE 5 MG/1
5 TABLET ORAL EVERY 4 HOURS PRN
Status: DISCONTINUED | OUTPATIENT
Start: 2021-03-17 | End: 2021-03-19 | Stop reason: HOSPADM

## 2021-03-17 RX ORDER — ROCURONIUM BROMIDE 10 MG/ML
INJECTION, SOLUTION INTRAVENOUS AS NEEDED
Status: DISCONTINUED | OUTPATIENT
Start: 2021-03-17 | End: 2021-03-17

## 2021-03-17 RX ORDER — EPHEDRINE SULFATE 50 MG/ML
INJECTION INTRAVENOUS AS NEEDED
Status: DISCONTINUED | OUTPATIENT
Start: 2021-03-17 | End: 2021-03-17

## 2021-03-17 RX ORDER — HYDROMORPHONE HCL/PF 1 MG/ML
0.5 SYRINGE (ML) INJECTION
Status: DISCONTINUED | OUTPATIENT
Start: 2021-03-17 | End: 2021-03-17 | Stop reason: HOSPADM

## 2021-03-17 RX ORDER — HEPARIN SODIUM 5000 [USP'U]/ML
5000 INJECTION, SOLUTION INTRAVENOUS; SUBCUTANEOUS EVERY 8 HOURS SCHEDULED
Status: DISCONTINUED | OUTPATIENT
Start: 2021-03-18 | End: 2021-03-19 | Stop reason: HOSPADM

## 2021-03-17 RX ORDER — FENTANYL CITRATE/PF 50 MCG/ML
25 SYRINGE (ML) INJECTION
Status: DISCONTINUED | OUTPATIENT
Start: 2021-03-17 | End: 2021-03-17 | Stop reason: HOSPADM

## 2021-03-17 RX ORDER — CEFAZOLIN SODIUM 1 G/50ML
1000 SOLUTION INTRAVENOUS EVERY 8 HOURS
Status: COMPLETED | OUTPATIENT
Start: 2021-03-17 | End: 2021-03-18

## 2021-03-17 RX ORDER — SODIUM CHLORIDE, SODIUM LACTATE, POTASSIUM CHLORIDE, CALCIUM CHLORIDE 600; 310; 30; 20 MG/100ML; MG/100ML; MG/100ML; MG/100ML
125 INJECTION, SOLUTION INTRAVENOUS CONTINUOUS
Status: DISCONTINUED | OUTPATIENT
Start: 2021-03-17 | End: 2021-03-17

## 2021-03-17 RX ORDER — ONDANSETRON 2 MG/ML
4 INJECTION INTRAMUSCULAR; INTRAVENOUS EVERY 6 HOURS PRN
Status: DISCONTINUED | OUTPATIENT
Start: 2021-03-17 | End: 2021-03-19 | Stop reason: HOSPADM

## 2021-03-17 RX ADMIN — EPHEDRINE SULFATE 10 MG: 50 INJECTION, SOLUTION INTRAVENOUS at 12:02

## 2021-03-17 RX ADMIN — FENTANYL CITRATE 50 MCG: 50 INJECTION, SOLUTION INTRAMUSCULAR; INTRAVENOUS at 09:38

## 2021-03-17 RX ADMIN — SODIUM CHLORIDE, SODIUM LACTATE, POTASSIUM CHLORIDE, AND CALCIUM CHLORIDE: .6; .31; .03; .02 INJECTION, SOLUTION INTRAVENOUS at 14:16

## 2021-03-17 RX ADMIN — NEOSTIGMINE METHYLSULFATE 3 MG: 1 INJECTION INTRAVENOUS at 15:03

## 2021-03-17 RX ADMIN — MORPHINE SULFATE 1 MG: 2 INJECTION, SOLUTION INTRAMUSCULAR; INTRAVENOUS at 23:11

## 2021-03-17 RX ADMIN — PHENYLEPHRINE HYDROCHLORIDE 100 MCG: 10 INJECTION INTRAVENOUS at 10:14

## 2021-03-17 RX ADMIN — FENTANYL CITRATE 50 MCG: 50 INJECTION, SOLUTION INTRAMUSCULAR; INTRAVENOUS at 11:52

## 2021-03-17 RX ADMIN — ROCURONIUM BROMIDE 10 MG: 10 INJECTION, SOLUTION INTRAVENOUS at 11:20

## 2021-03-17 RX ADMIN — ROCURONIUM BROMIDE 50 MG: 10 INJECTION, SOLUTION INTRAVENOUS at 09:42

## 2021-03-17 RX ADMIN — OXYCODONE HYDROCHLORIDE 10 MG: 5 TABLET ORAL at 21:00

## 2021-03-17 RX ADMIN — MIDAZOLAM 2 MG: 1 INJECTION INTRAMUSCULAR; INTRAVENOUS at 08:31

## 2021-03-17 RX ADMIN — EPHEDRINE SULFATE 10 MG: 50 INJECTION, SOLUTION INTRAVENOUS at 14:05

## 2021-03-17 RX ADMIN — GLYCOPYRROLATE 0.6 MG: 0.2 INJECTION, SOLUTION INTRAMUSCULAR; INTRAVENOUS at 15:03

## 2021-03-17 RX ADMIN — SODIUM CHLORIDE, SODIUM LACTATE, POTASSIUM CHLORIDE, AND CALCIUM CHLORIDE: .6; .31; .03; .02 INJECTION, SOLUTION INTRAVENOUS at 10:45

## 2021-03-17 RX ADMIN — HYDROMORPHONE HYDROCHLORIDE 0.5 MG: 2 INJECTION INTRAMUSCULAR; INTRAVENOUS; SUBCUTANEOUS at 15:27

## 2021-03-17 RX ADMIN — FENTANYL CITRATE 50 MCG: 50 INJECTION, SOLUTION INTRAMUSCULAR; INTRAVENOUS at 11:14

## 2021-03-17 RX ADMIN — GABAPENTIN 300 MG: 300 CAPSULE ORAL at 07:16

## 2021-03-17 RX ADMIN — ROCURONIUM BROMIDE 10 MG: 10 INJECTION, SOLUTION INTRAVENOUS at 13:21

## 2021-03-17 RX ADMIN — HYDROMORPHONE HYDROCHLORIDE 0.5 MG: 1 INJECTION, SOLUTION INTRAMUSCULAR; INTRAVENOUS; SUBCUTANEOUS at 18:28

## 2021-03-17 RX ADMIN — GABAPENTIN 300 MG: 300 CAPSULE ORAL at 17:08

## 2021-03-17 RX ADMIN — PHENYLEPHRINE HYDROCHLORIDE 50 MCG: 10 INJECTION INTRAVENOUS at 14:05

## 2021-03-17 RX ADMIN — EPHEDRINE SULFATE 10 MG: 50 INJECTION, SOLUTION INTRAVENOUS at 10:29

## 2021-03-17 RX ADMIN — PHENYLEPHRINE HYDROCHLORIDE 100 MCG: 10 INJECTION INTRAVENOUS at 10:22

## 2021-03-17 RX ADMIN — SODIUM CHLORIDE 125 ML/HR: 0.9 INJECTION, SOLUTION INTRAVENOUS at 16:42

## 2021-03-17 RX ADMIN — EPHEDRINE SULFATE 10 MG: 50 INJECTION, SOLUTION INTRAVENOUS at 12:13

## 2021-03-17 RX ADMIN — ACETAMINOPHEN 975 MG: 325 TABLET, FILM COATED ORAL at 07:16

## 2021-03-17 RX ADMIN — Medication 500 MG: at 09:36

## 2021-03-17 RX ADMIN — ROCURONIUM BROMIDE 20 MG: 10 INJECTION, SOLUTION INTRAVENOUS at 10:21

## 2021-03-17 RX ADMIN — GABAPENTIN 300 MG: 300 CAPSULE ORAL at 20:45

## 2021-03-17 RX ADMIN — HYDROMORPHONE HYDROCHLORIDE 0.5 MG: 2 INJECTION INTRAMUSCULAR; INTRAVENOUS; SUBCUTANEOUS at 15:22

## 2021-03-17 RX ADMIN — PHENYLEPHRINE HYDROCHLORIDE 100 MCG: 10 INJECTION INTRAVENOUS at 12:13

## 2021-03-17 RX ADMIN — FENTANYL CITRATE 50 MCG: 50 INJECTION, SOLUTION INTRAMUSCULAR; INTRAVENOUS at 11:57

## 2021-03-17 RX ADMIN — ROCURONIUM BROMIDE 20 MG: 10 INJECTION, SOLUTION INTRAVENOUS at 12:13

## 2021-03-17 RX ADMIN — CHLORHEXIDINE GLUCONATE 0.12% ORAL RINSE 15 ML: 1.2 LIQUID ORAL at 07:15

## 2021-03-17 RX ADMIN — MIDAZOLAM 2 MG: 1 INJECTION INTRAMUSCULAR; INTRAVENOUS at 10:19

## 2021-03-17 RX ADMIN — OXYCODONE HYDROCHLORIDE 10 MG: 5 TABLET ORAL at 17:09

## 2021-03-17 RX ADMIN — FENTANYL CITRATE 25 MCG: 50 INJECTION, SOLUTION INTRAMUSCULAR; INTRAVENOUS at 15:46

## 2021-03-17 RX ADMIN — PROPOFOL 200 MG: 10 INJECTION, EMULSION INTRAVENOUS at 09:42

## 2021-03-17 RX ADMIN — CEFAZOLIN SODIUM 2000 MG: 2 SOLUTION INTRAVENOUS at 14:07

## 2021-03-17 RX ADMIN — CEFAZOLIN SODIUM 2000 MG: 2 SOLUTION INTRAVENOUS at 10:07

## 2021-03-17 RX ADMIN — ONDANSETRON 4 MG: 2 INJECTION INTRAMUSCULAR; INTRAVENOUS at 13:42

## 2021-03-17 RX ADMIN — CEFAZOLIN SODIUM 1000 MG: 1 SOLUTION INTRAVENOUS at 17:08

## 2021-03-17 RX ADMIN — FENTANYL CITRATE 50 MCG: 50 INJECTION, SOLUTION INTRAMUSCULAR; INTRAVENOUS at 10:21

## 2021-03-17 RX ADMIN — ROCURONIUM BROMIDE 20 MG: 10 INJECTION, SOLUTION INTRAVENOUS at 10:46

## 2021-03-17 RX ADMIN — SODIUM CHLORIDE, SODIUM LACTATE, POTASSIUM CHLORIDE, AND CALCIUM CHLORIDE: .6; .31; .03; .02 INJECTION, SOLUTION INTRAVENOUS at 12:39

## 2021-03-17 RX ADMIN — EPHEDRINE SULFATE 10 MG: 50 INJECTION, SOLUTION INTRAVENOUS at 10:27

## 2021-03-17 RX ADMIN — SODIUM CHLORIDE, SODIUM LACTATE, POTASSIUM CHLORIDE, AND CALCIUM CHLORIDE 125 ML/HR: .6; .31; .03; .02 INJECTION, SOLUTION INTRAVENOUS at 07:21

## 2021-03-17 RX ADMIN — ROCURONIUM BROMIDE 10 MG: 10 INJECTION, SOLUTION INTRAVENOUS at 12:56

## 2021-03-17 NOTE — DISCHARGE INSTRUCTIONS
Jaylene Richard Instructions                  Dr Eduard NOBLES     1  General: You will feel pulling sensations around the wound or funny aches and pains around the incisions  This is normal  Even minor surgery is a change in your body and this is your bodys way of reaction to it  If you have had abdominal surgery, it may help to support the incision with a small pillow or blanket for comfort when moving or coughing  2  Wound care:       Glue - Leave glue alone, it will fall off on its own, no need for an additional dressings    Change dressing at drain site daily  Empty and record drain output 2 times daily and as needed  Bring a record of drain output to follow-up appoint with Dr Elisa Spencer  Routine ostomy care    3  Water: You may shower over the wounds  Do not bathe or use a pool or hot tub until cleared by the physician  You may shower right over the incisions and rinse wound with soapy water but do not scrub incisions  Pat dry when you are done  4  Activity: You may go up and down stairs, walk as much as you are comfortable, but walk at least 3 times each day  If you have had abdominal surgery, do not lift anything heavier than 15 pounds for at least 2 weeks, unless cleared by the doctor  5  Diet: You may resume a regular diet  If you had a same-day surgery or overnight stay surgery, you may wish to eat lightly for a few days: soups, crackers, and sandwiches  You may resume a regular diet when ready  6  Medications: Resume all of your previous medications, unless told otherwise by the doctor  Avoid aspirin products for 2-3 days after the date of surgery  You may, at that time, began to take them again  Tylenol and Ibuprofen are always fine, unless you are taking any narcotic pain medication containing Tylenol (such as Percocet, Darvocet, Vicodin, or anything containing acetaminophen)  Do not take Tylenol if you're taking these medications   You do not need to take the narcotic pain medications unless you are having significant pain and discomfort  7  Driving: You will need someone to drive you home on the day of surgery  Do not drive or make any important decisions while on narcotic pain medication or 24 hours and after anesthesia or sedation for surgery  Generally, you may drive when your off all narcotic pain medications  8  Upset Stomach: You may take Maalox, Tums, or similar items for an upset stomach  If your narcotic pain medication causes an upset stomach, do not take it on an empty stomach  Try taking it with at least some crackers or toast      9  Constipation: Patients often experienced constipation after surgery  You may take over-the-counter medication for this, such as Metamucil, Senokot, Dulcolax, milk of magnesia, etc  You may take a suppository unless you have had anorectal surgery such as a procedure on your hemorrhoids  If you experience significant nausea or vomiting after abdominal surgery, call the office before trying any of these medications  10  Call the office: If you are experiencing any of the following, fevers above 101 5°, significant nausea or vomiting, if the wound develops drainage and/or is excessive redness around the wound, or if you have significant diarrhea or other worsening symptoms  11  Pain: You may be given a prescription for pain  This will be given to the hospital, the day of surgery  12  Sexual Activity: You may resume sexual activity when you feel ready and comfortable and your incision is sealed and healed without apparent infection risk  13  Urination: If you haven't urinated in 6 hours, go directly to the ER for evaluation for urinary retention  14  Follow-up in 2 weeks          [facility]  Phone: [phone]

## 2021-03-17 NOTE — ANESTHESIA PREPROCEDURE EVALUATION
Procedure:  LAPAROSCOPY DIAGNOSTIC (N/A Abdomen)  RESECTION COLON SIGMOID LAPAROSCOPIC, POSSIBLE COLOSTOMY REVERSAL, POSSIBLE OPEN (N/A Abdomen)  possible open RESECTION COLON SIGMOID (N/A Abdomen)    Relevant Problems   CARDIO   (+) HTN (hypertension)   (+) Hyperlipidemia      ENDO   (+) Hypothyroidism      Other   (+) Colonic diverticular abscess   (+) Diverticulitis of large intestine with perforation   (+) Hyponatremia   (+) Intestinal stoma prolapse (HCC)   (+) Sinus tachycardia        Physical Exam    Airway    Mallampati score: II  TM Distance: >3 FB  Neck ROM: full     Dental   No notable dental hx     Cardiovascular  Rhythm: regular, Rate: abnormal,     Pulmonary  Pulmonary exam normal Breath sounds clear to auscultation,     Other Findings        Anesthesia Plan  ASA Score- 2     Anesthesia Type- general with ASA Monitors  Additional Monitors:   Airway Plan: ETT  Plan Factors-    Chart reviewed  EKG reviewed  Imaging results reviewed  Existing labs reviewed  Patient summary reviewed  Patient is not a current smoker  Induction- intravenous  Postoperative Plan- Plan for postoperative opioid use  Informed Consent- Anesthetic plan and risks discussed with patient  I personally reviewed this patient with the CRNA  Discussed and agreed on the Anesthesia Plan with the CRNA  Darwin Rivera

## 2021-03-17 NOTE — PLAN OF CARE
Problem: Potential for Falls  Goal: Patient will remain free of falls  Description: INTERVENTIONS:  - Assess patient frequently for physical needs  -  Identify cognitive and physical deficits and behaviors that affect risk of falls  -  [place] fall precautions as indicated by assessment   - Educate patient/family on patient safety including physical limitations  - Instruct patient to call for assistance with activity based on assessment  - Modify environment to reduce risk of injury  - Consider OT/PT consult to assist with strengthening/mobility  Outcome: Progressing     Problem: Nutrition/Hydration-ADULT  Goal: Nutrient/Hydration intake appropriate for improving, restoring or maintaining nutritional needs  Description: Monitor and assess patient's nutrition/hydration status for malnutrition  Collaborate with interdisciplinary team and initiate plan and interventions as ordered  Monitor patient's weight and dietary intake as ordered or per policy  Utilize nutrition screening tool and intervene as necessary  Determine patient's food preferences and provide high-protein, high-caloric foods as appropriate       INTERVENTIONS:  - Monitor oral intake, urinary output, labs, and treatment plans  - Assess nutrition and hydration status and recommend course of action  - Evaluate amount of meals eaten  - Assist patient with eating if necessary   - Allow adequate time for meals  - Recommend/ encourage appropriate diets, oral nutritional supplements, and vitamin/mineral supplements  - Order, calculate, and assess calorie counts as needed  - Recommend, monitor, and adjust tube feedings and TPN/PPN based on assessed needs  - Assess need for intravenous fluids  - Provide specific nutrition/hydration education as appropriate  - Include patient/family/caregiver in decisions related to nutrition  Outcome: Progressing     Problem: PAIN - ADULT  Goal: Verbalizes/displays adequate comfort level or baseline comfort level  Description: Interventions:  - Encourage patient to monitor pain and request assistance  - Assess pain using appropriate pain scale  - Administer analgesics based on type and severity of pain and evaluate response  - Implement non-pharmacological measures as appropriate and evaluate response  - Consider cultural and social influences on pain and pain management  - Notify physician/advanced practitioner if interventions unsuccessful or patient reports new pain  Outcome: Progressing     Problem: INFECTION - ADULT  Goal: Absence or prevention of progression during hospitalization  Description: INTERVENTIONS:  - Assess and monitor for signs and symptoms of infection  - Monitor lab/diagnostic results  - Monitor all insertion sites, i e  indwelling lines, tubes, and drains  - Monitor endotracheal if appropriate and nasal secretions for changes in amount and color  - [place] appropriate cooling/warming therapies per order  - Administer medications as ordered  - Instruct and encourage patient and family to use good hand hygiene technique  - Identify and instruct in appropriate isolation precautions for identified infection/condition  Outcome: Progressing  Goal: Absence of fever/infection during neutropenic period  Description: INTERVENTIONS:  - Monitor WBC    Outcome: Progressing     Problem: SAFETY ADULT  Goal: Patient will remain free of falls  Description: INTERVENTIONS:  - Assess patient frequently for physical needs  -  Identify cognitive and physical deficits and behaviors that affect risk of falls    -  [place] fall precautions as indicated by assessment   - Educate patient/family on patient safety including physical limitations  - Instruct patient to call for assistance with activity based on assessment  - Modify environment to reduce risk of injury  - Consider OT/PT consult to assist with strengthening/mobility  Outcome: Progressing  Goal: Maintain or return to baseline ADL function  Description: INTERVENTIONS:  - Assess patient frequently for physical needs  -  Identify cognitive and physical deficits and behaviors that affect risk of falls    -  [place] fall precautions as indicated by assessment   - Educate patient/family on patient safety including physical limitations  - Instruct patient to call for assistance with activity based on assessment  - Modify environment to reduce risk of injury  - Consider OT/PT consult to assist with strengthening/mobility  Outcome: Progressing  Goal: Maintain or return mobility status to optimal level  Description: INTERVENTIONS:  -  Assess patient's ability to carry out ADLs; assess patient's baseline for ADL function and identify physical deficits which impact ability to perform ADLs (bathing, care of mouth/teeth, toileting, grooming, dressing, etc )  - Assess/evaluate cause of self-care deficits   - Assess range of motion  - Assess patient's mobility; develop plan if impaired  - Assess patient's need for assistive devices and provide as appropriate  - Encourage maximum independence but intervene and supervise when necessary  - Involve family in performance of ADLs  - Assess for home care needs following discharge   - Consider OT consult to assist with ADL evaluation and planning for discharge  - Provide patient education as appropriate  Outcome: Progressing     Problem: DISCHARGE PLANNING  Goal: Discharge to home or other facility with appropriate resources  Description: INTERVENTIONS:  - Identify barriers to discharge w/patient and caregiver  - Arrange for needed discharge resources and transportation as appropriate  - Identify discharge learning needs (meds, wound care, etc )  - Arrange for interpretive services to assist at discharge as needed  - Refer to Case Management Department for coordinating discharge planning if the patient needs post-hospital services based on physician/advanced practitioner order or complex needs related to functional status, cognitive ability, or social support system  Outcome: Progressing

## 2021-03-17 NOTE — INTERIM OP NOTE
LAPAROSCOPY DIAGNOSTIC, RESECTION COLON SIGMOID LAPAROSCOPIC, EXTENSIVE LYSIS OF ADHESIONS, TAKTDON OF SPLENIC FELXURE, LOW ANTERIOR RESECTION WITH COLORECTAL ANASTOMOSIS, INTEROPERATIVE COLONOSCOPY, OPEN REPAIR OF INCISIONAL HERNIA  Postoperative Note  PATIENT NAME: Skye Mane  : 1970  MRN: [id number]  [facility] OR ROOM 02    Surgery Date: 3/17/2021    Preop Diagnosis:  Sigmoid diverticulitis [K57 32]  Colostomy in place [facility]) [Z93 3]    Post-Op Diagnosis Codes:     * Sigmoid diverticulitis [K57 32]     * Colostomy in place ([facility] [address] ) [Z93 3]    Procedure(s) (LRB):  LAPAROSCOPY DIAGNOSTIC (N/A)  RESECTION COLON SIGMOID LAPAROSCOPIC, EXTENSIVE LYSIS OF ADHESIONS, TAKTDON OF SPLENIC FELXURE, LOW ANTERIOR RESECTION WITH COLORECTAL ANASTOMOSIS, INTEROPERATIVE COLONOSCOPY, OPEN REPAIR OF INCISIONAL HERNIA (N/A)    Surgeon(s) and Role:     * Hank Oneill MD - Primary     * Mela Rodriguez PA-C - Assisting     * Ronda Nguyen PA-C - Assisting    Specimens:  ID Type Source Tests Collected by Time Destination   1 : Sigmoid Colon With Anastomotic Rings Tissue Large Intestine, Sigmoid Colon TISSUE EXAM Hank Oneill MD 3/17/2021 1419        Estimated Blood Loss:   50 mL    Anesthesia Type:   General ETA    Findings:   As above  Significant diverticular disease extending to rectum,  Requiring low anterior resection  Intraoperative colonoscopy with intact  Colorectal anastomosis,  Negative leak test  Midline incisional hernia,  Also with findings of right inguinal hernia      Complications:   None    Hernia Surgery Operative Note    Name: Skye Mane    Gender: male     Age: 45 y o  Race:     BMI: There is no height or weight on file to calculate BMI  DIAGNOSIS:   1  Sigmoid diverticulitis  Type and screen    Type and screen    ABORh Recheck - Contact Blood Bank Prior to [address] Blood Bank Prior to Collection    Tissue Exam    Tissue Exam   2   Colostomy in place [facility]) Type and screen    Type and screen    ABORh Recheck - Contact Blood Bank Prior to Collection    ABORh Recheck - Contact Blood Bank Prior to Collection    Tissue Exam    Tissue Exam       Diabetes Mellitis: No    Coronary Heart Disease: No    Cancer: No    Steroid Use: No    Tobacco use: No   Last used: 6 yrs ago   Type: cigarettes     Alcohol use: Yes Moderate    Location of Hernia: incisional  midline  Length:5 cm  Width:3 cm  Primary: Yes  Recurrent: No   Number of recurrences:    Access: Open    Component Separation: No    Mesh:   Yes -  Type: Biologic   [employer]    Operative Time: 4 hrs 52 min           SIGNATURE: Faith Rodriguez PA-C   DATE: March 17, 2021   TIME: 3:32 PM

## 2021-03-17 NOTE — ANESTHESIA POSTPROCEDURE EVALUATION
Post-Op Assessment Note    CV Status:  Stable  Pain Score: 0    Pain management: adequate     Mental Status:  Alert and awake   Hydration Status:  Euvolemic   PONV Controlled:  Controlled   Airway Patency:  Patent      Post Op Vitals Reviewed: Yes      Staff: Anesthesiologist, CRNA   Reason for prolonged intubation > 24 hours:  NAReason for prolonged intubation > 48 hours:  NA      No complications documented      BP      Temp      Pulse     Resp      SpO2 1) PCP Contacted on Admission: (Y/N) --> Name & Phone #: NA PATIENT DOES NOT HAVE A PCP  2) Date of Contact with PCP:  3) PCP Contacted at Discharge: (Y/N)  4) Summary of Handoff Given to PCP:   5) Post-Discharge Appointment Date and Location:

## 2021-03-18 ENCOUNTER — APPOINTMENT (INPATIENT)
Dept: LAB | Facility: CLINIC | Age: 51
DRG: 331 | End: 2021-03-18
Payer: COMMERCIAL

## 2021-03-18 LAB
ALBUMIN SERPL BCP-MCNC: 2.8 G/DL (ref 3.5–5)
ALP SERPL-CCNC: 50 U/L (ref 46–116)
ALT SERPL W P-5'-P-CCNC: 24 U/L (ref 12–78)
ANION GAP SERPL CALCULATED.3IONS-SCNC: 11 MMOL/L (ref 4–13)
AST SERPL W P-5'-P-CCNC: 12 U/L (ref 5–45)
BASOPHILS # BLD AUTO: 0.03 THOUSANDS/ΜL (ref 0–0.1)
BASOPHILS NFR BLD AUTO: 0 % (ref 0–1)
BILIRUB SERPL-MCNC: 0.5 MG/DL (ref 0.2–1)
BUN SERPL-MCNC: 7 MG/DL (ref 5–25)
CALCIUM ALBUM COR SERPL-MCNC: 9.6 MG/DL (ref 8.3–10.1)
CALCIUM SERPL-MCNC: 8.6 MG/DL (ref 8.3–10.1)
CHLORIDE SERPL-SCNC: 104 MMOL/L (ref 100–108)
CO2 SERPL-SCNC: 23 MMOL/L (ref 21–32)
CREAT SERPL-MCNC: 0.85 MG/DL (ref 0.6–1.3)
EOSINOPHIL # BLD AUTO: 0 THOUSAND/ΜL (ref 0–0.61)
EOSINOPHIL NFR BLD AUTO: 0 % (ref 0–6)
ERYTHROCYTE [DISTWIDTH] IN BLOOD BY AUTOMATED COUNT: 13.8 % (ref 11.6–15.1)
GFR SERPL CREATININE-BSD FRML MDRD: 100 ML/MIN/1.73SQ M
GLUCOSE SERPL-MCNC: 135 MG/DL (ref 65–140)
HCT VFR BLD AUTO: 35.2 % (ref 36.5–49.3)
HGB BLD-MCNC: 11.7 G/DL (ref 12–17)
IMM GRANULOCYTES # BLD AUTO: 0.08 THOUSAND/UL (ref 0–0.2)
IMM GRANULOCYTES NFR BLD AUTO: 1 % (ref 0–2)
LYMPHOCYTES # BLD AUTO: 0.99 THOUSANDS/ΜL (ref 0.6–4.47)
LYMPHOCYTES NFR BLD AUTO: 6 % (ref 14–44)
MAGNESIUM SERPL-MCNC: 1.7 MG/DL (ref 1.6–2.6)
MCH RBC QN AUTO: 31.2 PG (ref 26.8–34.3)
MCHC RBC AUTO-ENTMCNC: 33.2 G/DL (ref 31.4–37.4)
MCV RBC AUTO: 94 FL (ref 82–98)
MONOCYTES # BLD AUTO: 1.46 THOUSAND/ΜL (ref 0.17–1.22)
MONOCYTES NFR BLD AUTO: 9 % (ref 4–12)
NEUTROPHILS # BLD AUTO: 13.26 THOUSANDS/ΜL (ref 1.85–7.62)
NEUTS SEG NFR BLD AUTO: 84 % (ref 43–75)
NRBC BLD AUTO-RTO: 0 /100 WBCS
PHOSPHATE SERPL-MCNC: 3.5 MG/DL (ref 2.7–4.5)
PLATELET # BLD AUTO: 252 THOUSANDS/UL (ref 149–390)
PMV BLD AUTO: 8.3 FL (ref 8.9–12.7)
POTASSIUM SERPL-SCNC: 4.1 MMOL/L (ref 3.5–5.3)
PROT SERPL-MCNC: 6 G/DL (ref 6.4–8.2)
RBC # BLD AUTO: 3.75 MILLION/UL (ref 3.88–5.62)
SODIUM SERPL-SCNC: 138 MMOL/L (ref 136–145)
WBC # BLD AUTO: 15.82 THOUSAND/UL (ref 4.31–10.16)

## 2021-03-18 PROCEDURE — 80053 COMPREHEN METABOLIC PANEL: CPT | Performed by: PHYSICIAN ASSISTANT

## 2021-03-18 PROCEDURE — C9113 INJ PANTOPRAZOLE SODIUM, VIA: HCPCS | Performed by: PHYSICIAN ASSISTANT

## 2021-03-18 PROCEDURE — 84100 ASSAY OF PHOSPHORUS: CPT | Performed by: PHYSICIAN ASSISTANT

## 2021-03-18 PROCEDURE — 83735 ASSAY OF MAGNESIUM: CPT | Performed by: PHYSICIAN ASSISTANT

## 2021-03-18 PROCEDURE — 99024 POSTOP FOLLOW-UP VISIT: CPT | Performed by: PHYSICIAN ASSISTANT

## 2021-03-18 PROCEDURE — 85025 COMPLETE CBC W/AUTO DIFF WBC: CPT | Performed by: PHYSICIAN ASSISTANT

## 2021-03-18 RX ORDER — ATORVASTATIN CALCIUM 10 MG/1
10 TABLET, FILM COATED ORAL
Status: DISCONTINUED | OUTPATIENT
Start: 2021-03-18 | End: 2021-03-19 | Stop reason: HOSPADM

## 2021-03-18 RX ORDER — LEVOTHYROXINE SODIUM 0.07 MG/1
75 TABLET ORAL
Status: DISCONTINUED | OUTPATIENT
Start: 2021-03-18 | End: 2021-03-19 | Stop reason: HOSPADM

## 2021-03-18 RX ADMIN — OXYCODONE HYDROCHLORIDE 10 MG: 5 TABLET ORAL at 11:43

## 2021-03-18 RX ADMIN — HYDROMORPHONE HYDROCHLORIDE 0.5 MG: 1 INJECTION, SOLUTION INTRAMUSCULAR; INTRAVENOUS; SUBCUTANEOUS at 08:11

## 2021-03-18 RX ADMIN — OXYCODONE HYDROCHLORIDE 10 MG: 5 TABLET ORAL at 05:05

## 2021-03-18 RX ADMIN — SODIUM CHLORIDE 125 ML/HR: 0.9 INJECTION, SOLUTION INTRAVENOUS at 08:03

## 2021-03-18 RX ADMIN — GABAPENTIN 300 MG: 300 CAPSULE ORAL at 16:55

## 2021-03-18 RX ADMIN — HEPARIN SODIUM 5000 UNITS: 5000 INJECTION INTRAVENOUS; SUBCUTANEOUS at 13:21

## 2021-03-18 RX ADMIN — GABAPENTIN 300 MG: 300 CAPSULE ORAL at 22:00

## 2021-03-18 RX ADMIN — GABAPENTIN 300 MG: 300 CAPSULE ORAL at 08:03

## 2021-03-18 RX ADMIN — ACETAMINOPHEN 975 MG: 325 TABLET, FILM COATED ORAL at 23:11

## 2021-03-18 RX ADMIN — OXYCODONE HYDROCHLORIDE 10 MG: 5 TABLET ORAL at 22:26

## 2021-03-18 RX ADMIN — HEPARIN SODIUM 5000 UNITS: 5000 INJECTION INTRAVENOUS; SUBCUTANEOUS at 22:00

## 2021-03-18 RX ADMIN — CEFAZOLIN SODIUM 1000 MG: 1 SOLUTION INTRAVENOUS at 02:33

## 2021-03-18 RX ADMIN — OXYCODONE HYDROCHLORIDE 10 MG: 5 TABLET ORAL at 17:19

## 2021-03-18 RX ADMIN — LEVOTHYROXINE SODIUM 75 MCG: 75 TABLET ORAL at 05:05

## 2021-03-18 RX ADMIN — PANTOPRAZOLE SODIUM 40 MG: 40 INJECTION, POWDER, FOR SOLUTION INTRAVENOUS at 08:03

## 2021-03-18 RX ADMIN — ACETAMINOPHEN 975 MG: 325 TABLET, FILM COATED ORAL at 11:43

## 2021-03-18 RX ADMIN — ACETAMINOPHEN 975 MG: 325 TABLET, FILM COATED ORAL at 17:19

## 2021-03-18 RX ADMIN — HEPARIN SODIUM 5000 UNITS: 5000 INJECTION INTRAVENOUS; SUBCUTANEOUS at 05:06

## 2021-03-18 RX ADMIN — MORPHINE SULFATE 1 MG: 2 INJECTION, SOLUTION INTRAMUSCULAR; INTRAVENOUS at 02:33

## 2021-03-18 NOTE — CASE MANAGEMENT
LOS: 1 day  PATIENT IS NOT A MEDICARE BUNDLE OR A 30 DAY READMISSION  UNPLANNED READMISSION RISK SCORE IS 16 - GREEN  Met with patient  Explained role of care management  Patient lives with spouse Wylie Krabbe in a 2 story home with no steps to enter  He is independent adl's and ambulation, drives, is currently unemployed  DME - denies  Past services - [place], [facility]  Denies history of SNF, MH or D&A  He does not have a POA/AD and is not interested in information  Pharmacy of choice is [facility] in [place]  He has a prescription plan and is able to afford his medication  His PCP is Noelle Santana  His spouse will help at home if needed  He plans on returning home at [address] and does not anticipate any discharge needs  Spouse will transport home  Will follow  CM reviewed d/c planning process including the following: identifying help at home, patient preference for d/c planning needs, availability of treatment team to discuss questions or concerns patient and/or family may have regarding understanding medications and recognizing signs and symptoms once discharged  CM also encouraged patient to follow up with all recommended appointments after discharge  Patient advised of importance for patient and family to participate in managing patients medical well being

## 2021-03-18 NOTE — UTILIZATION REVIEW
Initial Clinical Review    Elective  Inpatient  surgical procedure    Age/Sex: 45 y o  male     Surgery Date:  3/18/2021     Procedure: LAPAROSCOPY DIAGNOSTIC (N/A)  RESECTION COLON SIGMOID LAPAROSCOPIC, EXTENSIVE LYSIS OF ADHESIONS, TAKTDON OF SPLENIC FELXURE, LOW ANTERIOR RESECTION WITH COLORECTAL ANASTOMOSIS, INTEROPERATIVE COLONOSCOPY, OPEN REPAIR OF INCISIONAL HERNIA     Anesthesia: general     Operative Findings: Significant diverticular disease extending to rectum,  Requiring low anterior resection  Intraoperative colonoscopy with intact  Colorectal anastomosis,  Negative leak test  Midline incisional hernia,  Also with findings of right inguinal hernia    POD#1 Progress Note: 3/18/2021:  Patient is post operative day one  Diagnostic laparoscopy, resection sigmoid colon, SAMARA, takedown of splenic flexure, low anterior resection with colo rectal anastomosis, intraoperative colonoscopy; open repair of incisional hernia  Patient has pain around midline incision  Stool and gas in the bag  Clears tolerated  On exam Abdomen soft, non distended  Incisions c/d/i ostomy output noted in bag  No drainage  TTP over incision sites  H&H 11 7/35 2   Plan is continued pain control, advance diet as tolerated  Continue post op antibiotics, decrease IVF,   Monitor colostomy  Dc hutchins  Home antihypertensives on hold       Admission Orders: Date/Time/Statement:   Admission Orders (From admission, onward)     Ordered        03/17/21 1617  Inpatient Admission  Once                   Orders Placed This Encounter   Procedures    Inpatient Admission     Standing Status:   Standing     Number of Occurrences:   1     Order Specific Question:   Level of Care     Answer:   Med Surg [16]     Order Specific Question:   Estimated length of stay     Answer:   More than 2 Midnights     Order Specific Question:   Certification     Answer:   I certify that inpatient services are medically necessary for this patient for a duration of greater than two midnights  See H&P and MD Progress Notes for additional information about the patient's course of treatment       Vital Signs: /67 (BP Location: Left arm)   Pulse 91   Temp 98 4 °F (36 9 °C) (Oral)   Resp 18   SpO2 97%    03/18/21 0741  98 4 °F (36 9 °C)  91  18  119/67  86  97 %  None (Room air)  --  Sitting   03/17/21 2206  98 1 °F (36 7 °C)  110Abnormal   18  126/72  --  98 %  None (Room air)  --  Lying   03/17/21 1700  --  --  --  --  --  --  None (Room air)  --  --   03/17/21 1643  97 5 °F (36 4 °C)  113Abnormal   19  112/81  --  100 %  None (Room air         Results from last 7 days   Lab Units 03/18/21  0439   WBC Thousand/uL 15 82*   HEMOGLOBIN g/dL 11 7*   HEMATOCRIT % 35 2*   PLATELETS Thousands/uL 252   NEUTROS ABS Thousands/µL 13 26*     Results from last 7 days   Lab Units 03/18/21  0439   SODIUM mmol/L 138   POTASSIUM mmol/L 4 1   CHLORIDE mmol/L 104   CO2 mmol/L 23   ANION GAP mmol/L 11   BUN mg/dL 7   CREATININE mg/dL 0 85   EGFR ml/min/1 73sq m 100   CALCIUM mg/dL 8 6   MAGNESIUM mg/dL 1 7   PHOSPHORUS mg/dL 3 5     Results from last 7 days   Lab Units 03/18/21  0439   AST U/L 12   ALT U/L 24   ALK PHOS U/L 50   TOTAL PROTEIN g/dL 6 0*   ALBUMIN g/dL 2 8*   TOTAL BILIRUBIN mg/dL 0 50     Results from last 7 days   Lab Units 03/18/21  0439   GLUCOSE RANDOM mg/dL 135     Results from last 7 days   Lab Units 03/16/21  0744   CLARITY UA  Turbid   COLOR UA  Dk Yellow   SPEC GRAV UA  1 029   PH UA  6 0   GLUCOSE UA mg/dl Negative   KETONES UA mg/dl Negative   BLOOD UA  Negative   PROTEIN UA mg/dl Trace*   NITRITE UA  Negative   BILIRUBIN UA  Negative   UROBILINOGEN UA E U /dl 0 2   LEUKOCYTES UA  Negative   WBC UA /hpf None Seen   RBC UA /hpf 4-10*   BACTERIA UA /hpf None Seen   EPITHELIAL CELLS WET PREP /hpf None Seen       Diet:  Surgical/soft lite meal     Mobility:  Up as tolerated    DVT Prophylaxis: heparin    Medications/Pain Control:   Scheduled Medications:  acetaminophen, 975 mg, Oral, Q6H [address]  atorvastatin, 10 mg, Oral, Daily With Dinner  gabapentin, 300 mg, Oral, TID  heparin (porcine), 5,000 Units, Subcutaneous, Q8H [address]  levothyroxine, 75 mcg, Oral, Early Morning  pantoprazole, 40 mg, Intravenous, Daily    ceFAZolin (ANCEF) IVPB (premix in dextrose) 1,000 mg 50 mL   Dose: 1,000 mg  Freq: Every 8 hours Route: IV  Last Dose: 1,000 mg (03/18/21 0233)  Start: 03/17/21 1800 End: 03/18/21 0303    Continuous IV Infusions:  sodium chloride, 75 mL/hr, Intravenous, Continuous    lactated ringers infusion   Rate: 125 mL/hr Dose: 125 mL/hr  Freq: Continuous Route: IV  Indications of Use: IV Hydration  Last Dose: Stopped (03/17/21 1700)  Start: 03/17/21 0700 End: 03/17/21 1629    PRN Meds:  HYDROmorphone, 0 5 mg, Intravenous, Q2H PRN - used x 2 (9784, 5676)  ondansetron, 4 mg, Intravenous, Q6H PRN  oxyCODONE, 10 mg, Oral, Q4H PRN -  Used x 4   oxyCODONE, 5 mg, Oral, Q4H PRN    morphine injection 1 mg - used x 2 (2348, 1129)  Dose: 1 mg  Freq: Every 2 hour PRN Route: IV  PRN Reason: breakthrough pain  Start: 03/17/21 2203 End: 03/18/21 1358    Network Utilization Review Department  ATTENTION: Please call with any questions or concerns to [phone] and carefully listen to the prompts so that you are directed to the right person  All voicemails are confidential   Herman Reeves all requests for admission clinical reviews, approved or denied determinations and any other requests to dedicated fax number below belonging to the campus where the patient is receiving treatment   List of dedicated fax numbers for the Facilities:  [address] [place] [address] DENIALS (Administrative/Medical Necessity) [phone]   [address] [place] [address] (Maternity/NICU/Pediatrics) 261 [facility] [address] [place] Berta Caldwell 1277 (Anthony Rater) [address] [place] [address] [facility] [phone]   [address] [phone]

## 2021-03-18 NOTE — PROGRESS NOTES
Progress Note - Romel Barnett 45 y o  male MRN: [id number]    Unit/Bed#: -Christine Encounter: [id number]      Assessment/Plan:-  POD 1 LAPAROSCOPY DIAGNOSTIC (N/A)  RESECTION COLON SIGMOID LAPAROSCOPIC, EXTENSIVE LYSIS OF ADHESIONS, TAKEDOWN OF SPLENIC FELXURE, LOW ANTERIOR RESECTION WITH COLORECTAL ANASTOMOSIS, INTEROPERATIVE COLONOSCOPY, OPEN REPAIR OF INCISIONAL HERNIA (N/A), Drain placement  - Tolerating diet, advance as tolerated  - Pain control  - Post op abx  - WBC 15 82  H/H 11 7/35 2  - IVF @ 125, down to 75 now  - Colostomy working well, cont to monitor  Op not recorded this am  - GHADA output 85 since surgery  - Love is out  - Ambulate patient  - DVT  prophylaxis      HTN  -Home meds on hold for now, Pt's BP is normotensive  Cont to monitor    HLD  -cont Statin    HYPOTHYROIDISM  -Levothyroxine        Subjective:   Patient doing well  C/o mild pain around the midline incision  +gas and stool in the bag  Tolerating clears  Objective:     Vitals: Blood pressure 119/67, pulse 91, temperature 98 4 °F (36 9 °C), temperature source Oral, resp  rate 18, SpO2 97 %  ,There is no height or weight on file to calculate BMI  Intake/Output Summary (Last 24 hours) at 3/18/2021 0859  Last data filed at 3/18/2021 0601  Gross per 24 hour   Intake 3800 ml   Output 1835 ml   Net 1965 ml       Physical Exam:   Gen: Awake, alert, NAD  HEENT: AT, NC  PERRL  Cardiovascular:- RRR  Pulm: CTAB  Abdomen: Abdomen soft, non distended  Incisions c/d/i ostomy output noted in bag  No drainage  TTP over the incision sites  +BS  Extremities: warm and dry          Invasive Devices     Peripheral Intravenous Line            Peripheral IV 03/17/21 Left Hand 1 day          Drain            Colostomy Loop  days    Closed/Suction Drain Left LUQ Bulb 15 Fr  less than 1 day                Lab, Imaging and other studies: I have personally reviewed pertinent reports      VTE Pharmacologic Prophylaxis: Heparin  VTE Mechanical Prophylaxis: sequential compression device

## 2021-03-19 VITALS
BODY MASS INDEX: 22.11 KG/M2 | DIASTOLIC BLOOD PRESSURE: 80 MMHG | HEART RATE: 95 BPM | HEIGHT: 72 IN | TEMPERATURE: 98.3 F | RESPIRATION RATE: 18 BRPM | SYSTOLIC BLOOD PRESSURE: 138 MMHG | OXYGEN SATURATION: 98 %

## 2021-03-19 PROBLEM — K57.20 DIVERTICULITIS OF LARGE INTESTINE WITH PERFORATION: Status: RESOLVED | Noted: 2020-10-10 | Resolved: 2021-03-19

## 2021-03-19 LAB
ANION GAP SERPL CALCULATED.3IONS-SCNC: 7 MMOL/L (ref 4–13)
BASOPHILS # BLD AUTO: 0.03 THOUSANDS/ΜL (ref 0–0.1)
BASOPHILS NFR BLD AUTO: 0 % (ref 0–1)
BUN SERPL-MCNC: 4 MG/DL (ref 5–25)
CALCIUM SERPL-MCNC: 8.2 MG/DL (ref 8.3–10.1)
CHLORIDE SERPL-SCNC: 107 MMOL/L (ref 100–108)
CO2 SERPL-SCNC: 27 MMOL/L (ref 21–32)
CREAT SERPL-MCNC: 0.65 MG/DL (ref 0.6–1.3)
EOSINOPHIL # BLD AUTO: 0.3 THOUSAND/ΜL (ref 0–0.61)
EOSINOPHIL NFR BLD AUTO: 3 % (ref 0–6)
ERYTHROCYTE [DISTWIDTH] IN BLOOD BY AUTOMATED COUNT: 14 % (ref 11.6–15.1)
GFR SERPL CREATININE-BSD FRML MDRD: 112 ML/MIN/1.73SQ M
GLUCOSE SERPL-MCNC: 99 MG/DL (ref 65–140)
HCT VFR BLD AUTO: 32.4 % (ref 36.5–49.3)
HGB BLD-MCNC: 10.6 G/DL (ref 12–17)
IMM GRANULOCYTES # BLD AUTO: 0.04 THOUSAND/UL (ref 0–0.2)
IMM GRANULOCYTES NFR BLD AUTO: 0 % (ref 0–2)
LYMPHOCYTES # BLD AUTO: 2.75 THOUSANDS/ΜL (ref 0.6–4.47)
LYMPHOCYTES NFR BLD AUTO: 25 % (ref 14–44)
MAGNESIUM SERPL-MCNC: 1.9 MG/DL (ref 1.6–2.6)
MCH RBC QN AUTO: 31 PG (ref 26.8–34.3)
MCHC RBC AUTO-ENTMCNC: 32.7 G/DL (ref 31.4–37.4)
MCV RBC AUTO: 95 FL (ref 82–98)
MONOCYTES # BLD AUTO: 1.05 THOUSAND/ΜL (ref 0.17–1.22)
MONOCYTES NFR BLD AUTO: 9 % (ref 4–12)
NEUTROPHILS # BLD AUTO: 6.95 THOUSANDS/ΜL (ref 1.85–7.62)
NEUTS SEG NFR BLD AUTO: 63 % (ref 43–75)
NRBC BLD AUTO-RTO: 0 /100 WBCS
PLATELET # BLD AUTO: 227 THOUSANDS/UL (ref 149–390)
PMV BLD AUTO: 8.7 FL (ref 8.9–12.7)
POTASSIUM SERPL-SCNC: 3.7 MMOL/L (ref 3.5–5.3)
RBC # BLD AUTO: 3.42 MILLION/UL (ref 3.88–5.62)
SODIUM SERPL-SCNC: 141 MMOL/L (ref 136–145)
WBC # BLD AUTO: 11.12 THOUSAND/UL (ref 4.31–10.16)

## 2021-03-19 PROCEDURE — C9113 INJ PANTOPRAZOLE SODIUM, VIA: HCPCS | Performed by: PHYSICIAN ASSISTANT

## 2021-03-19 PROCEDURE — 85025 COMPLETE CBC W/AUTO DIFF WBC: CPT | Performed by: PHYSICIAN ASSISTANT

## 2021-03-19 PROCEDURE — 80048 BASIC METABOLIC PNL TOTAL CA: CPT | Performed by: PHYSICIAN ASSISTANT

## 2021-03-19 PROCEDURE — 83735 ASSAY OF MAGNESIUM: CPT | Performed by: PHYSICIAN ASSISTANT

## 2021-03-19 PROCEDURE — 99024 POSTOP FOLLOW-UP VISIT: CPT | Performed by: PHYSICIAN ASSISTANT

## 2021-03-19 PROCEDURE — NC001 PR NO CHARGE: Performed by: PHYSICIAN ASSISTANT

## 2021-03-19 RX ORDER — ACETAMINOPHEN 325 MG/1
650 TABLET ORAL EVERY 6 HOURS PRN
Qty: 60 TABLET | Refills: 0
Start: 2021-03-19

## 2021-03-19 RX ORDER — IBUPROFEN 200 MG
600 TABLET ORAL EVERY 6 HOURS PRN
Qty: 60 TABLET | Refills: 0
Start: 2021-03-19

## 2021-03-19 RX ORDER — OXYCODONE HYDROCHLORIDE 5 MG/1
5 TABLET ORAL EVERY 4 HOURS PRN
Qty: 18 TABLET | Refills: 0 | Status: SHIPPED | OUTPATIENT
Start: 2021-03-19 | End: 2021-03-22

## 2021-03-19 RX ADMIN — OXYCODONE HYDROCHLORIDE 10 MG: 5 TABLET ORAL at 10:03

## 2021-03-19 RX ADMIN — ACETAMINOPHEN 975 MG: 325 TABLET, FILM COATED ORAL at 05:31

## 2021-03-19 RX ADMIN — GABAPENTIN 300 MG: 300 CAPSULE ORAL at 08:13

## 2021-03-19 RX ADMIN — LEVOTHYROXINE SODIUM 75 MCG: 75 TABLET ORAL at 05:31

## 2021-03-19 RX ADMIN — PANTOPRAZOLE SODIUM 40 MG: 40 INJECTION, POWDER, FOR SOLUTION INTRAVENOUS at 08:13

## 2021-03-19 RX ADMIN — HEPARIN SODIUM 5000 UNITS: 5000 INJECTION INTRAVENOUS; SUBCUTANEOUS at 05:30

## 2021-03-19 RX ADMIN — OXYCODONE HYDROCHLORIDE 10 MG: 5 TABLET ORAL at 06:06

## 2021-03-19 NOTE — PROGRESS NOTES
Progress Note - General Surgery   Maxx Peters 45 y o  male MRN: [id number]  Unit/Bed#: -01 Encounter: [id number]    Assessment/plan:  Complicated diverticulitis with perforation and abscess  -s/p abdominal drainage and diverting right sided colostomy 11/2/21  -POD#1 s/p  Laparoscopic LAR with colorectal anastomosis,  Takedown of splenic flexure, lysis of adhesions, intraoperative colonoscopy, TAP  Block,  Open repair of incisional hernia with  Biologic mesh  - diverting colostomy left in place, will allow LAR anastomosis to heal prior to reversing colostomy  - patient is doing well, pain controlled with oral medication, tolerating regular diet,  Colostomy functioning well  - will plan for discharge later today with oral pain medication  - continue out of bed, ambulation, incentive spirometer  - drain care reviewed with patient, monitor output  - office follow-up in 2 weeks  -  Plan for colostomy reversal approximately 8 weeks    HTN, hypothyroidism  - stable  - continue outpatient medications      Subjective/Objective   Chief Complaint: pain    Subjective:  Patient is feeling well  Complains of some incisional pain  Otherwise he is tolerating a regular diet without nausea or vomiting  No fevers or chills  Out of bed  Urinating well  Using incentive spirometer  Objective:     Blood pressure 138/80, pulse 95, temperature 98 3 °F (36 8 °C), temperature source Oral, resp  rate 18, height 6' (1 829 m), SpO2 98 %  ,Body mass index is 22 11 kg/m²        Intake/Output Summary (Last 24 hours) at 3/19/2021 1027  Last data filed at 3/19/2021 0427  Gross per 24 hour   Intake 1072 ml   Output 1525 ml   Net -453 ml       Invasive Devices     Peripheral Intravenous Line            Peripheral IV 03/17/21 Left Hand 2 days          Drain            Colostomy Loop  days    Closed/Suction Drain Left LUQ Bulb 15 Fr  1 day                Physical Exam:   General appearance: alert and oriented, in no acute distress  Head: Normocephalic, without obvious abnormality, atraumatic, sclerae anicteric, mucous membranes moist  Neck: no JVD and supple, symmetrical, trachea midline  Lungs: clear to auscultation, no wheezes or rales  Heart:   Regular rate and rhythm, S1-S2 normal, no murmur  Abdomen:   Flat, soft,  Tender over incision sites, active bowel sounds   Drain with blood-tinged output  Extremities:   No edema, redness or tenderness in the calves or thighs  Skin: Warm, dry; incision sites clean, dry intact  Nursing notes and vital signs reviewed      Lab, Imaging and other studies:  I have personally reviewed pertinent lab results    , CBC:   Lab Results   Component Value Date    WBC 11 12 (H) 03/19/2021    HGB 10 6 (L) 03/19/2021    HCT 32 4 (L) 03/19/2021    MCV 95 03/19/2021     03/19/2021    MCH 31 0 03/19/2021    MCHC 32 7 03/19/2021    RDW 14 0 03/19/2021    MPV 8 7 (L) 03/19/2021    NRBC 0 03/19/2021   , CMP:   Lab Results   Component Value Date    SODIUM 141 03/19/2021    K 3 7 03/19/2021     03/19/2021    CO2 27 03/19/2021    BUN 4 (L) 03/19/2021    CREATININE 0 65 03/19/2021    CALCIUM 8 2 (L) 03/19/2021    EGFR 112 03/19/2021     VTE Pharmacologic Prophylaxis: Heparin  VTE Mechanical Prophylaxis: sequential compression device     Gelacio Rodriguez PA-C

## 2021-03-19 NOTE — PLAN OF CARE
Problem: Potential for Falls  Goal: Patient will remain free of falls  Description: INTERVENTIONS:  - Assess patient frequently for physical needs  -  Identify cognitive and physical deficits and behaviors that affect risk of falls  -  [place] fall precautions as indicated by assessment   - Educate patient/family on patient safety including physical limitations  - Instruct patient to call for assistance with activity based on assessment  - Modify environment to reduce risk of injury  - Consider OT/PT consult to assist with strengthening/mobility  Outcome: Progressing     Problem: Nutrition/Hydration-ADULT  Goal: Nutrient/Hydration intake appropriate for improving, restoring or maintaining nutritional needs  Description: Monitor and assess patient's nutrition/hydration status for malnutrition  Collaborate with interdisciplinary team and initiate plan and interventions as ordered  Monitor patient's weight and dietary intake as ordered or per policy  Utilize nutrition screening tool and intervene as necessary  Determine patient's food preferences and provide high-protein, high-caloric foods as appropriate       INTERVENTIONS:  - Monitor oral intake, urinary output, labs, and treatment plans  - Assess nutrition and hydration status and recommend course of action  - Evaluate amount of meals eaten  - Assist patient with eating if necessary   - Allow adequate time for meals  - Recommend/ encourage appropriate diets, oral nutritional supplements, and vitamin/mineral supplements  - Order, calculate, and assess calorie counts as needed  - Recommend, monitor, and adjust tube feedings and TPN/PPN based on assessed needs  - Assess need for intravenous fluids  - Provide specific nutrition/hydration education as appropriate  - Include patient/family/caregiver in decisions related to nutrition  Outcome: Progressing     Problem: PAIN - ADULT  Goal: Verbalizes/displays adequate comfort level or baseline comfort level  Description: Interventions:  - Encourage patient to monitor pain and request assistance  - Assess pain using appropriate pain scale  - Administer analgesics based on type and severity of pain and evaluate response  - Implement non-pharmacological measures as appropriate and evaluate response  - Consider cultural and social influences on pain and pain management  - Notify physician/advanced practitioner if interventions unsuccessful or patient reports new pain  Outcome: Progressing     Problem: INFECTION - ADULT  Goal: Absence or prevention of progression during hospitalization  Description: INTERVENTIONS:  - Assess and monitor for signs and symptoms of infection  - Monitor lab/diagnostic results  - Monitor all insertion sites, i e  indwelling lines, tubes, and drains  - Monitor endotracheal if appropriate and nasal secretions for changes in amount and color  - [place] appropriate cooling/warming therapies per order  - Administer medications as ordered  - Instruct and encourage patient and family to use good hand hygiene technique  - Identify and instruct in appropriate isolation precautions for identified infection/condition  Outcome: Progressing  Goal: Absence of fever/infection during neutropenic period  Description: INTERVENTIONS:  - Monitor WBC    Outcome: Progressing     Problem: SAFETY ADULT  Goal: Patient will remain free of falls  Description: INTERVENTIONS:  - Assess patient frequently for physical needs  -  Identify cognitive and physical deficits and behaviors that affect risk of falls    -  [place] fall precautions as indicated by assessment   - Educate patient/family on patient safety including physical limitations  - Instruct patient to call for assistance with activity based on assessment  - Modify environment to reduce risk of injury  - Consider OT/PT consult to assist with strengthening/mobility  Outcome: Progressing  Goal: Maintain or return to baseline ADL function  Description: INTERVENTIONS:  -  Assess patient's ability to carry out ADLs; assess patient's baseline for ADL function and identify physical deficits which impact ability to perform ADLs (bathing, care of mouth/teeth, toileting, grooming, dressing, etc )  - Assess/evaluate cause of self-care deficits   - Assess range of motion  - Assess patient's mobility; develop plan if impaired  - Assess patient's need for assistive devices and provide as appropriate  - Encourage maximum independence but intervene and supervise when necessary  - Involve family in performance of ADLs  - Assess for home care needs following discharge   - Consider OT consult to assist with ADL evaluation and planning for discharge  - Provide patient education as appropriate  Outcome: Progressing  Goal: Maintain or return mobility status to optimal level  Description: INTERVENTIONS:  - Assess patient's baseline mobility status (ambulation, transfers, stairs, etc )    - Identify cognitive and physical deficits and behaviors that affect mobility  - Identify mobility aids required to assist with transfers and/or ambulation (gait belt, sit-to-stand, lift, walker, cane, etc )  - [place] fall precautions as indicated by assessment  - Record patient progress and toleration of activity level on Mobility SBAR; progress patient to next Phase/Stage  - Instruct patient to call for assistance with activity based on assessment  - Consider rehabilitation consult to assist with strengthening/weightbearing, etc   Outcome: Progressing     Problem: DISCHARGE PLANNING  Goal: Discharge to home or other facility with appropriate resources  Description: INTERVENTIONS:  - Identify barriers to discharge w/patient and caregiver  - Arrange for needed discharge resources and transportation as appropriate  - Identify discharge learning needs (meds, wound care, etc )  - Arrange for interpretive services to assist at discharge as needed  - Refer to Case Management Department for coordinating discharge planning if the patient needs post-hospital services based on physician/advanced practitioner order or complex needs related to functional status, cognitive ability, or social support system  Outcome: Progressing     Problem: Knowledge Deficit  Goal: Patient/family/caregiver demonstrates understanding of disease process, treatment plan, medications, and discharge instructions  Description: Complete learning assessment and assess knowledge base    Interventions:  - Provide teaching at level of understanding  - Provide teaching via preferred learning methods  Outcome: Progressing

## 2021-03-19 NOTE — PLAN OF CARE
Problem: Potential for Falls  Goal: Patient will remain free of falls  Description: INTERVENTIONS:  - Assess patient frequently for physical needs  -  Identify cognitive and physical deficits and behaviors that affect risk of falls    -  [place] fall precautions as indicated by assessment   - Educate patient/family on patient safety including physical limitations  - Instruct patient to call for assistance with activity based on assessment  - Modify environment to reduce risk of injury  - Consider OT/PT consult to assist with strengthening/mobility  3/19/2021 1209 by Markus Almanza RN  Outcome: Adequate for Discharge  3/19/2021 0743 by Markus Almanza RN  Outcome: Progressing

## 2021-03-19 NOTE — OP NOTE
OPERATIVE REPORT  PATIENT NAME: Hector Mckeon    :  1970  MRN: [id number]  Pt Location: [facility] OR ROOM 02    SURGERY DATE: 3/17/2021    Surgeon(s) and Role:     * Giorgio Johnson MD - Primary     * Balwinder Rodriguez PA-C - Assisting     * Ronda Nguyen PA-C - Assisting    Preop Diagnosis:  Sigmoid diverticulitis [K57 32]  Colostomy in place [facility]) [Z93 3]    Post-Op Diagnosis Codes:     * Sigmoid diverticulitis [K57 32]     * Colostomy in place ([facility] [address] ) [Z93 3]    Procedure(s) (LRB):  LAPAROSCOPY DIAGNOSTIC (N/A)  RESECTION COLON SIGMOID LAPAROSCOPIC, EXTENSIVE LYSIS OF ADHESIONS, T[place]TDON OF SPLENIC FELXURE, LOW ANTERIOR RESECTION WITH COLORECTAL ANASTOMOSIS, INTEROPERATIVE COLONOSCOPY, OPEN REPAIR OF INCISIONAL HERNIA (N/A)    Specimen(s):  ID Type Source Tests Collected by Time Destination   1 : Sigmoid Colon With Anastomotic Rings Tissue Large Intestine, Sigmoid Colon TISSUE EXAM Giorgio Johnson MD 3/17/2021 1419        Estimated Blood Loss:   50 mL    Drains:  Closed/Suction Drain Left LUQ Bulb 15 Fr  (Active)   Site Description Unable to view 21   Dressing Status Clean;Dry; Intact 21   Drainage Appearance Bloody 21   Status To bulb suction 21 09   Intake (mL) 0 mL 21 1545   Output (mL) 20 mL 21 1918   Number of days: 2       Colostomy Loop RLQ (Active)   Stomal Appliance 2 piece 21 09   Stoma Assessment [place] 21 09   Output (mL) 100 mL 21 1412   Number of days: 137       [REMOVED] Closed/Suction Drain Right RLQ Bulb 15 Fr  (Removed)   Number of days: 156       [REMOVED] NG/OG/Enteral Tube Nasogastric 18 Fr Left nares (Removed)   Number of days: 0       [REMOVED] Urethral Catheter Latex 16 Fr   (Removed)   Site Assessment Clean;Skin intact 21 1752   Collection Container Standard drainage bag 21 175   Securement Method Tape 21 1525   Output (mL) 350 mL 21 2330   Number of days: 1       Anesthesia Type: General    Operative Indications:  Sigmoid diverticulitis [K57 32]  Colostomy in place [facility]) [Z93 3]      Operative Findings:  Incisional hernia  Extensive adhesions  Severe diverticular disease with inflammatory reaction down to rectum    Complications:   None    Procedure and Technique:  The patient, after proper facial and armband identification and time out, was placed under general anesthesia with endotracheal tube in satisfactory condition  The patient was then  changed in modified lithotomy position and the abdominal wall was prepped by ChloraPrep and perineum with Betadine  Love was inserted  The patient was draped in the sterile usual manner  Through Umbilical access a meng trocar was placed under open technique and was inserted into the abdominal cavity and abdomen was insufflated  Two 5mm trocars were placed in the left abdomen and extensive adhesions were seen and extensive lysis of adhesions was performed using scissors and harmonic scalpel, this lysis of adhesions added 60 minutes to the case  Another 5-mm port in the right upper quadrant and a 12 mm port was inserted in the right lower quadrant  Abdominal laparoscopic exploratory was done with the liver normal in appearance  The small bowel is normal  The colon was viable  The sigmoid colon was adhered deep into the pelvis and through the process of dissection the site of the stricture was noted of the colon to the side abdominal wall probably from previous acute diverticulitis and abscess formation that has cleared by scar formation and the stricture  Both ureters were identified and protected throughout the suture  Retroperitoneal medial to lateral dissection was done  The left ureter was identified and the dissection of the sigmoid colon and descending colon were made on the retroperitoneal area and dissected free from the renal attachments laterally to the lateral wall   The inferior mesenteric artery below the left colic artery, but above the sigmoidal branches, was identified  This was transected using Harmonic scalpel and ligated with Vicryl as needed  The colon was completely freed to the pelvis  In the pelvis the sigmoid colon was adhered to the lateral pelvic wall  The dense adhesion with scar formation was sharply dissected and the sigmoid colon could be brought out of the pelvis for better anatomical position outside of the pelvis  There was extensive inflammation extending down on the rectum and a low anterior resection was needed  The peritoneum was incised using a harmonic to mobilize the rectum below the area of inflammation  The rectal mesentery was transected below the area of the inflammation in preparation for transection of the rectum  [place] 60 mm stapler with blue stapler was inserted through the 12 mm port and placed on the rectum and the rectum was transected securely without any problem and the low anterior resection was complete  A midline incision 4 cm in length at the level of the umbilicus The fascia was opened and the peritoneum was opened longitudinally and the wound protector was inserted  The colon could be delivered outside the abdominal cavity through this wound protector  The site of anastomosis was depicted on the descending colon where there was no further diverticular disease the bowel lumen and the wall was well pliable  A 2-0 Prolene pursestring suture was applied on the descending colon satisfactorily  [facility] staple instrument anvil was placed above the pursestring and tied securely with satisfactory pursestring around the rods  The bowel was replaced anatomically and the abdominal cavity and the air insufflation of the abdomen was reinstituted  Descending colon would not reach into the pelvis for the anastomosis so a hamonic scalpel was used to takedown the splenic flexure   Once the splenic flexure was taken down the descending colon would reach the top of the rectum without any tension in both end of the bowels had shown satisfactory arterial blood supply  The perineal  inserted the [facility] stapler gun through the anus, which was easily brought up to the stapled stump of the rectum and on opening the spike, the anvil was placed on the spike and closing it the reach of the 2 ends were satisfactory without any tension  The gun was fired and anastomosis was achieved  Both doughnuts were circular and complete  Using a flexible colonoscope and insufflation of the rectum under water, the anastomosis showed no air bubble and the anastomosis seemed quite satisfactory with well formed staples and no bleeding  The abdominal cavity was irrigated clean with a large amount of saline solution  Hemostasis was well affected  Sponges and counts were correct  Gown and gloves were changed and with new instruments abdominal cavity was closed The 12 mm port site was closed using 0 Vicryl suture laparoscopically  At this point attention was turned to the incisional hernia in the previous midline incision  Dissection was carried down to the hernia sac located above the fascia and mobilized from surrounding structures  Intact fascia was identified circumferentially around the defect  The hernia sac was isolated out down to the level of the fascial defect  Hernia sac was opened and its contents reduced  Excess hernia sac was excised and sent for pathology evaluation  Extensive dissection was carried out to lyse adhesions anteriorly and posteriorly to the fascia  Flaps anterior to the fascia were also created in order to place the suture  Dissection was minimized as much as possible to prevent interfering with the blood supply of the musculature  A retrorectus space was created using cautery  The posterior fascia was closed using PDS sutures   The mesh was selected to fit the appropriate defect with at least 3-5 cm of overlap and placed in the retrorectus space  PDS sutures were used and placed under direct vision in a circumferential fashion  The midline fascia was closed using a 4-1 technique and a #1 PDS suture  This allowed for a tension-free repair  The wound was irrigated  The subcutaneous layers were closed, attaching the subcutaneous tissues to the fascia, using interrupted 2-0 Vicryl suture, and then 3-0 Vicryl suture  The skin was closed using staples  Drain was placed in the retrorectus space and brought out through separate stab incision, and then secured using a nylon suture  The subcutaneous area was irrigated clean and the skin was closed using 4-0 Monocryl subcuticular running suture and/or staples  The patient tolerated the procedure well and was transferred to the recovery room in satisfactory condition        I was present for the entire procedure    Patient Disposition:  PACU     SIGNATURE: Delvin Primrose, MD  DATE: March 19, 2021  TIME: 9:54 AM

## 2021-03-19 NOTE — DISCHARGE SUMMARY
Discharge Summary - Romel Barnett 45 y o  male MRN: [id number]    Unit/Bed#: -01 Encounter: [id number]    Admission Date: 3/17/2021     Admitting Diagnosis: Sigmoid diverticulitis [K57 32]  Colostomy in place [facility]) [Z93 3]    Discharge Date:  03/19/21      Consulting Physicians: there were no consulting physicians on this admission    HPI:  Patient is a 54-year-old male with history of complicated diverticulitis with perforation and abscess  He was admitted in October with a long course complicated by sepsis and multiple abscesses  Who underwent abdominal washout with drainage and right-sided colostomy on 11/02/2021  Patient was readmitted for sigmoid colon colectomy with possible colostomy reversal     Procedures Performed:   Laparoscopic LAR with colorectal anastomosis, takedown of splenic flexure, lysis of adhesions, intraoperative colonoscopy, TAP block, open repair of incisional hernia with biologic mesh    Hospital Course:  Patient was admitted and taken to the operating room for the above-stated procedure  Patient was found to have an incisional hernia at his midline  He was also noted to have a right inguinal hernia  Patient had significant diverticular disease of his descending colon  His sigmoid colon was adhered to the sidewall from previous perforation and inflammation  Sigmoid colon was able to be removed but patient did require a low anterior resection due to significant disease  He did have some diverticular disease remaining in his descending colon  Splenic flexure was taken down and anastomosis was completed without complications  Due to the  low anastomosis and higher risk of leaking, of the right-sided colostomy was left in place  Will allow  the anastomosis to heal and plan for colostomy reversal in approximately 8 weeks  Postoperative  Hospital course was uneventful  The patient did well  He remained stable throughout his hospital course   His pain was well controlled  His diet was advanced to regular without issues  His colostomy was functioning well  He was out of bed and ambulating  He was using his incentive spirometer  He was urinating well  He was cleared for discharge to home on postoperative day 2  Patient will continue routine colostomy care  He does have a drain in place in the hernia repair above mesh placement  This will remain in place  Patient will monitor and record output  He will return in 2 weeks for re-evaluation and removal of drain  The patient will use Tylenol and ibuprofen for pain control as needed  He was given oxycodone for breakthrough pain control  He will follow activity and lifting restrictions  He did receive a full set of written discharge instructions which were reviewed with him prior to discharge  He  Will call with any questions or concerns  Of note, as above the patient did have a right inguinal hernia noted intra-operatively  Will follow as outpatient  Discharge Diagnosis:   1  S/P partial colectomy    2  Sigmoid diverticulitis    3  Colostomy in place [facility])    4  S/P left colectomy    Hypothyroidism  HTN  HLD    Condition at Discharge: stable     Discharge Medications: See after visit summary for reconciled discharge medications provided to patient and family  Discharge instructions/Information to patient and family:   See after visit summary for information provided to patient and family  Provisions for Follow-Up Care:  See after visit summary for information related to follow-up care and any pertinent home health orders  Disposition: Home    Planned Readmission: No    Discharge Statement   I spent 20 minutes discharging the patient  This time was spent on the day of discharge  I had direct contact with the patient on the day of discharge  Additional documentation is required if more than 30 minutes were spent on discharge  This text is generated with voice recognition software  There may be translation, syntax,  or grammatical errors  If you have any questions, please contact the dictating provider       Signature:   Valdemar Rodriguez PA-C  Date: 3/19/2021 Time: 11:54 AM

## 2021-03-23 NOTE — UTILIZATION REVIEW
Notification of Discharge  This is a Notification of Discharge from our facility [address] [place] [address]  Please be advised that this patient has been discharge from our facility  Below you will find the admission and discharge date and time including the patients disposition  PRESENTATION DATE: 3/17/2021  6:43 AM  OBS ADMISSION DATE:   IP ADMISSION DATE: 3/17/21 1618   DISCHARGE DATE: 3/19/2021  2:03 PM  DISPOSITION: Home/Self Care Home/Self Care   Admission Orders listed below:  Admission Orders (From admission, onward)     Ordered        03/17/21 1617  Inpatient Admission  Once                   Please contact the UR Department if additional information is required to close this patient's authorization/case  Tone Vergara  [facility] Utilization Review Department  Main: [phone] x carefully listen to the prompts  All voicemails are confidential   [email or web address]  Send all requests for admission clinical reviews, approved or denied determinations and any other requests to dedicated fax number below belonging to the campus where the patient is receiving treatment   List of dedicated fax numbers:  [address] DENIALS (Administrative/Medical Necessity) [phone]   [address] (Maternity/NICU/Pediatrics) [phone]   Ranjeet Saha [phone]   Keshia Promise [phone]   [facility] [phone]   Jael Bell [facility] [address] [facility] [phone]   [facility]  [phone]   [address] [facility] [address]  [address] [address] [address] [facility] [phone]

## 2021-03-25 ENCOUNTER — OFFICE VISIT (OUTPATIENT)
Dept: SURGERY | Facility: HOSPITAL | Age: 51
End: 2021-03-25

## 2021-03-25 ENCOUNTER — TELEPHONE (OUTPATIENT)
Dept: SURGERY | Facility: HOSPITAL | Age: 51
End: 2021-03-25

## 2021-03-25 VITALS — BODY MASS INDEX: 21.48 KG/M2 | WEIGHT: 158.6 LBS | HEIGHT: 72 IN | TEMPERATURE: 97.1 F

## 2021-03-25 DIAGNOSIS — Z09 POSTOP CHECK: Primary | ICD-10-CM

## 2021-03-25 PROCEDURE — 99024 POSTOP FOLLOW-UP VISIT: CPT | Performed by: SURGERY

## 2021-03-25 NOTE — TELEPHONE ENCOUNTER
Two day post op call  Spoke to patient  Patient is doing well  No questions or concerns  Set up post op appointments for both drain removal and post op  Reminded patient to call if there are any questions or concerns prior to post op appointment

## 2021-03-29 DIAGNOSIS — Z23 ENCOUNTER FOR IMMUNIZATION: ICD-10-CM

## 2021-04-07 ENCOUNTER — IMMUNIZATIONS (OUTPATIENT)
Dept: FAMILY MEDICINE CLINIC | Facility: HOSPITAL | Age: 51
End: 2021-04-07

## 2021-04-07 DIAGNOSIS — Z23 ENCOUNTER FOR IMMUNIZATION: Primary | ICD-10-CM

## 2021-04-07 PROCEDURE — 91300 SARS-COV-2 / COVID-19 MRNA VACCINE (PFIZER-BIONTECH) 30 MCG: CPT

## 2021-04-07 PROCEDURE — 0001A SARS-COV-2 / COVID-19 MRNA VACCINE (PFIZER-BIONTECH) 30 MCG: CPT

## 2021-04-12 NOTE — PROGRESS NOTES
Seen and examined no acute events doing well  avss afebrile  Soft +BS ND NT incision cdi ostomy in place    S/p lap LAR    Cont light duty for two weeks, f/u in 8 weeks to discussed reversal of right side colostomy

## 2021-04-19 ENCOUNTER — OFFICE VISIT (OUTPATIENT)
Dept: SURGERY | Facility: HOSPITAL | Age: 51
End: 2021-04-19

## 2021-04-19 VITALS
HEIGHT: 72 IN | HEART RATE: 114 BPM | TEMPERATURE: 97.7 F | DIASTOLIC BLOOD PRESSURE: 94 MMHG | SYSTOLIC BLOOD PRESSURE: 128 MMHG | BODY MASS INDEX: 21.48 KG/M2 | RESPIRATION RATE: 16 BRPM | WEIGHT: 158.6 LBS

## 2021-04-19 DIAGNOSIS — Z09 POSTOP CHECK: Primary | ICD-10-CM

## 2021-04-19 PROCEDURE — 99024 POSTOP FOLLOW-UP VISIT: CPT | Performed by: SURGERY

## 2021-04-22 NOTE — PROGRESS NOTES
Seen and examined presents for prolapse of colostomy  avss afebrile  Soft +BS ND NT ostomy prolapse although no edema very easily reducible    Ostomy prolapse - this does not appear edematous or worrisome and likely will recur, suggested pt reduce at colostomy appliance changes, if pain or swelling or edema patient should f/u with me immediately otherwise cont observation

## 2021-04-26 ENCOUNTER — OFFICE VISIT (OUTPATIENT)
Dept: SURGERY | Facility: HOSPITAL | Age: 51
End: 2021-04-26

## 2021-04-26 VITALS
WEIGHT: 157.6 LBS | HEIGHT: 72 IN | SYSTOLIC BLOOD PRESSURE: 119 MMHG | TEMPERATURE: 97.8 F | DIASTOLIC BLOOD PRESSURE: 76 MMHG | HEART RATE: 113 BPM | RESPIRATION RATE: 16 BRPM | BODY MASS INDEX: 21.35 KG/M2

## 2021-04-26 DIAGNOSIS — Z93.3 COLOSTOMY STATUS (HCC): Primary | ICD-10-CM

## 2021-04-26 PROCEDURE — 3008F BODY MASS INDEX DOCD: CPT | Performed by: SURGERY

## 2021-04-26 PROCEDURE — 99024 POSTOP FOLLOW-UP VISIT: CPT | Performed by: PHYSICIAN ASSISTANT

## 2021-04-26 NOTE — PROGRESS NOTES
Assessment/Plan:   Christophe Lakhani is a 45 y o male who comes in today for  Prolapsed colostomy site  Patient has been seen for this on many occasions  States his colostomy prolapsed was reduced by Dr Malachi Rodriguez last week  States it popped out shortly after leaving the office  It has now been out for a few days  Complains of some discomfort related to this  Ostomy remains functional   He is afraid to remove the bag because he feels he will not be able to get the colostomy bag back on  He is uncomfortable with reducing the colostomy  Prolapse himself  On exam colostomy is prolapsed approximately 5-6 inches  Ostomy is functional with brown stool  No bleeding or significant edema  Sugar was placed on the bowel to reduce edema  Prolapse was reduced without incident  Colostomy bag reapplied  Patient instructed to avoid heavy lifting or straining  Patient instructed to use stool softeners to avoid constipation  Patient instructed to continue to call with any questions or concerns  Patient is scheduled for reversal of colostomy in approximately 2 weeks  HPI:  Christophe Lakhani is a 45 y o male who comes in today for   Prolapsed colostomy  As above  Patient states colostomy has been prolapse for a few days  He has not tried to reduce the colostomy prolapse  States he is uncomfortable with this  States he has some discomfort related to this  Otherwise colostomy has remained functional   No bleeding  No abdominal distension  No nausea or vomiting  No fevers or chills  He is anxious to have his colostomy reversed        ROS:  General ROS: negative for - chills, fatigue, fever or night sweats, weight loss  Respiratory ROS: no cough, shortness of breath, or wheezing  Cardiovascular ROS: no chest pain or dyspnea on exertion  Abdomen ROS: as per HPiI  Genito-Urinary ROS: no dysuria, trouble voiding, or hematuria  Musculoskeletal ROS: negative for - gait disturbance, joint pain or muscle pain  Neurological ROS: no TIA or stroke symptoms  Skin ros: no rashes, lesions, open wounds    ALLERGIES  Patient has no known allergies      Current Outpatient Medications:     Coenzyme Q10 (CO Q 10 PO), Take by mouth, Disp: , Rfl:     LEVOTHYROXINE SODIUM PO, Take 75 mcg by mouth daily , Disp: , Rfl:     Olmesartan-amLODIPine-HCTZ (TRIBENZOR PO), Take by mouth every evening Pt reports dose is 40-10-25mg, Disp: , Rfl:     potassium chloride (MICRO-K) 10 MEQ CR capsule, Take 10 mEq by mouth 2 (two) times a day, Disp: , Rfl:     SIMVASTATIN PO, Take 20 mg by mouth every evening , Disp: , Rfl:     acetaminophen (TYLENOL) 325 mg tablet, Take 2 tablets (650 mg total) by mouth every 6 (six) hours as needed for mild pain (Patient not taking: Reported on 4/19/2021), Disp: 60 tablet, Rfl: 0    ferrous sulfate 325 (65 Fe) mg tablet, Take 325 mg by mouth daily with breakfast, Disp: , Rfl:     ibuprofen (MOTRIN) 200 mg tablet, Take 3 tablets (600 mg total) by mouth every 6 (six) hours as needed for moderate pain (Patient not taking: Reported on 4/19/2021), Disp: 60 tablet, Rfl: 0  Past Medical History:   Diagnosis Date    Disease of thyroid gland     Diverticulitis     Hypertension      Past Surgical History:   Procedure Laterality Date    ABDOMINAL ADHESION SURGERY N/A 10/12/2020    Procedure: LYSIS ADHESIONS extensive;  Surgeon: Carin Hurt MD;  Location:  MAIN OR;  Service: General    COLON SURGERY      COLOSTOMY  11/02/2020    COLOSTOMY      IR DRAINAGE TUBE CHECK AND/OR REMOVAL  11/13/2020    IR DRAINAGE TUBE PLACEMENT  10/23/2020    IR DRAINAGE TUBE PLACEMENT  11/27/2020    IR PICC PLACEMENT DOUBLE LUMEN  10/16/2020    VT LAP, SURG CLOSE ENTEROSTOMY RESECT ANAST N/A 3/17/2021    Procedure: RESECTION COLON SIGMOID LAPAROSCOPIC, EXTENSIVE LYSIS OF ADHESIONS, TAKTDON OF SPLENIC FELXURE, LOW ANTERIOR RESECTION WITH COLORECTAL ANASTOMOSIS, INTEROPERATIVE COLONOSCOPY, OPEN REPAIR OF INCISIONAL HERNIA;  Surgeon: Shakila Angela MD;  Location: [facility];  Service: General    IN LAP,DIAGNOSTIC ABDOMEN N/A 10/12/2020    Procedure: LAPAROSCOPY DIAGNOSTIC, drainage of pelvic abcess, placement drain;  Surgeon: Shakila Angela MD;  Location: [facility];  Service: General    IN LAP,DIAGNOSTIC ABDOMEN N/A 11/2/2020    Procedure: LAPAROSCOPY DIAGNOSTIC, colostomy, repair of enterotomy;  Surgeon: Shakila Angela MD;  Location: [facility];  Service: General    IN LAP,DIAGNOSTIC ABDOMEN N/A 3/17/2021    Procedure: LAPAROSCOPY DIAGNOSTIC;  Surgeon: Shakila Angela MD;  Location: [facility];  Service: General     Family History   Problem Relation Age of Onset    Diabetes Mother     Colon cancer Father     Colon polyps Father     Heart disease Father     Diabetes Father     Diabetes Brother       reports that he has quit smoking  He has never used smokeless tobacco  He reports current alcohol use  He reports current drug use  Frequency: 7 00 times per week  Drug: Marijuana      PHYSICAL EXAM    Vitals:    04/26/21 1426   BP: 119/76   Pulse: (!) 113   Resp: 16   Temp: 97 8 °F (36 6 °C)       General: normal, cooperative, no distress, slightly anxious  Abdominal: soft, nondistended or nontender    Colostomy prolapse approximately 5-6 inches with stool output, mild edema, no bleeding    Chestine Jagdeep Rodriguez PA-C

## 2021-04-30 ENCOUNTER — IMMUNIZATIONS (OUTPATIENT)
Dept: FAMILY MEDICINE CLINIC | Facility: HOSPITAL | Age: 51
End: 2021-04-30

## 2021-04-30 DIAGNOSIS — Z23 ENCOUNTER FOR IMMUNIZATION: Primary | ICD-10-CM

## 2021-04-30 PROCEDURE — 0002A SARS-COV-2 / COVID-19 MRNA VACCINE (PFIZER-BIONTECH) 30 MCG: CPT

## 2021-04-30 PROCEDURE — 91300 SARS-COV-2 / COVID-19 MRNA VACCINE (PFIZER-BIONTECH) 30 MCG: CPT

## 2021-05-06 ENCOUNTER — OFFICE VISIT (OUTPATIENT)
Dept: SURGERY | Facility: HOSPITAL | Age: 51
End: 2021-05-06

## 2021-05-06 ENCOUNTER — TRANSCRIBE ORDERS (OUTPATIENT)
Dept: ADMINISTRATIVE | Facility: HOSPITAL | Age: 51
End: 2021-05-06

## 2021-05-06 ENCOUNTER — LAB (OUTPATIENT)
Dept: LAB | Facility: HOSPITAL | Age: 51
End: 2021-05-06
Payer: COMMERCIAL

## 2021-05-06 DIAGNOSIS — Z09 POSTOP CHECK: Primary | ICD-10-CM

## 2021-05-06 DIAGNOSIS — Z93.3 COLOSTOMY STATUS (HCC): ICD-10-CM

## 2021-05-06 DIAGNOSIS — Z00.00 EXAMINATION: Primary | ICD-10-CM

## 2021-05-06 DIAGNOSIS — K57.32 DIVERTICULITIS OF SIGMOID COLON: ICD-10-CM

## 2021-05-06 DIAGNOSIS — Z00.00 EXAMINATION: ICD-10-CM

## 2021-05-06 LAB
ABO GROUP BLD: NORMAL
APTT PPP: 33 SECONDS (ref 23–37)
BLD GP AB SCN SERPL QL: NEGATIVE
EST. AVERAGE GLUCOSE BLD GHB EST-MCNC: 103 MG/DL
HBA1C MFR BLD: 5.2 %
INR PPP: 1.02 (ref 0.84–1.19)
PROTHROMBIN TIME: 13.4 SECONDS (ref 11.6–14.5)
RH BLD: POSITIVE
SPECIMEN EXPIRATION DATE: NORMAL

## 2021-05-06 PROCEDURE — 99024 POSTOP FOLLOW-UP VISIT: CPT | Performed by: SURGERY

## 2021-05-06 PROCEDURE — 83036 HEMOGLOBIN GLYCOSYLATED A1C: CPT

## 2021-05-06 PROCEDURE — 86850 RBC ANTIBODY SCREEN: CPT | Performed by: SURGERY

## 2021-05-06 PROCEDURE — 36415 COLL VENOUS BLD VENIPUNCTURE: CPT

## 2021-05-06 PROCEDURE — 86900 BLOOD TYPING SEROLOGIC ABO: CPT | Performed by: SURGERY

## 2021-05-06 PROCEDURE — 85610 PROTHROMBIN TIME: CPT

## 2021-05-06 PROCEDURE — 85730 THROMBOPLASTIN TIME PARTIAL: CPT

## 2021-05-06 PROCEDURE — 86901 BLOOD TYPING SEROLOGIC RH(D): CPT | Performed by: SURGERY

## 2021-05-07 NOTE — H&P (VIEW-ONLY)
Seen and examined for prolapse of colostomy  avss afebrile  Soft +BS ND NT easy prolospe reduction    Reducing prolapsing stoma easily, gave patient instruction on followup, plan for takedown of colostomy in under two weeks

## 2021-05-11 NOTE — PRE-PROCEDURE INSTRUCTIONS
Pre-Surgery Instructions:   Medication Instructions    LEVOTHYROXINE SODIUM PO Take morning of surgery with sip of water    Olmesartan-amLODIPine-HCTZ (TRIBENZOR PO) Takes at night- instructed to take night before surgery    potassium chloride (MICRO-K) 10 MEQ CR capsule Take up to day before surgery- HOLD morning dose DOS    SIMVASTATIN PO Takes at night - take night before surgery  Reviewed all medications and instructions for DOS  Reviewed all showering instructions and COVID visitation policy  Pt aware that  [facility] is location for DOS, instructed that pt pre op nurse will call on 5/17/21  to give specific instructions for DOS  Pt instructed to bring photo ID and insurance card for DOS, remove all jewelry and  NO valuables for DOS  Pt instructed to use only Tylenol between now 5/11/21  and DOS, NO NSAID products  Pt informed transport is needed for DOS due to receiving anesthesia  Instructed patient to reduce smoking prior to surgery  No smoking day of surgery  (Marijuana) Instructed pt to minimize alcohol use prior to surgery as well, to reduce risk of dehydration  Pt verbalized understanding of all instructions given and reviewed for DOS  My Surgical Experience    The following information was developed to assist you to prepare for your operation  What do I need to do before coming to the hospital?   Arrange for a responsible person to drive you to and from the hospital    Arrange care for your children at home  Children are not allowed in the recovery areas of the hospital   Plan to wear clothing that is easy to put on and take off  If you are having shoulder surgery, wear a shirt that buttons or zippers in the front  Bathing  o Shower the evening before and the morning of your surgery with an antibacterial soap   Please refer to the Pre Op Showering Instructions for Surgery Patients Sheet   o Remove nail polish and all body piercing jewelry  o Do not shave any body part for at least 24 hours before surgery-this includes face, arms, legs and upper body  Food  o Nothing to eat or drink after midnight the night before your surgery  This includes candy and chewing gum  o Exception: If your surgery is after 12:00pm (noon), you may have clear liquids such as 7-Up®, ginger ale, apple or cranberry juice, Jell-O®, water, or clear broth until 8:00 am  o Do not drink milk or juice with pulp on the morning before surgery  o Do not drink alcohol 24 hours before surgery  Medicine  o Follow instructions you received from your surgeon about which medicines you may take on the day of surgery  o If instructed to take medicine on the morning of surgery, take pills with just a small sip of water  Call your prescribing doctor for specific infroamtion on what to do if you take insulin    What should I bring to the hospital?    Bring:  Ray Hightower or a walker, if you have them, for foot or knee surgery   A list of the daily medicines, vitamins, minerals, herbals and nutritional supplements you take  Include the dosages of medicines and the time you take them each day   Glasses, dentures or hearing aids   Minimal clothing; you will be wearing hospital sleepwear   Photo ID; required to verify your identity   If you have a Living Will or Power of , bring a copy of the documents   If you have an ostomy, bring an extra pouch and any supplies you use    Do not bring   Medicines or inhalers   Money, valuables or jewelry    What other information should I know about the day of surgery?  Notify your surgeons if you develop a cold, sore throat, cough, fever, rash or any other illness   Report to the Ambulatory Surgical/Same Day Surgery Unit   You will be instructed to stop at Registration only if you have not been pre-registered   Inform your  fi they do not stay that they will be asked by the staff to leave a phone number where they can be reached   Be available to be reached before surgery   In the event the operating room schedule changes, you may be asked to come in earlier or later than expected    *It is important to tell your doctor and others involved in your health care if you are taking or have been taking any non-prescription drugs, vitamins, minerals, herbals or other nutritional supplements   Any of these may interact with some food or medicines and cause a reaction

## 2021-05-18 ENCOUNTER — ANESTHESIA (OUTPATIENT)
Dept: PERIOP | Facility: HOSPITAL | Age: 51
DRG: 330 | End: 2021-05-18
Payer: COMMERCIAL

## 2021-05-18 ENCOUNTER — ANESTHESIA EVENT (OUTPATIENT)
Dept: PERIOP | Facility: HOSPITAL | Age: 51
DRG: 330 | End: 2021-05-18
Payer: COMMERCIAL

## 2021-05-18 ENCOUNTER — HOSPITAL ENCOUNTER (INPATIENT)
Facility: HOSPITAL | Age: 51
LOS: 1 days | Discharge: HOME | DRG: 330 | End: 2021-05-19
Attending: SURGERY | Admitting: SURGERY
Payer: COMMERCIAL

## 2021-05-18 DIAGNOSIS — K57.32 DIVERTICULITIS OF SIGMOID COLON: ICD-10-CM

## 2021-05-18 DIAGNOSIS — Z93.3 COLOSTOMY STATUS (HCC): ICD-10-CM

## 2021-05-18 PROCEDURE — 0DTF0ZZ RESECTION OF RIGHT LARGE INTESTINE, OPEN APPROACH: ICD-10-PCS | Performed by: SURGERY

## 2021-05-18 PROCEDURE — 0DSM0ZZ REPOSITION DESCENDING COLON, OPEN APPROACH: ICD-10-PCS | Performed by: SURGERY

## 2021-05-18 PROCEDURE — C9290 INJ, BUPIVACAINE LIPOSOME: HCPCS | Performed by: SURGERY

## 2021-05-18 PROCEDURE — 88304 TISSUE EXAM BY PATHOLOGIST: CPT | Performed by: PATHOLOGY

## 2021-05-18 PROCEDURE — 44625 REPAIR BOWEL OPENING: CPT | Performed by: SURGERY

## 2021-05-18 RX ORDER — GABAPENTIN 300 MG/1
300 CAPSULE ORAL 3 TIMES DAILY
Status: DISCONTINUED | OUTPATIENT
Start: 2021-05-18 | End: 2021-05-19 | Stop reason: HOSPADM

## 2021-05-18 RX ORDER — HYDROMORPHONE HCL/PF 1 MG/ML
0.5 SYRINGE (ML) INJECTION
Status: DISCONTINUED | OUTPATIENT
Start: 2021-05-18 | End: 2021-05-19 | Stop reason: HOSPADM

## 2021-05-18 RX ORDER — SODIUM CHLORIDE 9 MG/ML
75 INJECTION, SOLUTION INTRAVENOUS CONTINUOUS
Status: DISCONTINUED | OUTPATIENT
Start: 2021-05-18 | End: 2021-05-19

## 2021-05-18 RX ORDER — OXYCODONE HYDROCHLORIDE 5 MG/1
5 TABLET ORAL EVERY 4 HOURS PRN
Status: DISCONTINUED | OUTPATIENT
Start: 2021-05-18 | End: 2021-05-19 | Stop reason: HOSPADM

## 2021-05-18 RX ORDER — MIDAZOLAM HYDROCHLORIDE 2 MG/2ML
INJECTION, SOLUTION INTRAMUSCULAR; INTRAVENOUS AS NEEDED
Status: DISCONTINUED | OUTPATIENT
Start: 2021-05-18 | End: 2021-05-18

## 2021-05-18 RX ORDER — AMLODIPINE BESYLATE 5 MG/1
10 TABLET ORAL EVERY EVENING
Status: DISCONTINUED | OUTPATIENT
Start: 2021-05-18 | End: 2021-05-19 | Stop reason: HOSPADM

## 2021-05-18 RX ORDER — ACETAMINOPHEN 325 MG/1
975 TABLET ORAL EVERY 6 HOURS SCHEDULED
Status: DISCONTINUED | OUTPATIENT
Start: 2021-05-18 | End: 2021-05-19 | Stop reason: HOSPADM

## 2021-05-18 RX ORDER — ONDANSETRON 2 MG/ML
4 INJECTION INTRAMUSCULAR; INTRAVENOUS EVERY 6 HOURS PRN
Status: DISCONTINUED | OUTPATIENT
Start: 2021-05-18 | End: 2021-05-19 | Stop reason: HOSPADM

## 2021-05-18 RX ORDER — FENTANYL CITRATE/PF 50 MCG/ML
25 SYRINGE (ML) INJECTION
Status: DISCONTINUED | OUTPATIENT
Start: 2021-05-18 | End: 2021-05-18 | Stop reason: HOSPADM

## 2021-05-18 RX ORDER — CEFAZOLIN SODIUM 2 G/50ML
2000 SOLUTION INTRAVENOUS ONCE
Status: COMPLETED | OUTPATIENT
Start: 2021-05-18 | End: 2021-05-18

## 2021-05-18 RX ORDER — HEPARIN SODIUM 5000 [USP'U]/ML
5000 INJECTION, SOLUTION INTRAVENOUS; SUBCUTANEOUS EVERY 8 HOURS SCHEDULED
Status: DISCONTINUED | OUTPATIENT
Start: 2021-05-18 | End: 2021-05-19 | Stop reason: HOSPADM

## 2021-05-18 RX ORDER — NEOSTIGMINE METHYLSULFATE 1 MG/ML
INJECTION INTRAVENOUS AS NEEDED
Status: DISCONTINUED | OUTPATIENT
Start: 2021-05-18 | End: 2021-05-18

## 2021-05-18 RX ORDER — ACETAMINOPHEN 325 MG/1
975 TABLET ORAL ONCE
Status: COMPLETED | OUTPATIENT
Start: 2021-05-18 | End: 2021-05-18

## 2021-05-18 RX ORDER — HYDROMORPHONE HCL/PF 1 MG/ML
0.5 SYRINGE (ML) INJECTION
Status: DISCONTINUED | OUTPATIENT
Start: 2021-05-18 | End: 2021-05-18 | Stop reason: HOSPADM

## 2021-05-18 RX ORDER — CHLORHEXIDINE GLUCONATE 0.12 MG/ML
15 RINSE ORAL ONCE
Status: COMPLETED | OUTPATIENT
Start: 2021-05-18 | End: 2021-05-18

## 2021-05-18 RX ORDER — PROPOFOL 10 MG/ML
INJECTION, EMULSION INTRAVENOUS AS NEEDED
Status: DISCONTINUED | OUTPATIENT
Start: 2021-05-18 | End: 2021-05-18

## 2021-05-18 RX ORDER — ROCURONIUM BROMIDE 10 MG/ML
INJECTION, SOLUTION INTRAVENOUS AS NEEDED
Status: DISCONTINUED | OUTPATIENT
Start: 2021-05-18 | End: 2021-05-18

## 2021-05-18 RX ORDER — LEVOTHYROXINE SODIUM 0.07 MG/1
75 TABLET ORAL
Status: DISCONTINUED | OUTPATIENT
Start: 2021-05-19 | End: 2021-05-19 | Stop reason: HOSPADM

## 2021-05-18 RX ORDER — SODIUM CHLORIDE, SODIUM LACTATE, POTASSIUM CHLORIDE, CALCIUM CHLORIDE 600; 310; 30; 20 MG/100ML; MG/100ML; MG/100ML; MG/100ML
125 INJECTION, SOLUTION INTRAVENOUS CONTINUOUS
Status: DISCONTINUED | OUTPATIENT
Start: 2021-05-18 | End: 2021-05-18

## 2021-05-18 RX ORDER — GLYCOPYRROLATE 0.2 MG/ML
INJECTION INTRAMUSCULAR; INTRAVENOUS AS NEEDED
Status: DISCONTINUED | OUTPATIENT
Start: 2021-05-18 | End: 2021-05-18

## 2021-05-18 RX ORDER — MAGNESIUM HYDROXIDE 1200 MG/15ML
LIQUID ORAL AS NEEDED
Status: DISCONTINUED | OUTPATIENT
Start: 2021-05-18 | End: 2021-05-18 | Stop reason: HOSPADM

## 2021-05-18 RX ORDER — GABAPENTIN 300 MG/1
300 CAPSULE ORAL ONCE
Status: COMPLETED | OUTPATIENT
Start: 2021-05-18 | End: 2021-05-18

## 2021-05-18 RX ORDER — OXYCODONE HYDROCHLORIDE 5 MG/1
10 TABLET ORAL EVERY 4 HOURS PRN
Status: DISCONTINUED | OUTPATIENT
Start: 2021-05-18 | End: 2021-05-19 | Stop reason: HOSPADM

## 2021-05-18 RX ORDER — FENTANYL CITRATE 50 UG/ML
INJECTION, SOLUTION INTRAMUSCULAR; INTRAVENOUS AS NEEDED
Status: DISCONTINUED | OUTPATIENT
Start: 2021-05-18 | End: 2021-05-18

## 2021-05-18 RX ORDER — BUPIVACAINE HYDROCHLORIDE 5 MG/ML
INJECTION, SOLUTION EPIDURAL; INTRACAUDAL AS NEEDED
Status: DISCONTINUED | OUTPATIENT
Start: 2021-05-18 | End: 2021-05-18 | Stop reason: HOSPADM

## 2021-05-18 RX ORDER — ONDANSETRON 2 MG/ML
INJECTION INTRAMUSCULAR; INTRAVENOUS AS NEEDED
Status: DISCONTINUED | OUTPATIENT
Start: 2021-05-18 | End: 2021-05-18

## 2021-05-18 RX ADMIN — SODIUM CHLORIDE 125 ML/HR: 0.9 INJECTION, SOLUTION INTRAVENOUS at 23:55

## 2021-05-18 RX ADMIN — PROPOFOL 200 MG: 10 INJECTION, EMULSION INTRAVENOUS at 13:01

## 2021-05-18 RX ADMIN — MIDAZOLAM 2 MG: 1 INJECTION INTRAMUSCULAR; INTRAVENOUS at 11:10

## 2021-05-18 RX ADMIN — PHENYLEPHRINE HYDROCHLORIDE 50 MCG: 10 INJECTION INTRAVENOUS at 13:16

## 2021-05-18 RX ADMIN — ACETAMINOPHEN 975 MG: 325 TABLET, FILM COATED ORAL at 17:42

## 2021-05-18 RX ADMIN — ROCURONIUM BROMIDE 10 MG: 10 INJECTION, SOLUTION INTRAVENOUS at 13:50

## 2021-05-18 RX ADMIN — OXYCODONE HYDROCHLORIDE 10 MG: 5 TABLET ORAL at 19:41

## 2021-05-18 RX ADMIN — MIDAZOLAM 2 MG: 1 INJECTION INTRAMUSCULAR; INTRAVENOUS at 12:57

## 2021-05-18 RX ADMIN — PHENYLEPHRINE HYDROCHLORIDE 100 MCG: 10 INJECTION INTRAVENOUS at 13:33

## 2021-05-18 RX ADMIN — CHLORHEXIDINE GLUCONATE 0.12% ORAL RINSE 15 ML: 1.2 LIQUID ORAL at 10:45

## 2021-05-18 RX ADMIN — ACETAMINOPHEN 975 MG: 325 TABLET, FILM COATED ORAL at 23:51

## 2021-05-18 RX ADMIN — HYDROMORPHONE HYDROCHLORIDE 0.5 MG: 1 INJECTION, SOLUTION INTRAMUSCULAR; INTRAVENOUS; SUBCUTANEOUS at 18:39

## 2021-05-18 RX ADMIN — PHENYLEPHRINE HYDROCHLORIDE 50 MCG: 10 INJECTION INTRAVENOUS at 13:11

## 2021-05-18 RX ADMIN — FENTANYL CITRATE 50 MCG: 50 INJECTION, SOLUTION INTRAMUSCULAR; INTRAVENOUS at 13:50

## 2021-05-18 RX ADMIN — GLYCOPYRROLATE 0.6 MG: 0.2 INJECTION, SOLUTION INTRAMUSCULAR; INTRAVENOUS at 14:44

## 2021-05-18 RX ADMIN — CEFAZOLIN SODIUM 2000 MG: 2 SOLUTION INTRAVENOUS at 13:25

## 2021-05-18 RX ADMIN — METRONIDAZOLE 500 MG: 500 INJECTION, SOLUTION INTRAVENOUS at 13:14

## 2021-05-18 RX ADMIN — SODIUM CHLORIDE, SODIUM LACTATE, POTASSIUM CHLORIDE, AND CALCIUM CHLORIDE: .6; .31; .03; .02 INJECTION, SOLUTION INTRAVENOUS at 14:55

## 2021-05-18 RX ADMIN — HEPARIN SODIUM 5000 UNITS: 5000 INJECTION INTRAVENOUS; SUBCUTANEOUS at 21:41

## 2021-05-18 RX ADMIN — FENTANYL CITRATE 25 MCG: 50 INJECTION, SOLUTION INTRAMUSCULAR; INTRAVENOUS at 15:12

## 2021-05-18 RX ADMIN — MIDAZOLAM 2 MG: 1 INJECTION INTRAMUSCULAR; INTRAVENOUS at 11:13

## 2021-05-18 RX ADMIN — FENTANYL CITRATE 100 MCG: 50 INJECTION, SOLUTION INTRAMUSCULAR; INTRAVENOUS at 12:58

## 2021-05-18 RX ADMIN — HYDROMORPHONE HYDROCHLORIDE 0.5 MG: 1 INJECTION, SOLUTION INTRAMUSCULAR; INTRAVENOUS; SUBCUTANEOUS at 21:41

## 2021-05-18 RX ADMIN — FENTANYL CITRATE 25 MCG: 50 INJECTION, SOLUTION INTRAMUSCULAR; INTRAVENOUS at 15:18

## 2021-05-18 RX ADMIN — SODIUM CHLORIDE 125 ML/HR: 0.9 INJECTION, SOLUTION INTRAVENOUS at 16:19

## 2021-05-18 RX ADMIN — GABAPENTIN 300 MG: 300 CAPSULE ORAL at 16:25

## 2021-05-18 RX ADMIN — PHENYLEPHRINE HYDROCHLORIDE 50 MCG: 10 INJECTION INTRAVENOUS at 13:18

## 2021-05-18 RX ADMIN — HYDROMORPHONE HYDROCHLORIDE 0.5 MG: 1 INJECTION, SOLUTION INTRAMUSCULAR; INTRAVENOUS; SUBCUTANEOUS at 15:26

## 2021-05-18 RX ADMIN — GABAPENTIN 300 MG: 300 CAPSULE ORAL at 10:45

## 2021-05-18 RX ADMIN — FENTANYL CITRATE 50 MCG: 50 INJECTION, SOLUTION INTRAMUSCULAR; INTRAVENOUS at 13:52

## 2021-05-18 RX ADMIN — AMLODIPINE BESYLATE 10 MG: 5 TABLET ORAL at 17:42

## 2021-05-18 RX ADMIN — ROCURONIUM BROMIDE 10 MG: 10 INJECTION, SOLUTION INTRAVENOUS at 14:05

## 2021-05-18 RX ADMIN — FENTANYL CITRATE 100 MCG: 50 INJECTION, SOLUTION INTRAMUSCULAR; INTRAVENOUS at 14:16

## 2021-05-18 RX ADMIN — SODIUM CHLORIDE, SODIUM LACTATE, POTASSIUM CHLORIDE, AND CALCIUM CHLORIDE 125 ML/HR: .6; .31; .03; .02 INJECTION, SOLUTION INTRAVENOUS at 10:53

## 2021-05-18 RX ADMIN — GABAPENTIN 300 MG: 300 CAPSULE ORAL at 21:41

## 2021-05-18 RX ADMIN — ONDANSETRON 4 MG: 2 INJECTION INTRAMUSCULAR; INTRAVENOUS at 14:28

## 2021-05-18 RX ADMIN — NEOSTIGMINE METHYLSULFATE 4 MG: 1 INJECTION, SOLUTION INTRAVENOUS at 14:44

## 2021-05-18 RX ADMIN — HYDROMORPHONE HYDROCHLORIDE 0.5 MG: 1 INJECTION, SOLUTION INTRAMUSCULAR; INTRAVENOUS; SUBCUTANEOUS at 15:40

## 2021-05-18 RX ADMIN — PHENYLEPHRINE HYDROCHLORIDE 100 MCG: 10 INJECTION INTRAVENOUS at 13:42

## 2021-05-18 RX ADMIN — ROCURONIUM BROMIDE 10 MG: 10 INJECTION, SOLUTION INTRAVENOUS at 14:15

## 2021-05-18 RX ADMIN — PHENYLEPHRINE HYDROCHLORIDE 100 MCG: 10 INJECTION INTRAVENOUS at 13:29

## 2021-05-18 RX ADMIN — SODIUM CHLORIDE, SODIUM LACTATE, POTASSIUM CHLORIDE, AND CALCIUM CHLORIDE: .6; .31; .03; .02 INJECTION, SOLUTION INTRAVENOUS at 13:45

## 2021-05-18 RX ADMIN — OXYCODONE HYDROCHLORIDE 10 MG: 5 TABLET ORAL at 23:51

## 2021-05-18 RX ADMIN — ROCURONIUM BROMIDE 50 MG: 10 INJECTION, SOLUTION INTRAVENOUS at 13:02

## 2021-05-18 RX ADMIN — ACETAMINOPHEN 975 MG: 325 TABLET, FILM COATED ORAL at 10:45

## 2021-05-18 NOTE — ANESTHESIA POSTPROCEDURE EVALUATION
Post-Op Assessment Note    CV Status:  Stable    Pain management: adequate     Mental Status:  Alert and awake   Hydration Status:  Euvolemic   PONV Controlled:  Controlled   Airway Patency:  Patent  Airway: intubated      Post Op Vitals Reviewed: Yes      Staff: CRNA   Reason for prolonged intubation > 24 hours:  N/aReason for prolonged intubation > 48 hours:  N/a      No complications documented      BP      Temp (P) 99 1 °F (37 3 °C) (05/18/21 1454)    Pulse (P) 97 (05/18/21 1454)   Resp (P) 20 (05/18/21 1454)    SpO2 (P) 100 % (05/18/21 1454)

## 2021-05-18 NOTE — INTERIM OP NOTE
RESECTION COLON RIGHT OPEN, RIGHT TAP BLOCK, COLOSTOMY TAKEDOWN / REVERSE MARQUEZ  Postoperative Note  PATIENT NAME: Luis Armando Brown  : 1970  MRN: [id number]  [facility] OR ROOM 02    Surgery Date: 2021    Preop Diagnosis:  Diverticulitis of sigmoid colon [K57 32]  Colostomy status ([facility] [address] ) [Z93 3]    Post-Op Diagnosis Codes:     * Diverticulitis of sigmoid colon [K57 32]     * Colostomy status ([facility] [address] ) [Z93 3]    Procedure(s) (LRB):  RESECTION COLON RIGHT OPEN, RIGHT TAP BLOCK (N/A)  COLOSTOMY TAKEDOWN / REVERSE MARQUEZ (N/A)    Surgeon(s) and Role:     * Ele Cisneros MD - Primary     * Chan Rodriguez PA-C - Assisting  Khadijah JONES    Specimens:  ID Type Source Tests Collected by Time Destination   1 : Right colon Tissue Large Intestine, Right/Ascending Colon TISSUE EXAM Ele Cisneros MD 2021 1355        Estimated Blood Loss:   Minimal    Anesthesia Type:   General ETA    Findings:   Both ends loop colostomy easily freed and retracted from Colostomy site  Open resection and colo-colo anastomosis completed without difficulty      Complications:   None    SIGNATURE: Chan Rodriguez PA-C   DATE: May 18, 2021   TIME: 3:14 PM

## 2021-05-19 VITALS
WEIGHT: 156 LBS | SYSTOLIC BLOOD PRESSURE: 129 MMHG | OXYGEN SATURATION: 100 % | HEIGHT: 72 IN | TEMPERATURE: 98 F | DIASTOLIC BLOOD PRESSURE: 76 MMHG | RESPIRATION RATE: 19 BRPM | HEART RATE: 85 BPM | BODY MASS INDEX: 21.13 KG/M2

## 2021-05-19 LAB
ANION GAP SERPL CALCULATED.3IONS-SCNC: 9 MMOL/L (ref 4–13)
BASOPHILS # BLD AUTO: 0.03 THOUSANDS/ΜL (ref 0–0.1)
BASOPHILS NFR BLD AUTO: 0 % (ref 0–1)
BUN SERPL-MCNC: 7 MG/DL (ref 5–25)
CALCIUM SERPL-MCNC: 8.4 MG/DL (ref 8.3–10.1)
CHLORIDE SERPL-SCNC: 106 MMOL/L (ref 100–108)
CO2 SERPL-SCNC: 26 MMOL/L (ref 21–32)
CREAT SERPL-MCNC: 0.73 MG/DL (ref 0.6–1.3)
EOSINOPHIL # BLD AUTO: 0.02 THOUSAND/ΜL (ref 0–0.61)
EOSINOPHIL NFR BLD AUTO: 0 % (ref 0–6)
ERYTHROCYTE [DISTWIDTH] IN BLOOD BY AUTOMATED COUNT: 12.8 % (ref 11.6–15.1)
GFR SERPL CREATININE-BSD FRML MDRD: 107 ML/MIN/1.73SQ M
GLUCOSE SERPL-MCNC: 112 MG/DL (ref 65–140)
HCT VFR BLD AUTO: 38.2 % (ref 36.5–49.3)
HGB BLD-MCNC: 12.5 G/DL (ref 12–17)
IMM GRANULOCYTES # BLD AUTO: 0.07 THOUSAND/UL (ref 0–0.2)
IMM GRANULOCYTES NFR BLD AUTO: 1 % (ref 0–2)
LYMPHOCYTES # BLD AUTO: 1.27 THOUSANDS/ΜL (ref 0.6–4.47)
LYMPHOCYTES NFR BLD AUTO: 10 % (ref 14–44)
MAGNESIUM SERPL-MCNC: 1.7 MG/DL (ref 1.6–2.6)
MCH RBC QN AUTO: 31.5 PG (ref 26.8–34.3)
MCHC RBC AUTO-ENTMCNC: 32.7 G/DL (ref 31.4–37.4)
MCV RBC AUTO: 96 FL (ref 82–98)
MONOCYTES # BLD AUTO: 1.2 THOUSAND/ΜL (ref 0.17–1.22)
MONOCYTES NFR BLD AUTO: 9 % (ref 4–12)
NEUTROPHILS # BLD AUTO: 10.36 THOUSANDS/ΜL (ref 1.85–7.62)
NEUTS SEG NFR BLD AUTO: 80 % (ref 43–75)
NRBC BLD AUTO-RTO: 0 /100 WBCS
PHOSPHATE SERPL-MCNC: 3.3 MG/DL (ref 2.7–4.5)
PLATELET # BLD AUTO: 288 THOUSANDS/UL (ref 149–390)
PMV BLD AUTO: 8.8 FL (ref 8.9–12.7)
POTASSIUM SERPL-SCNC: 4 MMOL/L (ref 3.5–5.3)
RBC # BLD AUTO: 3.97 MILLION/UL (ref 3.88–5.62)
SODIUM SERPL-SCNC: 141 MMOL/L (ref 136–145)
WBC # BLD AUTO: 12.95 THOUSAND/UL (ref 4.31–10.16)

## 2021-05-19 PROCEDURE — 80048 BASIC METABOLIC PNL TOTAL CA: CPT | Performed by: PHYSICIAN ASSISTANT

## 2021-05-19 PROCEDURE — 85025 COMPLETE CBC W/AUTO DIFF WBC: CPT | Performed by: PHYSICIAN ASSISTANT

## 2021-05-19 PROCEDURE — 83735 ASSAY OF MAGNESIUM: CPT | Performed by: PHYSICIAN ASSISTANT

## 2021-05-19 PROCEDURE — 99024 POSTOP FOLLOW-UP VISIT: CPT | Performed by: PHYSICIAN ASSISTANT

## 2021-05-19 PROCEDURE — 84100 ASSAY OF PHOSPHORUS: CPT | Performed by: PHYSICIAN ASSISTANT

## 2021-05-19 RX ORDER — OXYCODONE HYDROCHLORIDE 5 MG/1
5 TABLET ORAL EVERY 4 HOURS PRN
Qty: 15 TABLET | Refills: 0 | Status: SHIPPED | OUTPATIENT
Start: 2021-05-19 | End: 2021-05-19

## 2021-05-19 RX ORDER — OXYCODONE HYDROCHLORIDE 5 MG/1
5 TABLET ORAL EVERY 4 HOURS PRN
Qty: 15 TABLET | Refills: 0 | Status: SHIPPED | OUTPATIENT
Start: 2021-05-19 | End: 2021-05-22

## 2021-05-19 RX ADMIN — OXYCODONE HYDROCHLORIDE 10 MG: 5 TABLET ORAL at 12:19

## 2021-05-19 RX ADMIN — HYDROMORPHONE HYDROCHLORIDE 0.5 MG: 1 INJECTION, SOLUTION INTRAMUSCULAR; INTRAVENOUS; SUBCUTANEOUS at 07:16

## 2021-05-19 RX ADMIN — GABAPENTIN 300 MG: 300 CAPSULE ORAL at 08:18

## 2021-05-19 RX ADMIN — ACETAMINOPHEN 975 MG: 325 TABLET, FILM COATED ORAL at 05:52

## 2021-05-19 RX ADMIN — HYDROMORPHONE HYDROCHLORIDE 0.5 MG: 1 INJECTION, SOLUTION INTRAMUSCULAR; INTRAVENOUS; SUBCUTANEOUS at 12:20

## 2021-05-19 RX ADMIN — OXYCODONE HYDROCHLORIDE 10 MG: 5 TABLET ORAL at 07:16

## 2021-05-19 RX ADMIN — HEPARIN SODIUM 5000 UNITS: 5000 INJECTION INTRAVENOUS; SUBCUTANEOUS at 05:53

## 2021-05-19 RX ADMIN — LEVOTHYROXINE SODIUM 75 MCG: 75 TABLET ORAL at 05:53

## 2021-05-19 NOTE — INTERVAL H&P NOTE
H&P reviewed  After examining the patient I find no changes in the patients condition since the H&P had been written      Vitals:    05/19/21 0704   BP: 129/76   Pulse: 85   Resp: 19   Temp: 98 °F (36 7 °C)   SpO2: 100%

## 2021-05-19 NOTE — UTILIZATION REVIEW
m Initial Clinical Review    Elective inpatient  surgical procedure    Age/Sex: 45 y o  male     Surgery Date: 5/18/2021     Procedure: RESECTION COLON RIGHT OPEN, RIGHT TAP BLOCK (N/A)  COLOSTOMY TAKEDOWN / REVERSE MARQUEZ    Anesthesia: genera;    Operative Findings: Both ends loop colostomy easily freed and retracted from Colostomy site  Open resection and colo-colo anastomosis completed without difficulty  POD#1 Progress Note: 5/19/2021:  Patient is post operative day one status post resection colon right open, right tap block, colostomy takedown/reverse marquez  Pain is controlled  Clears tolerated  No flatus  Love dc and urinating  On exam abdomen soft, distended, hypoactive bowel sounds  Incision c/d/i with carlos  Plan is advance diet this pm, ambulate, pain control       Admission Orders: Date/Time/Statement:   Admission Orders (From admission, onward)     Ordered        05/18/21 1529  Inpatient Admission  Once                   Orders Placed This Encounter   Procedures    Inpatient Admission     Standing Status:   Standing     Number of Occurrences:   1     Order Specific Question:   Level of Care     Answer:   Med Surg [16]     Order Specific Question:   Estimated length of stay     Answer:   More than 2 Midnights     Order Specific Question:   Certification     Answer:   I certify that inpatient services are medically necessary for this patient for a duration of greater than two midnights  See H&P and MD Progress Notes for additional information about the patient's course of treatment       Vital Signs: /76 (BP Location: Right arm)   Pulse 85   Temp 98 °F (36 7 °C) (Oral)   Resp 19   Ht 6' (1 829 m)   Wt 70 8 kg (156 lb)   SpO2 100%   BMI 21 16 kg/m²   05/19/21 0704  98 °F (36 7 °C)  85  19  129/76  97  100 %  --  --  None (Room air)  Lying   05/18/21 2354  98 3 °F (36 8 °C)  106Abnormal   20  123/81  98  97 %  --  --  None (Room air)  Lying   05/18/21 1919  --  107Abnormal 18  122/80  96  98 %  --  --  None (Room air)  --   05/18/21 1641  97 8 °F (36 6 °C)  94  14  125/71  92  96 %  --  --  None (Room air)  Lying   05/18/21 1554  --  88  18  132/71  --  95 %  --  --  None (Room air)  --   05/18/21 1548  98 9 °F (37 2 °C)  86  19  128/71  --  93 %  --  --  None (Room air)         Pertinent Labs/Diagnostic Test Results:  No imaging    Results from last 7 days   Lab Units 05/19/21  0557   WBC Thousand/uL 12 95*   HEMOGLOBIN g/dL 12 5   HEMATOCRIT % 38 2   PLATELETS Thousands/uL 288   NEUTROS ABS Thousands/µL 10 36*     Results from last 7 days   Lab Units 05/19/21  0557   SODIUM mmol/L 141   POTASSIUM mmol/L 4 0   CHLORIDE mmol/L 106   CO2 mmol/L 26   ANION GAP mmol/L 9   BUN mg/dL 7   CREATININE mg/dL 0 73   EGFR ml/min/1 73sq m 107   CALCIUM mg/dL 8 4   MAGNESIUM mg/dL 1 7   PHOSPHORUS mg/dL 3 3     Results from last 7 days   Lab Units 05/19/21  0557   GLUCOSE RANDOM mg/dL 112       Diet: 5/18/2021 clears   5/19/2021: surgical soft lite meal     Mobility:  Up as tolerated  DVT Prophylaxis: Heparin    Bilateral SCD    Medications/Pain Control:   Scheduled Medications:  acetaminophen, 975 mg, Oral, Q6H LEO  amLODIPine, 10 mg, Oral, QPM  gabapentin, 300 mg, Oral, TID  heparin (porcine), 5,000 Units, Subcutaneous, Q8H [address]  levothyroxine, 75 mcg, Oral, Early Morning      Continuous IV Infusions:  sodium chloride, 125 mL/hr, Intravenous, Continuous      PRN Meds:  HYDROmorphone, 0 5 mg, Intravenous, Q3H PRN - used x 2 5/18, x 1 5/19  lactated ringers, 1,000 mL, Intravenous, Once PRN    And  lactated ringers, 1,000 mL, Intravenous, Once PRN  ondansetron, 4 mg, Intravenous, Q6H PRN  oxyCODONE, 10 mg, Oral, Q4H PRN - used x 2 5/18, x 1 5/19  oxyCODONE, 5 mg, Oral, Q4H PRN  sodium chloride, 1,000 mL, Intravenous, Once PRN    And  sodium chloride, 1,000 mL, Intravenous, Once PRN        Network Utilization Review Department  ATTENTION: Please call with any questions or concerns to [phone] and carefully listen to the prompts so that you are directed to the right person  All voicemails are confidential   Dede Whitman all requests for admission clinical reviews, approved or denied determinations and any other requests to dedicated fax number below belonging to the campus where the patient is receiving treatment   List of dedicated fax numbers for the Facilities:  [address] DENIALS (Administrative/Medical Necessity) [phone]   [address] (Maternity/NICU/Pediatrics) [phone]   [address] [place] [id number] [place] [address] [address] [place] [address] [phone]

## 2021-05-19 NOTE — DISCHARGE INSTRUCTIONS
Marcia Shah Instructions  Dr Charles Chun MD, [facility]  [phone]    1  General: You will feel pulling sensations around the wound or funny aches and pains around the incisions  This is normal  Even minor surgery is a change in your body and this is your body's way of reacting to it  If you have had abdominal surgery, it may help to support the incision with a small pillow or blanket for comfort when moving or coughing  2  Wound care:  Okay to shower  The glue will fall off over the next week or 2  Use ice for the first 5 days after surgery  Do not use for longer than 20 minutes at a time  Use ice 5 times per day  3  Water: You may shower over the wounds  Do not bathe or use a pool or hot tub until cleared by the physician  If you were discharged with a drain, make sure drain site is covered with plastic wrap before showering  4  Activity: You may go up and down stairs, walk as much as you are comfortable, but walk at least 3 times each day  If you have had abdominal surgery, do not lift anything heavier than 15 lbs for the 1st 2 weeks and 25 lbs for weeks 3 and 4      5  Diet: You may resume a low fiber/low residue diet for next two weeks until you see the surgeon    6  Medications: Resume all of your previous medications, unless told otherwise by the doctor  Avoid aspirin products for 2-3 days after the date of surgery  You may, at that time, begin to take them again  Use Tylenol and Ibuprofen for pain control  You may alternate these medications every 3 hours  For example: you may take Tylenol at noon, Ibuprofen at 3:00 p m , and Tylenol again at 6:00 p m , etc   You should use ice to assist with pain control as above  You do not need to take narcotic pain medication unless you are having significant pain  If you were prescribed a narcotic pain medication containing Tylenol, such as Percocet or Norco, do not use supplemental Tylenol      7  Driving: You will need someone to drive you home on the day of surgery or discharge  Do not drive or make any important decisions while on narcotic pain medication or 24 hours and after anesthesia or sedation for surgery  Generally, you may drive when your off all narcotic pain medications and you are comfortable  8  Upset Stomach: You may take Maalox, Tums, or similar items for an upset stomach  If your narcotic pain medication causes an upset stomach, do not take it on an empty stomach  Try taking it with at least some crackers or toast      9  Constipation: Patients often experience constipation after surgery  You may take over-the-counter medication for this, such as Metamucil, Senokot, Dulcolax, milk of magnesia, etc  You may NOT take a suppository  If you experience significant nausea or vomiting after abdominal surgery, call the office before trying any of these medications  10  Call the office: If you are experiencing any of the following: fevers above 101 5°, significant nausea or vomiting, if the wound develops drainage and/or there is excessive redness around the wound, or if you have significant diarrhea, diarrhea with blood or other worsening symptoms  11  Pain: You may be given a prescription for pain medication  This will be sent to your pharmacy prior to discharge  12  Sexual Activity: You may resume sexual activity when you feel ready and comfortable and your incision is sealed and healed without apparent infection risk  13  Urination: If you have not urinated in 6 hours, go directly to the ER for evaluation for urinary retention  14  Follow-up in 2 weeks

## 2021-05-19 NOTE — OP NOTE
OPERATIVE REPORT  PATIENT NAME: Brian Nuno    :  1970  MRN: [id number]  Pt Location: [facility] OR ROOM 02    SURGERY DATE: 2021    Surgeon(s) and Role:     * Layla Holbrook MD - Primary     Orly Rodriguez PA-C - Assisting    Preop Diagnosis:  Diverticulitis of sigmoid colon [K57 32]  Colostomy status ([address] ) [Z93 3]    Post-Op Diagnosis Codes:     * Diverticulitis of sigmoid colon [K57 32]     * Colostomy status ([address] ) [Z93 3]    Procedure   Open right colon resection, takedown and reversal of colostomy, right sided TAP block  Specimen(s):  ID Type Source Tests Collected by Time Destination   1 : Right colon Tissue Large Intestine, Right/Ascending Colon TISSUE EXAM Layla Holbrook MD 2021 1355        Estimated Blood Loss:   Minimal    Drains:  [REMOVED] Closed/Suction Drain Left LUQ Bulb 15 Fr  (Removed)   Number of days: 62       [REMOVED] Colostomy Loop RLQ (Removed)   Number of days: 429       Anesthesia Type:   General    Operative Indications:  Diverticulitis of sigmoid colon [K57 32]  Colostomy status ([address] ) [Z93 3]      Operative Findings:  Redundant right colon    Complications:   None    Procedure and Technique:  After the patient was taken back to the operating room placed on operative table in supine position general anesthesia was induced a time-out was performed with the name of patient and procedure performed  The abdomen is prepped and draped in usual sterile fashion after the colostomy had been sewn shut with a running suture  An elliptical incision was made around the colostomy with a scalpel and this was deepened down through the subcutaneous tissues using cautery  The fascia was identified and incised with cautery  Adhesions to the anterior abdominal wall were dissected with blunt and sharp dissection to allow mobilization of the right colon    A point was selected proximal and distal to the loop colostomy and a Anderson Norma was passed between the bowel and mesentery to create 2 windows  The bowel was then stapled using an Endo-KRISS stapler with blue loads proximally at the TI and distally at the hepatic flexure colon  The mesentery was then divided between Christine Athens clamps and ligated using 0 Vicryl ties  The segment of small bowel and colon including the colostomywas passed off  At this point the 2 ends of were approximated using interrupted 3 0 silk sutures the mesenteric defect was also closed with 3 0 silk suture  Enterotomies were made in the distal portion of the each limb of bowel  An Endo-KRISS was then placed through the enterotomies and fired to create the anastomosis  The staple line was inspected and appeared intact and the common enterotomy was then closed with a running 3 0 PDS suture  This suture line was then reinforced with 3 0 silk Lemberts in interrupted fashion  The anastomosis was then placed back into the abdomen  The abdomen was irrigated and the fluid returned clear  The abdominal wall was then closed using running PDS  in the posterior and anterior layers of the fascia separately  The subcutaneous tissues were then irrigated and approximated with interrupted 3 0 Vicryl  The skin was closed loosely with staples and then Betadine soaked carlos were placed into the subcutaneous tissues between the staples    The wound was dressed and the patient was taken to PACU in stable condition     I was present for the entire procedure       I was present for the entire procedure    Patient Disposition:  PACU     SIGNATURE: Ele Cisneros MD  DATE: May 19, 2021  TIME: 2:43 PM

## 2021-05-19 NOTE — PROGRESS NOTES
Progress Note - Valarie Westfall 45 y o  male MRN: [id number]    Unit/Bed#: -01 Encounter: [id number]      Assessment/Plan:-  POD 1 resection of colon right open, right tap block, colostomy takedown and reverse Pedro  - Patient underwent Lap LAR with colorectal anastomosis, SAMARA, takedown of splenic flexure on 3/17  He was brought back yesterday for colostomy takedown  -Doing well, pain controlled  -Tolerating clears, ADAT to surgical soft this afternoon  -No flatus, cont to watch  -Ambulate, incentive spirometer  -DVT prophylaxis      Subjective:   Doing well this am  Pain well controlled  Tolerating clear diet  No N/V  Love out  Urinating well  No flatus    Objective:     Vitals: Blood pressure 129/76, pulse 85, temperature 98 °F (36 7 °C), temperature source Oral, resp  rate 19, height 6' (1 829 m), weight 70 8 kg (156 lb), SpO2 100 %  ,Body mass index is 21 16 kg/m²  Intake/Output Summary (Last 24 hours) at 5/19/2021 0853  Last data filed at 5/19/2021 0557  Gross per 24 hour   Intake 3200 ml   Output 2675 ml   Net 525 ml       Physical Exam:   Constitutional: NAD, appears stated age  Heart:RRR  Lungs: CTAB  Abdomen:- soft, mildly distended, hypoactive BS  Incision c//d/i with carlos  Extremities: no edema, no rashes      Invasive Devices     Peripheral Intravenous Line            Peripheral IV 05/18/21 Right Forearm less than 1 day                Lab, Imaging and other studies: I have personally reviewed pertinent reports      VTE Pharmacologic Prophylaxis: Heparin  VTE Mechanical Prophylaxis: sequential compression device

## 2021-05-19 NOTE — CASE MANAGEMENT
LOS: 1  Patient is not a 30 day readmission or a Medicare bundled patient  His risk for unplanned readmission is 10, Green  Met with patient and reviewed the discharge planning process including identifying help at home and patient preference for discharge  Patient reports residing with his wife in a 2 story home with 0 OMEGA  He reports being independent of ADL's and using no assistive device  He uses [employer] in [address] [place] [address] for his meds and reports no barriers in obtaining his meds  He has been followed by [facility] in the past   He denies MH/SNF/D&A rehab admission  His PCP is [facility]  He does not have a medical POA and is not interested in receiving information  Hie wife helps at home when needed  He plans to return home and anticipates no discharge needs  His wife will transport him home

## 2021-05-19 NOTE — PLAN OF CARE
Problem: DISCHARGE PLANNING - CARE MANAGEMENT  Goal: Discharge to post-acute care or home with appropriate resources  Description: INTERVENTIONS:  - Conduct assessment to determine patient/family and health care team treatment goals, and need for post-acute services based on payer coverage, community resources, and patient preferences, and barriers to discharge  - Address psychosocial, clinical, and financial barriers to discharge as identified in assessment in conjunction with the patient/family and health care team  - Arrange appropriate level of post-acute services according to patients   needs and preference and payer coverage in collaboration with the physician and health care team  - Communicate with and update the patient/family, physician, and health care team regarding progress on the discharge plan  - Arrange appropriate transportation to post-acute venues  Outcome: Progressing

## 2021-05-21 ENCOUNTER — TELEPHONE (OUTPATIENT)
Dept: SURGERY | Facility: HOSPITAL | Age: 51
End: 2021-05-21

## 2021-05-21 NOTE — UTILIZATION REVIEW
Notification of Discharge   This is a Notification of Discharge from our facility [address]  Please be advised that this patient has been discharge from our facility  Below you will find the admission and discharge date and time including the patients disposition  UTILIZATION REVIEW CONTACT:  Christopher Clifford  Utilization   Network Utilization Review Department  Phone: [phone] x carefully listen to the prompts  All voicemails are confidential   Email: [email or web address]     PHYSICIAN ADVISORY SERVICES:  FOR [id number] REVIEW - MEDICAL NECESSITY DENIAL  Phone: [phone]  Fax: [phone]  Email: [email or web address]     PRESENTATION DATE: 5/18/2021 10:15 AM  OBERVATION ADMISSION DATE:   INPATIENT ADMISSION DATE: 5/18/21 1529   DISCHARGE DATE: 5/19/2021  3:30 PM  DISPOSITION: Home/Self Care Home/Self Care      IMPORTANT INFORMATION:  Send all requests for admission clinical reviews, approved or denied determinations and any other requests to dedicated fax number below belonging to the campus where the patient is receiving treatment   List of dedicated fax numbers:  [address] DENIALS (Administrative/Medical Necessity) [phone]   [address] [place] [address] (Maternity/NICU/Pediatrics) [phone]   Ketty Jimenez [phone]   Mariusz Gentile [phone]   Ebonie Roberts [phone]   [address] [place] [address] 1525 [facility] [phone]   [facility]  [phone] 2205 [facility] [address]  2401 [facility] 1000 [address] [facility] [phone]

## 2021-05-21 NOTE — TELEPHONE ENCOUNTER
Two day post op call  Spoke to patient  Patient is doing well  Pain well controlled, patient ambulating well  Set up post op appointment  No other questions or concerns    Reminded patient of post op appointment and to call if there are any questions or concerns prior to appointment

## 2021-06-04 ENCOUNTER — OFFICE VISIT (OUTPATIENT)
Dept: SURGERY | Facility: HOSPITAL | Age: 51
End: 2021-06-04

## 2021-06-04 VITALS
SYSTOLIC BLOOD PRESSURE: 128 MMHG | WEIGHT: 162.2 LBS | BODY MASS INDEX: 21.97 KG/M2 | RESPIRATION RATE: 16 BRPM | HEART RATE: 103 BPM | TEMPERATURE: 97.2 F | HEIGHT: 72 IN | DIASTOLIC BLOOD PRESSURE: 88 MMHG

## 2021-06-04 DIAGNOSIS — Z98.890 POST-OPERATIVE STATE: Primary | ICD-10-CM

## 2021-06-04 PROCEDURE — 99024 POSTOP FOLLOW-UP VISIT: CPT | Performed by: PHYSICIAN ASSISTANT

## 2021-06-04 NOTE — PROGRESS NOTES
Assessment/Plan:   Lavonne Torres is a 45 y o male  With history of complicated diverticulitis with perforation and abscess  Patient underwent abdominal washout with drainage and right-sided colostomy placement in November of 2020  Patient was readmitted in March for LAR  With colorectal anastomosis  Colostomy remained in place due to low anastomosis  Patient readmitted on 05/18/21 for takedown of right colostomy with right colon resection and ileocolonic anastomosis  He presents for postoperative follow-up  Patient is feeling well  He complains of minimal incisional pain  He is eating well and has gained 5 lb  He is having regular bowel movements  He denies any fevers or chills  He has returned to normal activities  He is following lifting and activity restrictions  On exam patient's abdomen is benign  Incisional staples were removed intact and without difficulty  Incision is healing well  Pathology:Colon, colostomy (excision):  - Colon and skin with ostomy-site changes  - Benign appendix   - Six (6) reactive lymph nodes (0/6)  Reviewed with patient  Benign findings  Patient and is recovering well  He does not require further surgical follow-up  He may advance to high-fiber diet  He should continue to refrain from strenuous activity and lift nothing greater than 25 lb until 06/18/2021  At that point he may return to activity as tolerated  He should continue to call with any questions or concerns  All questions answered       HPI:  Lavonne Torres is a 45 y o male who comes in today for postoperative check after recent surgeries as above  Currently doing well without problems, no fever or chills,no nausea and no vomiting  Reports   He is tolerating a regular diet  States he has gained 5 lb  He is having regular bowel movements    He is following lifting activity restrictions    ROS:  General ROS: negative for - chills, fatigue, fever or night sweats, weight loss  Respiratory ROS: no cough, shortness of breath, or wheezing  Cardiovascular ROS: no chest pain or dyspnea on exertion  Abdomen ROS:as per HPI  Genito-Urinary ROS: no dysuria, trouble voiding, or hematuria  Musculoskeletal ROS: negative for - gait disturbance, joint pain or muscle pain  Neurological ROS: no TIA or stroke symptoms  Skin ROS: surgical incision    ALLERGIES  Patient has no known allergies      Current Outpatient Medications:     LEVOTHYROXINE SODIUM PO, Take 75 mcg by mouth daily Takes in the am, Disp: , Rfl:     Olmesartan-amLODIPine-HCTZ (TRIBENZOR PO), Take by mouth every evening Pt reports dose is 40-10-25mg, Disp: , Rfl:     potassium chloride (MICRO-K) 10 MEQ CR capsule, Take by mouth 2 (two) times a day 5/11/21 Pt takes 99 mg twice a day , Disp: , Rfl:     SIMVASTATIN PO, Take 20 mg by mouth every evening , Disp: , Rfl:     acetaminophen (TYLENOL) 325 mg tablet, Take 2 tablets (650 mg total) by mouth every 6 (six) hours as needed for mild pain (Patient not taking: Reported on 4/19/2021), Disp: 60 tablet, Rfl: 0    Coenzyme Q10 (CO Q 10 PO), Take by mouth 2 (two) times a day 5/11/21 Takes two times a day - hasn't t used in a couple months, Disp: , Rfl:     ferrous sulfate 325 (65 Fe) mg tablet, Take 325 mg by mouth daily with breakfast, Disp: , Rfl:     ibuprofen (MOTRIN) 200 mg tablet, Take 3 tablets (600 mg total) by mouth every 6 (six) hours as needed for moderate pain (Patient not taking: Reported on 4/19/2021), Disp: 60 tablet, Rfl: 0  Past Medical History:   Diagnosis Date    Disease of thyroid gland     Diverticulitis     Hypertension      Past Surgical History:   Procedure Laterality Date    ABDOMINAL ADHESION SURGERY N/A 10/12/2020    Procedure: LYSIS ADHESIONS extensive;  Surgeon: Charles Chun MD;  Location:  MAIN OR;  Service: General    COLON SURGERY      COLONOSCOPY      COLOSTOMY  11/02/2020    COLOSTOMY      COLOSTOMY TAKEDOWN/REVERSE MARQUEZ N/A 5/18/2021    Procedure: COLOSTOMY Jerrica Aquino / Agustin Sawyer;  Surgeon: Cristopher Jett MD;  Location: [facility];  Service: General    [address] N/A 5/18/2021    Procedure: RESECTION COLON RIGHT OPEN, RIGHT TAP BLOCK;  Surgeon: Cristopher Jett MD;  Location: [facility];  Service: General    IR DRAINAGE TUBE CHECK AND/OR REMOVAL  11/13/2020    IR DRAINAGE TUBE PLACEMENT  10/23/2020    IR DRAINAGE TUBE PLACEMENT  11/27/2020    IR PICC PLACEMENT DOUBLE LUMEN  10/16/2020    MA LAP, SURG CLOSE ENTEROSTOMY RESECT ANAST N/A 3/17/2021    Procedure: RESECTION COLON SIGMOID LAPAROSCOPIC, EXTENSIVE LYSIS OF ADHESIONS, TAKTDON OF SPLENIC FELXURE, LOW ANTERIOR RESECTION WITH COLORECTAL ANASTOMOSIS, INTEROPERATIVE COLONOSCOPY, OPEN REPAIR OF INCISIONAL HERNIA;  Surgeon: Cristopher Jett MD;  Location: [facility];  Service: General    MA LAP,DIAGNOSTIC ABDOMEN N/A 10/12/2020    Procedure: LAPAROSCOPY DIAGNOSTIC, drainage of pelvic abcess, placement drain;  Surgeon: Cristopher Jett MD;  [facility]: [facility];  Service: General    MA LAP,DIAGNOSTIC ABDOMEN N/A 11/2/2020    Procedure: LAPAROSCOPY DIAGNOSTIC, colostomy, repair of enterotomy;  Surgeon: Cristopher Jett MD;  [facility];  Service: General    MA LAP,DIAGNOSTIC ABDOMEN N/A 3/17/2021    Procedure: LAPAROSCOPY DIAGNOSTIC;  Surgeon: Cristopher Jett MD;  [facility]: [facility];  Service: General     Family History   Problem Relation Age of Onset    Diabetes Mother     Colon cancer Father     Colon polyps Father     Heart disease Father     Diabetes Father     Diabetes Brother       reports that he quit smoking about 4 years ago  His smoking use included cigarettes  He has a 7 50 pack-year smoking history  He quit smokeless tobacco use about 4 years ago  His smokeless tobacco use included chew  He reports current alcohol use  He reports current drug use  Frequency: 7 00 times per week  Drug: Marijuana      PHYSICAL EXAM    Vitals:    06/04/21 0920   BP: 128/88   Pulse: 103   Resp: 16   Temp: (!) 97 2 °F (36 2 °C)       General: normal, cooperative, no distress  Abdominal: soft, nondistended or nontender  Incision: clean, dry, and intact and healing well     surgical staples removed intact and without difficulty    Milad Rodriguez PA-C

## 2021-06-04 NOTE — PATIENT INSTRUCTIONS
Your recovering well following your recent surgery  You do not require further surgical follow-up  Continue to follow lifting activity restrictions as reviewed    Continue to call with any questions or concerns

## 2022-03-11 ENCOUNTER — OFFICE VISIT (OUTPATIENT)
Dept: SURGERY | Facility: HOSPITAL | Age: 52
End: 2022-03-11
Payer: COMMERCIAL

## 2022-03-11 ENCOUNTER — LAB (OUTPATIENT)
Dept: LAB | Facility: HOSPITAL | Age: 52
End: 2022-03-11
Attending: SURGERY
Payer: COMMERCIAL

## 2022-03-11 VITALS
HEIGHT: 72 IN | BODY MASS INDEX: 25.19 KG/M2 | DIASTOLIC BLOOD PRESSURE: 92 MMHG | TEMPERATURE: 98.5 F | HEART RATE: 118 BPM | RESPIRATION RATE: 16 BRPM | WEIGHT: 186 LBS | SYSTOLIC BLOOD PRESSURE: 159 MMHG

## 2022-03-11 DIAGNOSIS — K43.2 INCISIONAL HERNIA, WITHOUT OBSTRUCTION OR GANGRENE: ICD-10-CM

## 2022-03-11 DIAGNOSIS — K43.2 INCISIONAL HERNIA, WITHOUT OBSTRUCTION OR GANGRENE: Primary | ICD-10-CM

## 2022-03-11 LAB
ANION GAP SERPL CALCULATED.3IONS-SCNC: 7 MMOL/L (ref 4–13)
BUN SERPL-MCNC: 13 MG/DL (ref 5–25)
CALCIUM SERPL-MCNC: 9.1 MG/DL (ref 8.3–10.1)
CHLORIDE SERPL-SCNC: 101 MMOL/L (ref 100–108)
CO2 SERPL-SCNC: 27 MMOL/L (ref 21–32)
CREAT SERPL-MCNC: 0.96 MG/DL (ref 0.6–1.3)
GFR SERPL CREATININE-BSD FRML MDRD: 89 ML/MIN/1.73SQ M
GLUCOSE P FAST SERPL-MCNC: 126 MG/DL (ref 65–99)
POTASSIUM SERPL-SCNC: 3.7 MMOL/L (ref 3.5–5.3)
SODIUM SERPL-SCNC: 135 MMOL/L (ref 136–145)

## 2022-03-11 PROCEDURE — 80048 BASIC METABOLIC PNL TOTAL CA: CPT

## 2022-03-11 PROCEDURE — 99213 OFFICE O/P EST LOW 20 MIN: CPT | Performed by: SURGERY

## 2022-03-11 PROCEDURE — 36415 COLL VENOUS BLD VENIPUNCTURE: CPT

## 2022-03-18 NOTE — PROGRESS NOTES
Assessment/Plan:   Kimmy Velazquez is a 47 y o male who is here for Mass (Established patient who returns for evaluation of possible recurrent incisional hernia that was repaired without mesh on 3/17/21  Does have a soft palpable lump at right abdomen  Some discomfort when wearing tight pants  No n/v/f/c  No change in bladder or bowels )    Plan: Recurrent Incisional hernia -discussed operative vs conservative mgt, surgical approaches, risks and benefits and given patient's complex previous surgical history will obtain CT for preoperative planning  Preoperative Clearance: None    HPI:  Kimmy Velazquez is a 47 y o male who was referred for evaluation of Mass (Established patient who returns for evaluation of possible recurrent incisional hernia that was repaired without mesh on 3/17/21  Does have a soft palpable lump at right abdomen  Some discomfort when wearing tight pants  No n/v/f/c  No change in bladder or bowels )    Currently having bulge and pain  ROS:  General ROS: negative  negative for - chills, fatigue, fever or night sweats, weight loss  Respiratory ROS: no cough, shortness of breath, or wheezing  Cardiovascular ROS: no chest pain or dyspnea on exertion  Genito-Urinary ROS: no dysuria, trouble voiding, or hematuria  Musculoskeletal ROS: negative for - gait disturbance, joint pain or muscle pain  Neurological ROS: no TIA or stroke symptoms  abdominal pain    [unfilled]  Patient has no known allergies      Current Outpatient Medications:     LEVOTHYROXINE SODIUM PO, Take 75 mcg by mouth daily Takes in the am, Disp: , Rfl:     Olmesartan-amLODIPine-HCTZ (TRIBENZOR PO), Take by mouth every evening Pt reports dose is 40-10-25mg, Disp: , Rfl:     potassium chloride (MICRO-K) 10 MEQ CR capsule, Take by mouth 2 (two) times a day 5/11/21 Pt takes 99 mg twice a day , Disp: , Rfl:     SIMVASTATIN PO, Take 20 mg by mouth every evening , Disp: , Rfl:     acetaminophen (TYLENOL) 325 mg tablet, Take 2 tablets (650 mg total) by mouth every 6 (six) hours as needed for mild pain (Patient not taking: Reported on 4/19/2021), Disp: 60 tablet, Rfl: 0    Coenzyme Q10 (CO Q 10 PO), Take by mouth 2 (two) times a day 5/11/21 Takes two times a day - hasn't t used in a couple months, Disp: , Rfl:     ferrous sulfate 325 (65 Fe) mg tablet, Take 325 mg by mouth daily with breakfast, Disp: , Rfl:     ibuprofen (MOTRIN) 200 mg tablet, Take 3 tablets (600 mg total) by mouth every 6 (six) hours as needed for moderate pain (Patient not taking: Reported on 4/19/2021), Disp: 60 tablet, Rfl: 0  Past Medical History:   Diagnosis Date    Disease of thyroid gland     Diverticulitis     Hypertension      Past Surgical History:   Procedure Laterality Date    ABDOMINAL ADHESION SURGERY N/A 10/12/2020    Procedure: LYSIS ADHESIONS extensive;  Surgeon: Carin Hurt MD;  [facility];  Service: General    COLON SURGERY      COLONOSCOPY      COLOSTOMY  11/02/2020    COLOSTOMY      COLOSTOMY TAKEDOWN/REVERSE MARQUEZ N/A 5/18/2021    Procedure: COLOSTOMY TAKEDOWN / [place] Marchi;  Surgeon: Carin Hurt MD;  [facility];  Service: General    [address]/ ANASTOMOSIS N/A 5/18/2021    Procedure: RESECTION COLON RIGHT OPEN, RIGHT TAP BLOCK;  Surgeon: Carin Hurt MD;  [facility];  Service: General    IR DRAINAGE TUBE CHECK AND/OR REMOVAL  11/13/2020    IR DRAINAGE TUBE PLACEMENT  10/23/2020    IR DRAINAGE TUBE PLACEMENT  11/27/2020    IR PICC PLACEMENT DOUBLE LUMEN  10/16/2020    ND LAP, SURG CLOSE ENTEROSTOMY RESECT ANAST N/A 3/17/2021    Procedure: RESECTION COLON SIGMOID LAPAROSCOPIC, EXTENSIVE LYSIS OF ADHESIONS, TAKTDON OF SPLENIC FELXURE, LOW ANTERIOR RESECTION WITH COLORECTAL ANASTOMOSIS, INTEROPERATIVE COLONOSCOPY, OPEN REPAIR OF INCISIONAL HERNIA;  Surgeon: Carin Hurt MD;  [facility];  Service: General    ND LAP,DIAGNOSTIC ABDOMEN N/A 10/12/2020    Procedure: LAPAROSCOPY DIAGNOSTIC, drainage of pelvic abcess, placement drain;  Surgeon: Odilon Degroot MD;  [facility];  Service: General    [place] LAP,DIAGNOSTIC ABDOMEN N/A 11/2/2020    Procedure: LAPAROSCOPY DIAGNOSTIC, colostomy, repair of enterotomy;  Surgeon: Odilon Degroot MD;  [facility];  Service: General    [place] LAP,DIAGNOSTIC ABDOMEN N/A 3/17/2021    Procedure: LAPAROSCOPY DIAGNOSTIC;  Surgeon: Odilon Degroot MD;  [facility];  Service: General     Family History   Problem Relation Age of Onset    Diabetes Mother     Colon cancer Father     Colon polyps Father     Heart disease Father     Diabetes Father     Diabetes Brother       reports that he quit smoking about 5 years ago  His smoking use included cigarettes  He has a 7 50 pack-year smoking history  He quit smokeless tobacco use about 5 years ago  His smokeless tobacco use included chew  He reports current alcohol use  He reports current drug use  Frequency: 7 00 times per week  Drug: Marijuana  Labs:   Lab Results   Component Value Date    WBC 12 95 (H) 05/19/2021    WBC 11 12 (H) 03/19/2021    WBC 15 82 (H) 03/18/2021    HGB 12 5 05/19/2021    HGB 10 6 (L) 03/19/2021    HGB 11 7 (L) 03/18/2021     05/19/2021     03/19/2021     03/18/2021     Lab Results   Component Value Date    ALT 24 03/18/2021    ALT 16 10/27/2020    ALT 12 10/26/2020    AST 12 03/18/2021    AST 14 10/27/2020    AST 12 10/26/2020     This SmartLink has not been configured with any valid records         PHYSICAL EXAM  General Appearance:    Alert, cooperative, no distress,    Head:    Normocephalic without obvious abnormality   Eyes:    PERRL, conjunctiva/corneas clear, EOM's intact        Neck:   Supple, no adenopathy, no JVD   Back:     Symmetric, no spinal or CVA tenderness   Lungs:     Clear to auscultation bilaterally, no wheezing or rhonchi   Heart:    Regular rate and rhythm, S1 and S2 normal, no murmur   Abdomen: Soft +BS ND mild TTP reducible recurrent incisional hernia   Extremities:   Extremities normal  No clubbing, cyanosis or edema   Psych:   Normal Affect, AOx3  Neurologic:  Skin:   CNII-XII intact  Strength symmetric, speech intact    Warm, dry, intact, no visible rashes or lesions         Physical Exam              Some portions of this record may have been generated with voice recognition software  There may be translation, syntax,  or grammatical errors  Occasional wrong word or "sound-a-like" substitutions may have occurred due to the inherent limitations of the voice recognition software  Read the chart carefully and recognize, using context, where substitutions may have occurred  If you have any questions, please contact the dictating provider for clarification or correction, as needed  This encounter has been coded by a non-certified coder

## 2022-03-19 ENCOUNTER — HOSPITAL ENCOUNTER (OUTPATIENT)
Dept: CT IMAGING | Facility: HOSPITAL | Age: 52
Discharge: HOME | End: 2022-03-19
Attending: SURGERY
Payer: COMMERCIAL

## 2022-03-19 DIAGNOSIS — K43.2 INCISIONAL HERNIA, WITHOUT OBSTRUCTION OR GANGRENE: ICD-10-CM

## 2022-03-19 PROCEDURE — G1004 CDSM NDSC: HCPCS

## 2022-03-19 PROCEDURE — 74177 CT ABD & PELVIS W/CONTRAST: CPT

## 2022-03-19 RX ADMIN — IOHEXOL 100 ML: 350 INJECTION, SOLUTION INTRAVENOUS at 08:07

## 2025-02-06 ENCOUNTER — OFFICE VISIT (OUTPATIENT)
Dept: GASTROENTEROLOGY | Facility: CLINIC | Age: 55
End: 2025-02-06
Payer: COMMERCIAL

## 2025-02-06 VITALS
WEIGHT: 175.4 LBS | SYSTOLIC BLOOD PRESSURE: 170 MMHG | HEART RATE: 96 BPM | DIASTOLIC BLOOD PRESSURE: 90 MMHG | BODY MASS INDEX: 23.79 KG/M2

## 2025-02-06 DIAGNOSIS — Z90.49 HISTORY OF COLON RESECTION: ICD-10-CM

## 2025-02-06 DIAGNOSIS — I10 HYPERTENSION, UNSPECIFIED TYPE: ICD-10-CM

## 2025-02-06 DIAGNOSIS — Z80.0 FAMILY HISTORY OF COLON CANCER: ICD-10-CM

## 2025-02-06 DIAGNOSIS — Z86.0100 HISTORY OF COLON POLYPS: ICD-10-CM

## 2025-02-06 DIAGNOSIS — R19.7 DIARRHEA, UNSPECIFIED TYPE: Primary | ICD-10-CM

## 2025-02-06 PROCEDURE — 99204 OFFICE O/P NEW MOD 45 MIN: CPT | Performed by: NURSE PRACTITIONER

## 2025-02-06 RX ORDER — COLESTIPOL HYDROCHLORIDE 1 G/1
2 TABLET ORAL 2 TIMES DAILY
Qty: 120 TABLET | Refills: 6 | Status: SHIPPED | OUTPATIENT
Start: 2025-02-06

## 2025-02-06 NOTE — PROGRESS NOTES
Name: Michael Wilkinson      : 1970      MRN: [id number]  Encounter Provider: NIR Ziegler  Encounter Date: 2025   Encounter department: [facility]  :  Assessment & Plan  Diarrhea, unspecified type  Patient states that his diarrhea is chronic and he is having 4-5 episodes per day.  He states that shortly after having his colostomy revised he was having normal bowel movements and then turned to diarrhea which continues to present day.  Does state he has tried probiotics and digestive enzymes without success.  Discussed with patient FODMAP diet and dietary discretion.  Discussed with patient decreasing alcohol intake may also help with symptoms.  Discussed with patient that stool studies will be completed and that pending results colonoscopy will be scheduled.  Discussed with patient trialing Colestid 2 g twice daily to try and solidify some bowel movement.  Discussed with patient adequate fiber and adequate fluids.  Reviewed patient's previous colonoscopy 2021.  Reviewed patient's prior surgical documentation to aid in understanding patient's current complication.  Orders:    Calprotectin,Fecal    Pancreatic elastase, fecal; Future    Stool Enteric Bacterial Panel by PCR    Fecal leukocytes; Future    Giardia antigen    Ova and parasite examination    Clostridium difficile toxin by PCR with EIA; Future    CBC and differential; Future    Comprehensive metabolic panel    Lipid panel; Future    TSH, 3rd generation; Future    Celiac Disease Panel; Future    colestipol (COLESTID) 1 g tablet; Take 2 tablets (2 g total) by mouth 2 (two) times a day    History of colon resection  Patient required colostomy due to diverticulitis 3/17/2021 due to complicated diverticulitis with perforation and abscess.  Revision was completed 2021 with right colon resection and ileocolonic anastomosis.       History of colon polyps  Colonoscopy 2021; 1 year recall after  anastomosis.  Patient states that was not completed.       Family history of colon cancer  Patient states that he does have significant family history of colon cancer with maternal grandmother  and his father diagnosed at approximately age 55.       Hypertension, unspecified type  Upon arrival patient blood pressure 170/90.  After patient was sitting for extended period of time blood pressure was retaken 160/90, heart rate 96.  Patient states he is unsure whether or not he took his blood pressure medicine last night.  Patient denied chest pain, shortness of breath, palpitations. Encouraged follow-up with cardiology for providing physician if blood pressure remains elevated.               History of Present Illness   HPI  Michael Wilkinson is a 55 y.o. male with past medical history of diverticulitis with colon resection 3/2021, hypothyroidism, hypertension presents with complaint of chronic diarrhea.  Patient states that ever since having his colostomy reversed his bowel movements were solid for a while and then turned into essentially chronic diarrhea.  He states he has been having this diarrhea since .  On exam, patient denies nausea, vomiting or abdominal pain.  Patient states he is having 4-5 loose bowel movements per day.  He denies hematochezia or melena.  Former smoker, patient states he does have a couple of drinks/beer a night.  Discussed with patient obtaining stools and lab work.  Patient states he also has well water however notes that he is the only one having diarrhea and they primarily use bottled water.  Patient's previous colonoscopy 2021 noted large polyp however poor prep.  That was while patient had ostomy site.  Was recommended to have 1 year recall after anastomosis.  Patient does not recall and a follow-up colonoscopy had not been completed.  Patient states that he has been trialing probiotics and digestive enzymes however states nothing appears to be working at this  time.  Discussed with patient that stool studies are required to come back normal prior to scheduling colonoscopies.  Also discussed with patient trialing Colestid 2 g twice daily to see if it can help solidify his bowel movements.  Discussed with patient dietary discretion and also noting that he may need to cut back on his alcohol intake that it may be contributing to his loose bowel movements.      Review of Systems       Objective   There were no vitals taken for this visit.     Physical Exam  HENT:      Head: Normocephalic.      Nose: Nose normal.   Eyes:      Extraocular Movements: Extraocular movements intact.   Cardiovascular:      Rate and Rhythm: Tachycardia present.   Pulmonary:      Breath sounds: Normal breath sounds.   Abdominal:      General: Abdomen is flat. Bowel sounds are normal. There is no distension.      Palpations: Abdomen is soft.      Tenderness: There is no abdominal tenderness.   Musculoskeletal:         General: Normal range of motion.      Cervical back: Normal range of motion.   Skin:     General: Skin is warm and dry.   Neurological:      Mental Status: He is alert and oriented to person, place, and time.   Psychiatric:         Mood and Affect: Mood normal.

## 2025-02-06 NOTE — ASSESSMENT & PLAN NOTE
Upon arrival patient blood pressure 170/90.  After patient was sitting for extended period of time blood pressure was retaken 160/90, heart rate 96.  Patient states he is unsure whether or not he took his blood pressure medicine last night.  Patient denied chest pain, shortness of breath, palpitations. Encouraged follow-up with cardiology for providing physician if blood pressure remains elevated.

## 2025-02-14 ENCOUNTER — TELEPHONE (OUTPATIENT)
Age: 55
End: 2025-02-14

## 2025-02-14 ENCOUNTER — APPOINTMENT (OUTPATIENT)
Dept: LAB | Facility: HOSPITAL | Age: 55
End: 2025-02-14
Payer: COMMERCIAL

## 2025-02-14 DIAGNOSIS — R19.7 DIARRHEA, UNSPECIFIED TYPE: ICD-10-CM

## 2025-02-14 LAB
ALBUMIN SERPL BCG-MCNC: 4.5 G/DL (ref 3.5–5)
ALP SERPL-CCNC: 61 U/L (ref 34–104)
ALT SERPL W P-5'-P-CCNC: 36 U/L (ref 7–52)
ANION GAP SERPL CALCULATED.3IONS-SCNC: 12 MMOL/L (ref 4–13)
AST SERPL W P-5'-P-CCNC: 44 U/L (ref 13–39)
BASOPHILS # BLD AUTO: 0.04 THOUSANDS/ÂΜL (ref 0–0.1)
BASOPHILS NFR BLD AUTO: 0 % (ref 0–1)
BILIRUB SERPL-MCNC: 0.93 MG/DL (ref 0.2–1)
BUN SERPL-MCNC: 17 MG/DL (ref 5–25)
CALCIUM SERPL-MCNC: 9.3 MG/DL (ref 8.4–10.2)
CHLORIDE SERPL-SCNC: 98 MMOL/L (ref 96–108)
CHOLEST SERPL-MCNC: 211 MG/DL (ref ?–200)
CO2 SERPL-SCNC: 25 MMOL/L (ref 21–32)
CREAT SERPL-MCNC: 0.82 MG/DL (ref 0.6–1.3)
EOSINOPHIL # BLD AUTO: 0.07 THOUSAND/ÂΜL (ref 0–0.61)
EOSINOPHIL NFR BLD AUTO: 1 % (ref 0–6)
ERYTHROCYTE [DISTWIDTH] IN BLOOD BY AUTOMATED COUNT: 12.2 % (ref 11.6–15.1)
GFR SERPL CREATININE-BSD FRML MDRD: 98 ML/MIN/1.73SQ M
GLUCOSE P FAST SERPL-MCNC: 146 MG/DL (ref 65–99)
HCT VFR BLD AUTO: 43.2 % (ref 36.5–49.3)
HDLC SERPL-MCNC: 122 MG/DL
HGB BLD-MCNC: 14.7 G/DL (ref 12–17)
IGA SERPL-MCNC: 290 MG/DL (ref 66–433)
IMM GRANULOCYTES # BLD AUTO: 0.03 THOUSAND/UL (ref 0–0.2)
IMM GRANULOCYTES NFR BLD AUTO: 0 % (ref 0–2)
LDLC SERPL CALC-MCNC: 73 MG/DL (ref 0–100)
LYMPHOCYTES # BLD AUTO: 1.48 THOUSANDS/ÂΜL (ref 0.6–4.47)
LYMPHOCYTES NFR BLD AUTO: 14 % (ref 14–44)
MCH RBC QN AUTO: 34.1 PG (ref 26.8–34.3)
MCHC RBC AUTO-ENTMCNC: 34 G/DL (ref 31.4–37.4)
MCV RBC AUTO: 100 FL (ref 82–98)
MONOCYTES # BLD AUTO: 1.24 THOUSAND/ÂΜL (ref 0.17–1.22)
MONOCYTES NFR BLD AUTO: 11 % (ref 4–12)
NEUTROPHILS # BLD AUTO: 8.03 THOUSANDS/ÂΜL (ref 1.85–7.62)
NEUTS SEG NFR BLD AUTO: 74 % (ref 43–75)
NONHDLC SERPL-MCNC: 89 MG/DL
NRBC BLD AUTO-RTO: 0 /100 WBCS
PLATELET # BLD AUTO: 216 THOUSANDS/UL (ref 149–390)
PMV BLD AUTO: 8.9 FL (ref 8.9–12.7)
POTASSIUM SERPL-SCNC: 3.5 MMOL/L (ref 3.5–5.3)
PROT SERPL-MCNC: 7.5 G/DL (ref 6.4–8.4)
RBC # BLD AUTO: 4.31 MILLION/UL (ref 3.88–5.62)
SODIUM SERPL-SCNC: 135 MMOL/L (ref 135–147)
TRIGL SERPL-MCNC: 80 MG/DL (ref ?–150)
TSH SERPL DL<=0.05 MIU/L-ACNC: 1.12 UIU/ML (ref 0.45–4.5)
WBC # BLD AUTO: 10.89 THOUSAND/UL (ref 4.31–10.16)

## 2025-02-14 PROCEDURE — 82784 ASSAY IGA/IGD/IGG/IGM EACH: CPT

## 2025-02-14 PROCEDURE — 84443 ASSAY THYROID STIM HORMONE: CPT

## 2025-02-14 PROCEDURE — 36415 COLL VENOUS BLD VENIPUNCTURE: CPT

## 2025-02-14 PROCEDURE — 85025 COMPLETE CBC W/AUTO DIFF WBC: CPT

## 2025-02-14 PROCEDURE — 80061 LIPID PANEL: CPT

## 2025-02-14 PROCEDURE — 86364 TISS TRNSGLTMNASE EA IG CLAS: CPT

## 2025-02-14 PROCEDURE — 80053 COMPREHEN METABOLIC PANEL: CPT | Performed by: NURSE PRACTITIONER

## 2025-02-14 NOTE — TELEPHONE ENCOUNTER
Patients GI provider:  Dr. Benton    Number to return call: [phone]    Reason for call: Pt called asking for a call regarding his BW which he has in his My chart. He is asking for the Dr to call. Please have Dr Benton reach out to pt once BW is reviewed    Scheduled procedure/appointment date if applicable: Apt/procedure N/A

## 2025-02-16 ENCOUNTER — APPOINTMENT (INPATIENT)
Dept: RADIOLOGY | Facility: HOSPITAL | Age: 55
DRG: 183 | End: 2025-02-16
Payer: COMMERCIAL

## 2025-02-16 ENCOUNTER — APPOINTMENT (EMERGENCY)
Dept: CT IMAGING | Facility: HOSPITAL | Age: 55
End: 2025-02-16
Payer: COMMERCIAL

## 2025-02-16 ENCOUNTER — HOSPITAL ENCOUNTER (INPATIENT)
Facility: HOSPITAL | Age: 55
LOS: 6 days | Discharge: HOME | DRG: 183 | End: 2025-02-22
Attending: SURGERY | Admitting: SURGERY
Payer: COMMERCIAL

## 2025-02-16 ENCOUNTER — HOSPITAL ENCOUNTER (EMERGENCY)
Facility: HOSPITAL | Age: 55
End: 2025-02-16
Attending: EMERGENCY MEDICINE | Admitting: EMERGENCY MEDICINE
Payer: COMMERCIAL

## 2025-02-16 VITALS
RESPIRATION RATE: 18 BRPM | BODY MASS INDEX: 23.7 KG/M2 | SYSTOLIC BLOOD PRESSURE: 165 MMHG | HEIGHT: 72 IN | OXYGEN SATURATION: 95 % | TEMPERATURE: 98 F | DIASTOLIC BLOOD PRESSURE: 98 MMHG | WEIGHT: 175 LBS | HEART RATE: 122 BPM

## 2025-02-16 DIAGNOSIS — S22.41XA: ICD-10-CM

## 2025-02-16 DIAGNOSIS — E78.5 HYPERLIPIDEMIA, UNSPECIFIED HYPERLIPIDEMIA TYPE: ICD-10-CM

## 2025-02-16 DIAGNOSIS — R00.0 SINUS TACHYCARDIA: ICD-10-CM

## 2025-02-16 DIAGNOSIS — S27.0XXA TRAUMATIC PNEUMOTHORAX, INITIAL ENCOUNTER: ICD-10-CM

## 2025-02-16 DIAGNOSIS — I10 HYPERTENSION, UNSPECIFIED TYPE: ICD-10-CM

## 2025-02-16 DIAGNOSIS — F10.10 ALCOHOL ABUSE: ICD-10-CM
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
## 2025-02-16 DIAGNOSIS — J94.8 HYDROPNEUMOTHORAX: ICD-10-CM

## 2025-02-16 DIAGNOSIS — E03.8 OTHER SPECIFIED HYPOTHYROIDISM: ICD-10-CM

## 2025-02-16 DIAGNOSIS — S22.41XA CLOSED FRACTURE OF MULTIPLE RIBS OF RIGHT SIDE, INITIAL ENCOUNTER: Primary | ICD-10-CM

## 2025-02-16 LAB
ALBUMIN SERPL BCG-MCNC: 4 G/DL (ref 3.5–5)
ALP SERPL-CCNC: 54 U/L (ref 34–104)
ALT SERPL W P-5'-P-CCNC: 37 U/L (ref 7–52)
ANION GAP SERPL CALCULATED.3IONS-SCNC: 9 MMOL/L (ref 4–13)
APTT PPP: 28 SECONDS (ref 23–34)
AST SERPL W P-5'-P-CCNC: 43 U/L (ref 13–39)
BASOPHILS # BLD AUTO: 0.02 THOUSANDS/ΜL (ref 0–0.1)
BASOPHILS NFR BLD AUTO: 0 % (ref 0–1)
BILIRUB SERPL-MCNC: 0.85 MG/DL (ref 0.2–1)
BUN SERPL-MCNC: 13 MG/DL (ref 5–25)
CALCIUM SERPL-MCNC: 8.4 MG/DL (ref 8.4–10.2)
CHLORIDE SERPL-SCNC: 102 MMOL/L (ref 96–108)
CO2 SERPL-SCNC: 24 MMOL/L (ref 21–32)
CREAT SERPL-MCNC: 0.69 MG/DL (ref 0.6–1.3)
EOSINOPHIL # BLD AUTO: 0.02 THOUSAND/ΜL (ref 0–0.61)
EOSINOPHIL NFR BLD AUTO: 0 % (ref 0–6)
ERYTHROCYTE [DISTWIDTH] IN BLOOD BY AUTOMATED COUNT: 12.2 % (ref 11.6–15.1)
GFR SERPL CREATININE-BSD FRML MDRD: 106 ML/MIN/1.73SQ M
GLUCOSE SERPL-MCNC: 126 MG/DL (ref 65–140)
HCT VFR BLD AUTO: 42.3 % (ref 36.5–49.3)
HGB BLD-MCNC: 14.2 G/DL (ref 12–17)
IMM GRANULOCYTES # BLD AUTO: 0.07 THOUSAND/UL (ref 0–0.2)
IMM GRANULOCYTES NFR BLD AUTO: 1 % (ref 0–2)
INR PPP: 1.05 (ref 0.85–1.19)
LYMPHOCYTES # BLD AUTO: 0.8 THOUSANDS/ΜL (ref 0.6–4.47)
LYMPHOCYTES NFR BLD AUTO: 8 % (ref 14–44)
MCH RBC QN AUTO: 33.9 PG (ref 26.8–34.3)
MCHC RBC AUTO-ENTMCNC: 33.6 G/DL (ref 31.4–37.4)
MCV RBC AUTO: 101 FL (ref 82–98)
MONOCYTES # BLD AUTO: 1.11 THOUSAND/ΜL (ref 0.17–1.22)
MONOCYTES NFR BLD AUTO: 11 % (ref 4–12)
NEUTROPHILS # BLD AUTO: 8.05 THOUSANDS/ΜL (ref 1.85–7.62)
NEUTS SEG NFR BLD AUTO: 80 % (ref 43–75)
NRBC BLD AUTO-RTO: 0 /100 WBCS
PLATELET # BLD AUTO: 187 THOUSANDS/UL (ref 149–390)
PMV BLD AUTO: 8.7 FL (ref 8.9–12.7)
POTASSIUM SERPL-SCNC: 3.8 MMOL/L (ref 3.5–5.3)
PROT SERPL-MCNC: 7 G/DL (ref 6.4–8.4)
PROTHROMBIN TIME: 14.2 SECONDS (ref 12.3–15)
RBC # BLD AUTO: 4.19 MILLION/UL (ref 3.88–5.62)
SODIUM SERPL-SCNC: 135 MMOL/L (ref 135–147)
WBC # BLD AUTO: 10.07 THOUSAND/UL (ref 4.31–10.16)

## 2025-02-16 PROCEDURE — 99284 EMERGENCY DEPT VISIT MOD MDM: CPT

## 2025-02-16 PROCEDURE — 85610 PROTHROMBIN TIME: CPT | Performed by: PHYSICIAN ASSISTANT

## 2025-02-16 PROCEDURE — 93005 ELECTROCARDIOGRAM TRACING: CPT

## 2025-02-16 PROCEDURE — 36415 COLL VENOUS BLD VENIPUNCTURE: CPT | Performed by: PHYSICIAN ASSISTANT

## 2025-02-16 PROCEDURE — 99285 EMERGENCY DEPT VISIT HI MDM: CPT

## 2025-02-16 PROCEDURE — 96375 TX/PRO/DX INJ NEW DRUG ADDON: CPT

## 2025-02-16 PROCEDURE — 71045 X-RAY EXAM CHEST 1 VIEW: CPT

## 2025-02-16 PROCEDURE — 80053 COMPREHEN METABOLIC PANEL: CPT | Performed by: PHYSICIAN ASSISTANT

## 2025-02-16 PROCEDURE — 71250 CT THORAX DX C-: CPT

## 2025-02-16 PROCEDURE — 94664 DEMO&/EVAL PT USE INHALER: CPT

## 2025-02-16 PROCEDURE — 99223 1ST HOSP IP/OBS HIGH 75: CPT | Performed by: SURGERY

## 2025-02-16 PROCEDURE — 96374 THER/PROPH/DIAG INJ IV PUSH: CPT

## 2025-02-16 PROCEDURE — 85730 THROMBOPLASTIN TIME PARTIAL: CPT | Performed by: PHYSICIAN ASSISTANT

## 2025-02-16 PROCEDURE — 99285 EMERGENCY DEPT VISIT HI MDM: CPT | Performed by: PHYSICIAN ASSISTANT

## 2025-02-16 PROCEDURE — 85025 COMPLETE CBC W/AUTO DIFF WBC: CPT | Performed by: PHYSICIAN ASSISTANT

## 2025-02-16 RX ORDER — HYDROCODONE BITARTRATE AND ACETAMINOPHEN 5; 325 MG/1; MG/1
1 TABLET ORAL ONCE
Refills: 0 | Status: COMPLETED | OUTPATIENT
Start: 2025-02-16 | End: 2025-02-16

## 2025-02-16 RX ORDER — LOSARTAN POTASSIUM 50 MG/1
100 TABLET ORAL DAILY
Status: DISCONTINUED | OUTPATIENT
Start: 2025-02-16 | End: 2025-02-22 | Stop reason: HOSPADM

## 2025-02-16 RX ORDER — HYDROMORPHONE HCL/PF 1 MG/ML
0.5 SYRINGE (ML) INJECTION
Status: DISCONTINUED | OUTPATIENT
Start: 2025-02-16 | End: 2025-02-22 | Stop reason: HOSPADM

## 2025-02-16 RX ORDER — LIDOCAINE 50 MG/G
1 PATCH TOPICAL ONCE
Status: DISCONTINUED | OUTPATIENT
Start: 2025-02-16 | End: 2025-02-16 | Stop reason: HOSPADM

## 2025-02-16 RX ORDER — HEPARIN SODIUM 5000 [USP'U]/ML
5000 INJECTION, SOLUTION INTRAVENOUS; SUBCUTANEOUS EVERY 8 HOURS SCHEDULED
Status: DISCONTINUED | OUTPATIENT
Start: 2025-02-16 | End: 2025-02-22 | Stop reason: HOSPADM

## 2025-02-16 RX ORDER — AMLODIPINE BESYLATE 10 MG/1
10 TABLET ORAL DAILY
Status: DISCONTINUED | OUTPATIENT
Start: 2025-02-16 | End: 2025-02-22 | Stop reason: HOSPADM

## 2025-02-16 RX ORDER — AMOXICILLIN 250 MG
1 CAPSULE ORAL
Status: DISCONTINUED | OUTPATIENT
Start: 2025-02-16 | End: 2025-02-22 | Stop reason: HOSPADM

## 2025-02-16 RX ORDER — OXYCODONE HYDROCHLORIDE 5 MG/1
5 TABLET ORAL EVERY 4 HOURS PRN
Refills: 0 | Status: DISCONTINUED | OUTPATIENT
Start: 2025-02-16 | End: 2025-02-17

## 2025-02-16 RX ORDER — METHOCARBAMOL 500 MG/1
500 TABLET, FILM COATED ORAL EVERY 6 HOURS PRN
Status: DISCONTINUED | OUTPATIENT
Start: 2025-02-16 | End: 2025-02-17

## 2025-02-16 RX ORDER — MORPHINE SULFATE 4 MG/ML
4 INJECTION, SOLUTION INTRAMUSCULAR; INTRAVENOUS ONCE
Status: COMPLETED | OUTPATIENT
Start: 2025-02-16 | End: 2025-02-16

## 2025-02-16 RX ORDER — HYDROCHLOROTHIAZIDE 25 MG/1
25 TABLET ORAL DAILY
Status: DISCONTINUED | OUTPATIENT
Start: 2025-02-16 | End: 2025-02-22 | Stop reason: HOSPADM

## 2025-02-16 RX ORDER — LEVOTHYROXINE SODIUM 75 UG/1
75 TABLET ORAL DAILY
Status: DISCONTINUED | OUTPATIENT
Start: 2025-02-16 | End: 2025-02-22 | Stop reason: HOSPADM

## 2025-02-16 RX ORDER — LORAZEPAM 2 MG/ML
1 INJECTION INTRAMUSCULAR ONCE
Status: COMPLETED | OUTPATIENT
Start: 2025-02-16 | End: 2025-02-16

## 2025-02-16 RX ORDER — LORAZEPAM 2 MG/ML
INJECTION INTRAMUSCULAR
Status: COMPLETED
Start: 2025-02-16 | End: 2025-02-16

## 2025-02-16 RX ORDER — ENOXAPARIN SODIUM 100 MG/ML
30 INJECTION SUBCUTANEOUS EVERY 12 HOURS
Status: DISCONTINUED | OUTPATIENT
Start: 2025-02-16 | End: 2025-02-16

## 2025-02-16 RX ORDER — MORPHINE SULFATE 4 MG/ML
INJECTION, SOLUTION INTRAMUSCULAR; INTRAVENOUS
Status: COMPLETED
Start: 2025-02-16 | End: 2025-02-16

## 2025-02-16 RX ORDER — ATORVASTATIN CALCIUM 10 MG/1
10 TABLET, FILM COATED ORAL
Status: DISCONTINUED | OUTPATIENT
Start: 2025-02-16 | End: 2025-02-22 | Stop reason: HOSPADM

## 2025-02-16 RX ORDER — ACETAMINOPHEN 325 MG/1
975 TABLET ORAL EVERY 8 HOURS SCHEDULED
Status: DISCONTINUED | OUTPATIENT
Start: 2025-02-16 | End: 2025-02-22 | Stop reason: HOSPADM

## 2025-02-16 RX ADMIN — OXYCODONE HYDROCHLORIDE 5 MG: 5 TABLET ORAL at 14:49

## 2025-02-16 RX ADMIN — LORAZEPAM 1 MG: 2 INJECTION INTRAMUSCULAR; INTRAVENOUS at 11:20

## 2025-02-16 RX ADMIN — Medication 2.5 MG: at 21:14

## 2025-02-16 RX ADMIN — LOSARTAN POTASSIUM 100 MG: 50 TABLET, FILM COATED ORAL at 14:50

## 2025-02-16 RX ADMIN — HYDROCODONE BITARTRATE AND ACETAMINOPHEN 1 TABLET: 5; 325 TABLET ORAL at 09:21

## 2025-02-16 RX ADMIN — OXYCODONE HYDROCHLORIDE 5 MG: 5 TABLET ORAL at 18:57

## 2025-02-16 RX ADMIN — AMLODIPINE BESYLATE 10 MG: 10 TABLET ORAL at 14:50

## 2025-02-16 RX ADMIN — METHOCARBAMOL 500 MG: 500 TABLET ORAL at 14:49

## 2025-02-16 RX ADMIN — HEPARIN SODIUM 5000 UNITS: 5000 INJECTION INTRAVENOUS; SUBCUTANEOUS at 21:14

## 2025-02-16 RX ADMIN — HYDROMORPHONE HYDROCHLORIDE 0.5 MG: 1 INJECTION, SOLUTION INTRAMUSCULAR; INTRAVENOUS; SUBCUTANEOUS at 17:57

## 2025-02-16 RX ADMIN — ACETAMINOPHEN 975 MG: 325 TABLET, FILM COATED ORAL at 14:51

## 2025-02-16 RX ADMIN — ATORVASTATIN CALCIUM 10 MG: 10 TABLET, FILM COATED ORAL at 17:48

## 2025-02-16 RX ADMIN — LORAZEPAM 1 MG: 2 INJECTION INTRAMUSCULAR at 11:20

## 2025-02-16 RX ADMIN — MORPHINE SULFATE 4 MG: 4 INJECTION, SOLUTION INTRAMUSCULAR; INTRAVENOUS at 11:27

## 2025-02-16 RX ADMIN — METHOCARBAMOL 500 MG: 500 TABLET ORAL at 21:15

## 2025-02-16 RX ADMIN — MORPHINE SULFATE 4 MG: 4 INJECTION INTRAVENOUS at 11:27

## 2025-02-16 RX ADMIN — LEVOTHYROXINE SODIUM 75 MCG: 0.07 TABLET ORAL at 17:45

## 2025-02-16 RX ADMIN — LIDOCAINE 5% 1 PATCH: 700 PATCH TOPICAL at 09:22

## 2025-02-16 RX ADMIN — HEPARIN SODIUM 5000 UNITS: 5000 INJECTION INTRAVENOUS; SUBCUTANEOUS at 14:51

## 2025-02-16 RX ADMIN — ACETAMINOPHEN 975 MG: 325 TABLET, FILM COATED ORAL at 21:14

## 2025-02-16 RX ADMIN — HYDROCHLOROTHIAZIDE 25 MG: 25 TABLET ORAL at 17:47

## 2025-02-16 NOTE — RESPIRATORY THERAPY NOTE
Resp Care   02/16/25 1310   Respiratory Protocol   Protocol Initiated? Yes   Protocol Selection Airway Clearance   Language Barrier? No   Medical & Social History Reviewed? Yes   Diagnostic Studies Reviewed? Yes   Physical Assessment Performed? Yes   Airway Clearance Plan Incentive Spirometer   Respiratory Assessment   Assessment Type Assess only   General Appearance Awake;Alert   Respiratory Pattern Normal   Chest Assessment Chest expansion symmetrical   Bilateral Breath Sounds Clear   Resp Comments pt admitted for trauma, rib fracture, pt has no pulm hx nor takes any home resp meds, pt found on 2l nc, spo2 is 98%, bs are clear, Is therapy instructed and performed, will cont to monitor per acp.

## 2025-02-16 NOTE — ED PROVIDER NOTES
Time reflects when diagnosis was documented in both MDM as applicable and the Disposition within this note       Time User Action Codes Description Comment    2/16/2025 10:54 AM Bryanna Varela [W19.XXXA] Fall     2/16/2025 10:56 AM Bryanna Varela [S22.41XA] Fracture of ribs, five, closed, right, initial encounter     2/16/2025 10:56 AM Bryanna Varela [J94.8] Hydropneumothorax     2/16/2025 10:56 AM Bryanna Varela [R00.0] Sinus tachycardia           ED Disposition       ED Disposition   Transfer to Another Facility-In Network    Condition   --    Date/Time   Sun Feb 16, 2025 10:54 AM    Comment   Michael Wilkinson should be transferred out to [facility].               Assessment & Plan       Medical Decision Making  Patient with right rib pain after fall, will order CT scan to r/o rib fracture or pneumothorax or pulmonary contusion.  Patient has bruising right elbow, no tenderness and he has FROM, no need for xray.  No other injuries, no anticoagulants.  Patient with 5th-10th rib fracture on CT scan with small pneumo, given number of rib fractures, d/w trauma, will transfer to [facility].  Patient placed on 2L NC.    Patient with chronic tachycardia upon review on prior records.    Patient with worsening tremor, states he drinks daily, will give ativan.     Amount and/or Complexity of Data Reviewed  Labs: ordered.  Radiology: ordered.  ECG/medicine tests: ordered and independent interpretation performed.  Discussion of management or test interpretation with external provider(s): D/w trauma    Risk  Prescription drug management.        ED Course as of 02/16/25 1141   Sun Feb 16, 2025   1049 Patient accepted at [facility]   1102 Patient states he drinks daily.  He has tremors.  Will give ativan.        Medications   lidocaine (LIDODERM) 5 % patch 1 patch (1 patch Topical Medication Applied 2/16/25 1822)   LORazepam (ATIVAN) injection 1 mg (has no administration in time range)   HYDROcodone-acetaminophen  (NORCO) 5-325 mg per tablet 1 tablet (1 tablet Oral Given 2/16/25 0921)   morphine injection 4 mg (4 mg Intravenous Given 2/16/25 1127)       ED Risk Strat Scores                 CIWA-Ar Score       Row Name 02/16/25 1103             CIWA-Ar    Blood Pressure 165/98      Pulse 121      Nausea and Vomiting 0      Tactile Disturbances 0      Tremor 2      Auditory Disturbances 0      Paroxysmal Sweats 0      Visual Disturbances 0      Anxiety 2      Headache, Fullness in Head 0      Agitation 0      Orientation and Clouding of Sensorium 0      CIWA-Ar Total 4                              SBIRT 22yo+      Flowsheet Row Most Recent Value   Initial Alcohol Screen: US AUDIT-C     1. How often do you have a drink containing alcohol? 6 Filed at: 02/16/2025 1106   2. How many drinks containing alcohol do you have on a typical day you are drinking?  3 Filed at: 02/16/2025 1106   3a. Male UNDER 65: How often do you have five or more drinks on one occasion? 2 Filed at: 02/16/2025 1106   3b. FEMALE Any Age, or MALE 65+: How often do you have 4 or more drinks on one occassion? 0 Filed at: 02/16/2025 1106   Audit-C Score 11 Filed at: 02/16/2025 1106   JORDY: How many times in the past year have you...    Used an illegal drug or used a prescription medication for non-medical reasons? Never Filed at: 02/16/2025 1106                            History of Present Illness       Chief Complaint   Patient presents with    Fall     Pt slipped on the ice outside and fell backwards into stone wall. Pt reports right upper back pain. -LOC -head strike -thinners       Past Medical History:   Diagnosis Date    Disease of thyroid gland     Diverticulitis     Hypertension       Past Surgical History:   Procedure Laterality Date    ABDOMINAL ADHESION SURGERY N/A 10/12/2020    Procedure: LYSIS ADHESIONS extensive;  Surgeon: Silverio Barros MD;  Location:  MAIN OR;  Service: General    COLON SURGERY      COLONOSCOPY  02/22/2021    COLOSTOMY   11/02/2020    COLOSTOMY      COLOSTOMY TAKEDOWN/REVERSE MARQUEZ N/A 05/18/2021    Procedure: COLOSTOMY TAKEDOWN / REVERSE MARQUEZ;  Surgeon: Silverio Barros MD;  Location: [facility] MAIN OR;  Service: General    HEMICOLOECTOMY W/ ANASTOMOSIS N/A 05/18/2021    Procedure: RESECTION COLON RIGHT OPEN, RIGHT TAP BLOCK;  Surgeon: Silverio Barros MD;  Location: [facility] MAIN OR;  Service: General    IR DRAINAGE TUBE CHECK AND/OR REMOVAL  11/13/2020    IR DRAINAGE TUBE PLACEMENT  10/23/2020    IR DRAINAGE TUBE PLACEMENT  11/27/2020    IR PICC PLACEMENT DOUBLE LUMEN  10/16/2020    NE LAPS ABD PRTM&OMENTUM DX W/WO SPEC BR/WA SPX N/A 10/12/2020    Procedure: LAPAROSCOPY DIAGNOSTIC, drainage of pelvic abcess, placement drain;  Surgeon: Silverio Barros MD;  Location: [facility] OR;  Service: General    NE LAPS ABD PRTM&OMENTUM DX W/WO SPEC BR/WA SPX N/A 11/02/2020    Procedure: LAPAROSCOPY DIAGNOSTIC, colostomy, repair of enterotomy;  Surgeon: Silverio Barros MD;  Location: [facility] OR;  Service: General    NE LAPS ABD PRTM&OMENTUM DX W/WO SPEC BR/WA SPX N/A 03/17/2021    Procedure: LAPAROSCOPY DIAGNOSTIC;  Surgeon: Silverio Barros MD;  Location: [facility];  Service: General    NE LAPS CLSR NTRSTM LG/SM INT W/RESCJ & ANASTOMOSIS N/A 03/17/2021    Procedure: RESECTION COLON SIGMOID LAPAROSCOPIC, EXTENSIVE LYSIS OF ADHESIONS, TAKTDON OF SPLENIC FELXURE, LOW ANTERIOR RESECTION WITH COLORECTAL ANASTOMOSIS, INTEROPERATIVE COLONOSCOPY, OPEN REPAIR OF INCISIONAL HERNIA;  Surgeon: Silverio Barros MD;  Location: [facility];  Service: General      Family History   Problem Relation Age of Onset    Diabetes Mother     Colon cancer Father     Colon polyps Father     Heart disease Father     Diabetes Father     Diabetes Brother       Social History     Tobacco Use    Smoking status: Former     Current packs/day: 0.00     Average packs/day: 0.5 packs/day for 15.0 years (7.5 ttl pk-yrs)     Types: Cigarettes     Start date: 2002     Quit date: 2017      Years since quittin.1    Smokeless tobacco: Former     Types: Chew     Quit date:     Tobacco comments:     quit 6 years ago-previous hx of cigarette use- smoked for 15 years , averaged 1/2 pk/day. No current smoking or chew use at this time   Vaping Use    Vaping status: Never Used   Substance Use Topics    Alcohol use: Yes     Comment: social    Drug use: Yes     Frequency: 7.0 times per week     Types: Marijuana     Comment: recreational - instructed to  cutback prior to surgery      E-Cigarette/Vaping    E-Cigarette Use Never User     Comments Denies       E-Cigarette/Vaping Substances      I have reviewed and agree with the history as documented.     Patient is a 54 y/o M with h/o HTN that presents to the ED with right rib pain after a fall yesterday.  He states he slipped on ice and fell backward into a wall.  He has right posterior rib pain.  No other injuries.  He has bruising to right elbow, but no pain.  He denies hitting head or LOC.  He has pain with deep breaths.  He took aleve this morning. NO SOB.  Airway patent, breathing normal, pulses 2+B/L, patient fully undressed for exam.  No open wounds.        History provided by:  Patient  Fall  Associated symptoms: no abdominal pain, no nausea and no vomiting        Review of Systems   Constitutional:  Negative for chills and fever.   HENT: Negative.     Respiratory:  Negative for cough and shortness of breath.    Cardiovascular:         Right rib pain   Gastrointestinal:  Negative for abdominal pain, nausea and vomiting.   Skin:  Negative for color change and wound.   Neurological:  Negative for dizziness, weakness, light-headedness and numbness.   Psychiatric/Behavioral:  Negative for confusion.    All other systems reviewed and are negative.          Objective       ED Triage Vitals [25 0845]   Temperature Pulse Blood Pressure Respirations SpO2 Patient Position - Orthostatic VS   98 °F (36.7 °C) (!) 120 135/81 18 97 % Sitting      Temp  Source Heart Rate Source BP Location FiO2 (%) Pain Score    Temporal Monitor Left arm -- 8      Vitals      Date and Time Temp Pulse SpO2 Resp BP Pain Score FACES Pain Rating User   02/16/25 1127 -- -- -- -- -- 10 - Worst Possible Pain -- EW   02/16/25 1103 -- 121 -- -- 165/98 -- -- EW   02/16/25 1100 -- 120 97 % 22 165/86 -- -- EW   02/16/25 1030 -- 118 96 % 16 152/73 -- -- EW   02/16/25 1003 -- 111 -- -- -- -- -- CMD   02/16/25 1000 -- 112 96 % 16 143/82 -- -- EW   02/16/25 0930 -- 116 97 % 16 152/80 -- -- EW   02/16/25 0921 -- -- -- -- -- 10 - Worst Possible Pain -- EW   02/16/25 0855 -- 111 -- -- -- -- -- EW   02/16/25 0845 98 °F (36.7 °C) 120 97 % 18 135/81 8 -- CM            Physical Exam  Vitals and nursing note reviewed.   Constitutional:       General: He is not in acute distress.     Appearance: Normal appearance. He is well-developed and well-groomed. He is not ill-appearing or diaphoretic.   HENT:      Head: Normocephalic and atraumatic.      Right Ear: Hearing normal.      Left Ear: Hearing normal.      Nose: Nose normal.      Mouth/Throat:      Mouth: Mucous membranes are moist.   Eyes:      Conjunctiva/sclera: Conjunctivae normal.   Cardiovascular:      Rate and Rhythm: Regular rhythm. Tachycardia present.      Heart sounds: Normal heart sounds.   Pulmonary:      Effort: Pulmonary effort is normal.      Breath sounds: Normal breath sounds. No wheezing, rhonchi or rales.   Chest:      Chest wall: Tenderness (right posterior and later ribs) present. No deformity or crepitus.   Abdominal:      General: Abdomen is flat.      Palpations: Abdomen is soft.      Tenderness: There is no abdominal tenderness.   Musculoskeletal:      Cervical back: Normal range of motion and neck supple. No spinous process tenderness or muscular tenderness.      Comments: B/L UE and LE FROM, nontender to palpation.    Patient has ecchymosis to right elbow, but has FROM.    Skin:     General: Skin is warm and dry.       Coloration: Skin is not pale.      Findings: Bruising (right elbow) present.   Neurological:      Mental Status: He is alert and oriented to person, place, and time.      GCS: GCS eye subscore is 4. GCS verbal subscore is 5. GCS motor subscore is 6.      Sensory: Sensation is intact.      Motor: Tremor present.   Psychiatric:         Mood and Affect: Mood normal.         Behavior: Behavior is cooperative.         Results Reviewed       Procedure Component Value Units Date/Time    Comprehensive metabolic panel [[id number]]  (Abnormal) Collected: 02/16/25 1043    Lab Status: Final result Specimen: Blood from Arm, Right Updated: 02/16/25 1113     Sodium 135 mmol/L      Potassium 3.8 mmol/L      Chloride 102 mmol/L      CO2 24 mmol/L      ANION GAP 9 mmol/L      BUN 13 mg/dL      Creatinine 0.69 mg/dL      Glucose 126 mg/dL      Calcium 8.4 mg/dL      AST 43 U/L      ALT 37 U/L      Alkaline Phosphatase 54 U/L      Total Protein 7.0 g/dL      Albumin 4.0 g/dL      Total Bilirubin 0.85 mg/dL      eGFR 106 ml/min/1.73sq m     Narrative:      National Kidney Disease Foundation guidelines for Chronic Kidney Disease (CKD):     Stage 1 with normal or high GFR (GFR > 90 mL/min/1.73 square meters)    Stage 2 Mild CKD (GFR = 60-89 mL/min/1.73 square meters)    Stage 3A Moderate CKD (GFR = 45-59 mL/min/1.73 square meters)    Stage 3B Moderate CKD (GFR = 30-44 mL/min/1.73 square meters)    Stage 4 Severe CKD (GFR = 15-29 mL/min/1.73 square meters)    Stage 5 End Stage CKD (GFR <15 mL/min/1.73 square meters)  Note: GFR calculation is accurate only with a steady state creatinine    Protime-INR [[id number]]  (Normal) Collected: 02/16/25 1043    Lab Status: Final result Specimen: Blood from Arm, Right Updated: 02/16/25 1112     Protime 14.2 seconds      INR 1.05    Narrative:      INR Therapeutic Range    Indication                                             INR Range      Atrial Fibrillation                                                2.0-3.0  Hypercoagulable State                                    2.0.2.3  Left Ventricular Asist Device                            2.0-3.0  Mechanical Heart Valve                                  -    Aortic(with afib, MI, embolism, HF, LA enlargement,    and/or coagulopathy)                                     2.0-3.0 (2.5-3.5)     Mitral                                                             2.5-3.5  Prosthetic/Bioprosthetic Heart Valve               2.0-3.0  Venous thromboembolism (VTE: VT, PE        2.0-3.0    APTT [[id number]]  (Normal) Collected: 02/16/25 1043    Lab Status: Final result Specimen: Blood from Arm, Right Updated: 02/16/25 1112     PTT 28 seconds     CBC and differential [[id number]]  (Abnormal) Collected: 02/16/25 1043    Lab Status: Final result Specimen: Blood from Arm, Right Updated: 02/16/25 1059     WBC 10.07 Thousand/uL      RBC 4.19 Million/uL      Hemoglobin 14.2 g/dL      Hematocrit 42.3 %       fL      MCH 33.9 pg      MCHC 33.6 g/dL      RDW 12.2 %      MPV 8.7 fL      Platelets 187 Thousands/uL      nRBC 0 /100 WBCs      Segmented % 80 %      Immature Grans % 1 %      Lymphocytes % 8 %      Monocytes % 11 %      Eosinophils Relative 0 %      Basophils Relative 0 %      Absolute Neutrophils 8.05 Thousands/µL      Absolute Immature Grans 0.07 Thousand/uL      Absolute Lymphocytes 0.80 Thousands/µL      Absolute Monocytes 1.11 Thousand/µL      Eosinophils Absolute 0.02 Thousand/µL      Basophils Absolute 0.02 Thousands/µL             CT chest without contrast   Final Interpretation by Johann Nolen MD (02/16 1031)      Nondisplaced fractures of the right 5th-10th ribs, with small right hydropneumothorax.         I personally discussed this study with JAMEL VARELA on 2/16/2025 10:31 AM.               Workstation performed: [id number]             ECG 12 Lead Documentation Only    Date/Time: 2/16/2025 11:03 AM    Performed by: Jamel Varela,  ROSAURA  Authorized by: Bryanna Varela PA-C    Indications / Diagnosis:  Fall, tachycardia  ECG reviewed by me, the ED Provider: yes    Patient location:  ED  Previous ECG:     Previous ECG:  Compared to current    Similarity:  No change  Rate:     ECG rate:  121  Rhythm:     Rhythm: sinus tachycardia    Conduction:     Conduction: normal    ST segments:     ST segments:  Normal  T waves:     T waves: normal        ED Medication and Procedure Management   Prior to Admission Medications   Prescriptions Last Dose Informant Patient Reported? Taking?   LEVOTHYROXINE SODIUM PO  Self Yes No   Sig: Take 75 mcg by mouth daily Takes in the am   Olmesartan-amLODIPine-HCTZ (TRIBENZOR PO)  Self Yes No   Sig: Take by mouth every evening Pt reports dose is 40-10-25mg   SIMVASTATIN PO  Self Yes No   Sig: Take 20 mg by mouth every evening    colestipol (COLESTID) 1 g tablet   No No   Sig: Take 2 tablets (2 g total) by mouth 2 (two) times a day   potassium chloride (MICRO-K) 10 MEQ CR capsule  Self Yes No   Sig: Take by mouth 2 (two) times a day 5/11/21 Pt takes 99 mg twice a day.      Facility-Administered Medications: None     Patient's Medications   Discharge Prescriptions    No medications on file     No discharge procedures on file.  ED SEPSIS DOCUMENTATION   Time reflects when diagnosis was documented in both MDM as applicable and the Disposition within this note       Time User Action Codes Description Comment    2/16/2025 10:54 AM Bryanna Varela [W19.XXXA] Fall     2/16/2025 10:56 AM Bryanna Varela [S22.41XA] Fracture of ribs, five, closed, right, initial encounter     2/16/2025 10:56 AM Bryanna Varela [J94.8] Hydropneumothorax     2/16/2025 10:56 AM Bryanna Varela [R00.0] Sinus tachycardia                  Bryanna Varela PA-C  02/16/25 1141

## 2025-02-16 NOTE — H&P
H&P - Trauma   Name: Michael Wilkinson 55 y.o. male I MRN: [id number]  Unit/Bed#: ED 18 I Date of Admission: 2/16/2025   Date of Service: 2/16/2025 I Hospital Day: 0     Assessment & Plan  Closed fracture of multiple ribs of right side, initial encounter  - Multiple right-sided rib fractures (5-10), present on admission.  - Continue rib fracture protocol.  - Continue to encourage incentive spirometer use and adequate pulmonary hygiene.    - Appreciate APS evaluation and recommendations.  - Continue multimodal analgesic regimen.  - Supplemental oxygen via nasal cannula as needed to maintain saturations greater than or equal to 94%.  - Repeat chest x-ray's at 6 pm today and 6 am tomorrow   - PT and OT evaluation and treatment as indicated.  - Outpatient follow-up in the trauma clinic for re-evaluation in approximately 2 weeks.   Traumatic pneumothorax, initial encounter  Present on 2/16 CT chest in setting of multiple rib fractures  Re-evaluate with chest x-ray's at 6 pm today and 6 am tomorrow   Other specified hypothyroidism  Continue home levothyroxine  Hypertension, unspecified type  Continue home losartan, amlodipine, and HCTZ  Hyperlipidemia, unspecified hyperlipidemia type  Continue home atorvastatin  Alcohol abuse  Patient endorses daily alcohol consumption, approx. 4 beers daily   Last drink evening of 2/15  Was anxious, tachycardic, and tremulous at [facility] and given ativan  Asymptomatic upon arrival to [facility], though persistently tachycardic  CIWA protocol ordered  Sinus tachycardia  HR in 120's upon arrival  Per chart review, patient has h/o of sinus tachycardia  EKG 2/16- NSR with rate of 127  Continue to monitor heart rate while inpatient    Bowel Regimen- Senna-s  DVT PPX: H    Trauma Alert: Other transfer     Model of Arrival: Transfer [facility]     Trauma Team: Attending Dr. Chaparro, Residents Dr. Talamantes, and AP RICARDO RestrepoC  Consultants:     Other: {routine consult; Epic consult  order placed; APS    History of Present Illness   Chief Complaint: right chest wall pain   Mechanism:Fall     Michael Wilkinson is a 55 y.o. male who presents as trauma transfer from [facility] ED. Patient originally presented to ED for evaluation of right sided chest wall and back pain after mechanical fall. Patient states he slipped on ice today and hit his right side on a stone wall. Denies head strike or LOC. Denies AP or AC. On CT, was found to have multiple right-sided rib fractures and small pneumothorax and was transferred to [facility] for trauma evaluation.     Review of Systems   All other systems reviewed and are negative.    Medical History Review: I have reviewed the patient's PMH, PSH, Social History, Family History, Meds, and Allergies   Immunization History   Administered Date(s) Administered    COVID-19 PFIZER VACCINE 0.3 ML IM 04/07/2021, 04/30/2021, 11/22/2021    COVID-19 Pfizer Vac BIVALENT Hilario-sucrose 12 Yr+ IM 09/26/2022, 03/25/2023    COVID-19 Pfizer mRNA vacc PF hilario-sucrose 12 yr and older (Comirnaty) 09/23/2023, 08/28/2024    COVID-19 Pfizer vac (Hilario-sucrose, gray cap) 12 yr+ IM 04/08/2022    Influenza, recombinant, quadrivalent,injectable, preservative free 10/14/2020     Last Tetanus: unable to obtain via chart review        Objective :  Temp:  [97.8 °F (36.6 °C)-98 °F (36.7 °C)] 97.8 °F (36.6 °C)  HR:  [111-124] 119  BP: (130-165)/(73-98) 136/87  Resp:  [15-22] 15  SpO2:  [95 %-97 %] 96 %  O2 Device: Nasal cannula  Nasal Cannula O2 Flow Rate (L/min):  [2 L/min] 2 L/min    Initial Vitals:   Temperature: 97.8 °F (36.6 °C) (02/16/25 1230)  Pulse: (!) 119 (02/16/25 1230)  Respirations: 15 (02/16/25 1230)  Blood Pressure: 136/87 (02/16/25 1230)    Primary Survey:   Airway:        Status: patent;        Pre-hospital Interventions: none        Hospital Interventions: none  Breathing:        Pre-hospital Interventions: oxygen       Effort: normal       Right breath sounds: normal       Left breath  sounds: normal  Circulation:        Rhythm: regular       Rate: regular   Right Pulses Left Pulses    R radial: 2+    R pedal: 2+     L radial: 2+    L pedal: 2+       Disability:        GCS: Eye: 4; Verbal: 5 Motor: 6 Total: 15       Right Pupil: 3 mm;  round;  reactive         Left Pupil:  3 mm;  round;  reactive      R Motor Strength L Motor Strength    R : 5/5  R dorsiflex: 5/5  R plantarflex: 5/5 L : 5/5  L dorsiflex: 5/5  L plantarflex: 5/5        Sensory:  No sensory deficit  Exposure:           Secondary Survey:  Physical Exam  Constitutional:       General: He is not in acute distress.     Appearance: Normal appearance. He is not ill-appearing, toxic-appearing or diaphoretic.   HENT:      Head: Normocephalic and atraumatic.      Nose: Nose normal.      Mouth/Throat:      Mouth: Mucous membranes are moist.      Pharynx: Oropharynx is clear.   Eyes:      Extraocular Movements: Extraocular movements intact.      Conjunctiva/sclera: Conjunctivae normal.   Cardiovascular:      Rate and Rhythm: Regular rhythm. Tachycardia present.      Heart sounds: Normal heart sounds.   Pulmonary:      Effort: Pulmonary effort is normal.      Breath sounds: Normal breath sounds.   Abdominal:      General: Abdomen is flat. There is no distension.      Palpations: Abdomen is soft.      Tenderness: There is no abdominal tenderness.      Hernia: A hernia (soft, reducible) is present.   Musculoskeletal:         General: Normal range of motion.      Cervical back: Normal range of motion and neck supple.   Skin:     General: Skin is warm and dry.      Findings: No bruising or lesion.   Neurological:      Mental Status: He is alert and oriented to person, place, and time.   Psychiatric:         Mood and Affect: Mood normal.         Behavior: Behavior normal.             Lab Results: I have reviewed the following results:  Recent Labs     02/16/25  1043   WBC 10.07   HGB 14.2   HCT 42.3      SODIUM 135   K 3.8       CO2 24   BUN 13   CREATININE 0.69   GLUC 126   AST 43*   ALT 37   ALB 4.0   TBILI 0.85   ALKPHOS 54   PTT 28   INR 1.05       Imaging Results: I have personally reviewed pertinent images saved in PACS. CT scan findings (and other pertinent positive findings on images) were discussed with radiology. My interpretation of the images/reports are as follows:  Chest Xray(s): N/A   FAST exam(s): N/A   CT Scan(s): Nondisplaced fractures of the right 5th-10th ribs, with small right hydropneumothorax.    Additional Xray(s): N/A     Other Studies: Other Study Results Review: EKG was reviewed.  EKG was personally reviewed and my interpretation is: Sinus Tachycardia. ..

## 2025-02-16 NOTE — ASSESSMENT & PLAN NOTE
HR in 120's upon arrival  Per chart review, patient has h/o of sinus tachycardia  EKG 2/16- NSR with rate of 127  Continue to monitor heart rate while inpatient

## 2025-02-16 NOTE — ED NOTES
Patient provided 2 cups of coffee at request. Offered food from kitchen, patient declined.     Elsa Shah RN  02/16/25 6253
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
## 2025-02-16 NOTE — ED NOTES
Transfer from  to [facility] ED, Dr. Chaparro accepting, p/u time 1130 with [facility], number for report [phone]      Telma Colby RN  02/16/25 5909

## 2025-02-17 ENCOUNTER — ANESTHESIA EVENT (INPATIENT)
Dept: SURGERY | Facility: HOSPITAL | Age: 55
DRG: 183 | End: 2025-02-17
Payer: COMMERCIAL

## 2025-02-17 ENCOUNTER — APPOINTMENT (INPATIENT)
Dept: RADIOLOGY | Facility: HOSPITAL | Age: 55
DRG: 183 | End: 2025-02-17
Payer: COMMERCIAL

## 2025-02-17 ENCOUNTER — ANESTHESIA (INPATIENT)
Dept: SURGERY | Facility: HOSPITAL | Age: 55
DRG: 183 | End: 2025-02-17
Payer: COMMERCIAL

## 2025-02-17 ENCOUNTER — APPOINTMENT (INPATIENT)
Dept: SURGERY | Facility: HOSPITAL | Age: 55
DRG: 183 | End: 2025-02-17
Payer: COMMERCIAL

## 2025-02-17 ENCOUNTER — APPOINTMENT (INPATIENT)
Dept: RADIOLOGY | Facility: HOSPITAL | Age: 55
DRG: 183 | End: 2025-02-17
Attending: STUDENT IN AN ORGANIZED HEALTH CARE EDUCATION/TRAINING PROGRAM
Payer: COMMERCIAL

## 2025-02-17 PROBLEM — S22.41XA FRACTURE OF MULTIPLE RIBS OF RIGHT SIDE: Status: ACTIVE | Noted: 2025-02-17

## 2025-02-17 LAB
APTT PPP: 35 SECONDS (ref 23–34)
ATRIAL RATE: 106 BPM
ERYTHROCYTE [DISTWIDTH] IN BLOOD BY AUTOMATED COUNT: 12.3 % (ref 11.6–15.1)
HCT VFR BLD AUTO: 40.7 % (ref 36.5–49.3)
HGB BLD-MCNC: 13.3 G/DL (ref 12–17)
INR PPP: 1.02 (ref 0.85–1.19)
MCH RBC QN AUTO: 34 PG (ref 26.8–34.3)
MCHC RBC AUTO-ENTMCNC: 32.7 G/DL (ref 31.4–37.4)
MCV RBC AUTO: 104 FL (ref 82–98)
P AXIS: 71 DEGREES
PLATELET # BLD AUTO: 194 THOUSANDS/UL (ref 149–390)
PMV BLD AUTO: 9.6 FL (ref 8.9–12.7)
PR INTERVAL: 180 MS
PROTHROMBIN TIME: 13.7 SECONDS (ref 12.3–15)
QRS AXIS: -62 DEGREES
QRSD INTERVAL: 110 MS
QT INTERVAL: 352 MS
QTC INTERVAL: 468 MS
RBC # BLD AUTO: 3.91 MILLION/UL (ref 3.88–5.62)
T WAVE AXIS: 1 DEGREES
VENTRICULAR RATE: 106 BPM
WBC # BLD AUTO: 6.05 THOUSAND/UL (ref 4.31–10.16)

## 2025-02-17 PROCEDURE — 36415 COLL VENOUS BLD VENIPUNCTURE: CPT

## 2025-02-17 PROCEDURE — 71046 X-RAY EXAM CHEST 2 VIEWS: CPT

## 2025-02-17 PROCEDURE — 93010 ELECTROCARDIOGRAM REPORT: CPT | Performed by: INTERNAL MEDICINE

## 2025-02-17 PROCEDURE — 93005 ELECTROCARDIOGRAM TRACING: CPT

## 2025-02-17 PROCEDURE — 0W9930Z DRAINAGE OF RIGHT PLEURAL CAVITY WITH DRAINAGE DEVICE, PERCUTANEOUS APPROACH: ICD-10-PCS | Performed by: SURGERY

## 2025-02-17 PROCEDURE — 85027 COMPLETE CBC AUTOMATED: CPT

## 2025-02-17 PROCEDURE — 99222 1ST HOSP IP/OBS MODERATE 55: CPT | Performed by: ANESTHESIOLOGY

## 2025-02-17 PROCEDURE — 85730 THROMBOPLASTIN TIME PARTIAL: CPT

## 2025-02-17 PROCEDURE — 85610 PROTHROMBIN TIME: CPT

## 2025-02-17 PROCEDURE — 71045 X-RAY EXAM CHEST 1 VIEW: CPT

## 2025-02-17 PROCEDURE — 99232 SBSQ HOSP IP/OBS MODERATE 35: CPT | Performed by: STUDENT IN AN ORGANIZED HEALTH CARE EDUCATION/TRAINING PROGRAM

## 2025-02-17 PROCEDURE — 32551 INSERTION OF CHEST TUBE: CPT | Performed by: SURGERY

## 2025-02-17 PROCEDURE — 3E0R3BZ INTRODUCTION OF ANESTHETIC AGENT INTO SPINAL CANAL, PERCUTANEOUS APPROACH: ICD-10-PCS | Performed by: ANESTHESIOLOGY

## 2025-02-17 RX ORDER — LIDOCAINE HYDROCHLORIDE AND EPINEPHRINE 10; 10 MG/ML; UG/ML
20 INJECTION, SOLUTION INFILTRATION; PERINEURAL ONCE
Status: COMPLETED | OUTPATIENT
Start: 2025-02-17 | End: 2025-02-17

## 2025-02-17 RX ORDER — LIDOCAINE HYDROCHLORIDE AND EPINEPHRINE 15; 5 MG/ML; UG/ML
INJECTION, SOLUTION EPIDURAL
Status: COMPLETED | OUTPATIENT
Start: 2025-02-17 | End: 2025-02-17

## 2025-02-17 RX ORDER — METHOCARBAMOL 750 MG/1
750 TABLET, FILM COATED ORAL EVERY 6 HOURS SCHEDULED
Status: DISCONTINUED | OUTPATIENT
Start: 2025-02-17 | End: 2025-02-22 | Stop reason: HOSPADM

## 2025-02-17 RX ORDER — LIDOCAINE 50 MG/G
2 PATCH TOPICAL DAILY
Status: DISCONTINUED | OUTPATIENT
Start: 2025-02-17 | End: 2025-02-22 | Stop reason: HOSPADM

## 2025-02-17 RX ORDER — OXYCODONE HYDROCHLORIDE 5 MG/1
5 TABLET ORAL EVERY 4 HOURS PRN
Refills: 0 | Status: DISCONTINUED | OUTPATIENT
Start: 2025-02-17 | End: 2025-02-22 | Stop reason: HOSPADM

## 2025-02-17 RX ORDER — GABAPENTIN 100 MG/1
100 CAPSULE ORAL 3 TIMES DAILY
Status: DISCONTINUED | OUTPATIENT
Start: 2025-02-17 | End: 2025-02-22 | Stop reason: HOSPADM

## 2025-02-17 RX ORDER — OXYCODONE HYDROCHLORIDE 10 MG/1
10 TABLET ORAL EVERY 4 HOURS PRN
Refills: 0 | Status: DISCONTINUED | OUTPATIENT
Start: 2025-02-17 | End: 2025-02-22 | Stop reason: HOSPADM

## 2025-02-17 RX ADMIN — METHOCARBAMOL 750 MG: 750 TABLET ORAL at 21:15

## 2025-02-17 RX ADMIN — METHOCARBAMOL 500 MG: 500 TABLET ORAL at 09:05

## 2025-02-17 RX ADMIN — GABAPENTIN 100 MG: 100 CAPSULE ORAL at 11:01

## 2025-02-17 RX ADMIN — AMLODIPINE BESYLATE 10 MG: 10 TABLET ORAL at 09:05

## 2025-02-17 RX ADMIN — LIDOCAINE HYDROCHLORIDE,EPINEPHRINE BITARTRATE 20 ML: 10; .01 INJECTION, SOLUTION INFILTRATION; PERINEURAL at 21:14

## 2025-02-17 RX ADMIN — ACETAMINOPHEN 975 MG: 325 TABLET, FILM COATED ORAL at 15:59

## 2025-02-17 RX ADMIN — GABAPENTIN 100 MG: 100 CAPSULE ORAL at 21:15

## 2025-02-17 RX ADMIN — GABAPENTIN 100 MG: 100 CAPSULE ORAL at 16:01

## 2025-02-17 RX ADMIN — ACETAMINOPHEN 975 MG: 325 TABLET, FILM COATED ORAL at 06:06

## 2025-02-17 RX ADMIN — METHOCARBAMOL 750 MG: 750 TABLET ORAL at 16:01

## 2025-02-17 RX ADMIN — OXYCODONE HYDROCHLORIDE 10 MG: 10 TABLET ORAL at 12:56

## 2025-02-17 RX ADMIN — Medication: at 15:49

## 2025-02-17 RX ADMIN — LIDOCAINE HYDROCHLORIDE AND EPINEPHRINE 5 ML: 15; 5 INJECTION, SOLUTION EPIDURAL at 14:10

## 2025-02-17 RX ADMIN — HEPARIN SODIUM 5000 UNITS: 5000 INJECTION INTRAVENOUS; SUBCUTANEOUS at 16:01

## 2025-02-17 RX ADMIN — HYDROCHLOROTHIAZIDE 25 MG: 25 TABLET ORAL at 09:05

## 2025-02-17 RX ADMIN — PHENOBARBITAL SODIUM 780 MG: 65 INJECTION INTRAMUSCULAR at 16:53

## 2025-02-17 RX ADMIN — LEVOTHYROXINE SODIUM 75 MCG: 0.07 TABLET ORAL at 09:05

## 2025-02-17 RX ADMIN — HEPARIN SODIUM 5000 UNITS: 5000 INJECTION INTRAVENOUS; SUBCUTANEOUS at 06:05

## 2025-02-17 RX ADMIN — HEPARIN SODIUM 5000 UNITS: 5000 INJECTION INTRAVENOUS; SUBCUTANEOUS at 21:14

## 2025-02-17 RX ADMIN — HYDROMORPHONE HYDROCHLORIDE 0.5 MG: 1 INJECTION, SOLUTION INTRAMUSCULAR; INTRAVENOUS; SUBCUTANEOUS at 00:02

## 2025-02-17 RX ADMIN — METHOCARBAMOL 500 MG: 500 TABLET ORAL at 03:13

## 2025-02-17 RX ADMIN — HYDROMORPHONE HYDROCHLORIDE 0.5 MG: 1 INJECTION, SOLUTION INTRAMUSCULAR; INTRAVENOUS; SUBCUTANEOUS at 03:13

## 2025-02-17 RX ADMIN — HYDROMORPHONE HYDROCHLORIDE 0.5 MG: 1 INJECTION, SOLUTION INTRAMUSCULAR; INTRAVENOUS; SUBCUTANEOUS at 09:05

## 2025-02-17 RX ADMIN — LOSARTAN POTASSIUM 100 MG: 50 TABLET, FILM COATED ORAL at 09:46

## 2025-02-17 RX ADMIN — ACETAMINOPHEN 975 MG: 325 TABLET, FILM COATED ORAL at 21:15

## 2025-02-17 RX ADMIN — HYDROMORPHONE HYDROCHLORIDE 0.5 MG: 1 INJECTION, SOLUTION INTRAMUSCULAR; INTRAVENOUS; SUBCUTANEOUS at 06:06

## 2025-02-17 RX ADMIN — ATORVASTATIN CALCIUM 10 MG: 10 TABLET, FILM COATED ORAL at 16:01

## 2025-02-17 RX ADMIN — LIDOCAINE 2 PATCH: 700 PATCH TOPICAL at 11:01
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
## 2025-02-17 NOTE — PLAN OF CARE
Problem: PAIN - ADULT  Goal: Verbalizes/displays adequate comfort level or baseline comfort level  Description: Interventions:  - Encourage patient to monitor pain and request assistance  - Assess pain using appropriate pain scale  - Administer analgesics based on type and severity of pain and evaluate response  - Implement non-pharmacological measures as appropriate and evaluate response  - Consider cultural and social influences on pain and pain management  - Notify physician/advanced practitioner if interventions unsuccessful or patient reports new pain  Outcome: Progressing     Problem: INFECTION - ADULT  Goal: Absence or prevention of progression during hospitalization  Description: INTERVENTIONS:  - Assess and monitor for signs and symptoms of infection  - Monitor lab/diagnostic results  - Monitor all insertion sites, i.e. indwelling lines, tubes, and drains  - Monitor endotracheal if appropriate and nasal secretions for changes in amount and color  - [place] appropriate cooling/warming therapies per order  - Administer medications as ordered  - Instruct and encourage patient and family to use good hand hygiene technique  - Identify and instruct in appropriate isolation precautions for identified infection/condition  Outcome: Progressing  Goal: Absence of fever/infection during neutropenic period  Description: INTERVENTIONS:  - Monitor WBC    Outcome: Progressing     Problem: DISCHARGE PLANNING  Goal: Discharge to home or other facility with appropriate resources  Description: INTERVENTIONS:  - Identify barriers to discharge w/patient and caregiver  - Arrange for needed discharge resources and transportation as appropriate  - Identify discharge learning needs (meds, wound care, etc.)  - Arrange for interpretive services to assist at discharge as needed  - Refer to Case Management Department for coordinating discharge planning if the patient needs post-hospital services based on physician/advanced  practitioner order or complex needs related to functional status, cognitive ability, or social support system  Outcome: Progressing     Problem: Knowledge Deficit  Goal: Patient/family/caregiver demonstrates understanding of disease process, treatment plan, medications, and discharge instructions  Description: Complete learning assessment and assess knowledge base.  Interventions:  - Provide teaching at level of understanding  - Provide teaching via preferred learning methods  Outcome: Progressing

## 2025-02-17 NOTE — ASSESSMENT & PLAN NOTE
Mechanical fall resulting in Rt. 5-10 rib fractures    Rib Fracture Evaluation:  Chest tube: NA  Respiratory Co-morbidities: General debility  SpO2:   SPO2 RA Rest      Flowsheet Row ED to Hosp-Admission (Current) from 2/16/2025 in [facility] 8   SpO2 95 %   SpO2 Activity At Rest   O2 Device None (Room air)   O2 Flow Rate --                                                                             Incentive Spirometer: Incentive Spirometry Achieved (mL): 2500 mL   Platelet Count:   Results from last 7 days   Lab Units 02/17/25  0406   PLATELETS Thousands/uL 194     Coags:   Results from last 7 days   Lab Units 02/17/25  0406   INR  1.02   PROTIME seconds 13.7     Home anticoagulants: None      Discussed risks and benefits of interventional pain procedures including epidural placement to help with analgesia from rib fractures as well as to optimize pulmonary function. Patient initially did not want any procedure but requested epidural placement after discussing with trauma team so will plan for epidural placement.     Multimodal Analgesia:   - Po tylenol 975mg Q8H   - IV dilaudid 0.5mg Q3H prn for breakthrough pain   - oxycodone 5/10mg Q4H for mod/severe pain   - Lidocaine patches Q12HR   - Gabapentin 100mg TID for neuropathic pain   - PO robaxin 750mg Q6H for muscle spasms   - PRN narcan which taking opioids   - Please hold all AC except for sc heparin while epidural in place

## 2025-02-17 NOTE — CONSULTS
Consultation - Acute Pain   Name: Michael Wilkinson 55 y.o. male I MRN: [id number]  Unit/Bed#: ED 18 I Date of Admission: 2/16/2025   Date of Service: 2/17/2025 I Hospital Day: 1   Consult Acute Pain Service (<66 y/o with at Least 3 Rib Fractures)  Consult performed by: David Crenshaw MD  Consult ordered by: Lazara Talamantes MD        Physician Requesting Evaluation: Panda Chaparro MD   Reason for Evaluation / Principal Problem: Rib fracture protocol     Assessment & Plan  Fracture of multiple ribs of right side  Mechanical fall resulting in Rt. 5-10 rib fractures    Rib Fracture Evaluation:  Chest tube: NA  Respiratory Co-morbidities: General debility  SpO2:   SPO2 RA Rest      Flowsheet Row ED to Hosp-Admission (Current) from 2/16/2025 in [facility] 8   SpO2 95 %   SpO2 Activity At Rest   O2 Device None (Room air)   O2 Flow Rate --                                                                             Incentive Spirometer: Incentive Spirometry Achieved (mL): 2500 mL   Platelet Count:   Results from last 7 days   Lab Units 02/17/25  0406   PLATELETS Thousands/uL 194     Coags:   Results from last 7 days   Lab Units 02/17/25  0406   INR  1.02   PROTIME seconds 13.7     Home anticoagulants: None      Discussed risks and benefits of interventional pain procedures including epidural placement to help with analgesia from rib fractures as well as to optimize pulmonary function. Patient initially did not want any procedure but requested epidural placement after discussing with trauma team so will plan for epidural placement.     Multimodal Analgesia:   - Po tylenol 975mg Q8H   - IV dilaudid 0.5mg Q3H prn for breakthrough pain   - oxycodone 5/10mg Q4H for mod/severe pain   - Lidocaine patches Q12HR   - Gabapentin 100mg TID for neuropathic pain   - PO robaxin 750mg Q6H for muscle spasms   - PRN narcan which taking opioids   - Please hold all AC except for sc heparin while epidural  in place            [facility] will continue to follow. Please contact Acute Pain Service - via SecureChat from [phone] with additional questions or concerns. See SecureChat or [email or web address] for additional contacts and after hours information.     History of Present Illness    HPI: Michael Wilkinson is a 55 y.o. year old male who presents with h/o HTN, HLD, hypothyroidism and alcohol abuse presented after mechanical fall resulting in multiple rt.sided rib fractures. APS team consulted per rib fracture protocol.     Current pain location(s): Pain Score: 9  Pain Location/Orientation: Orientation: Mid, Location: Back, Orientation: Right  Pain Scale: Pain Assessment Tool: 0-10  Current Analgesic regimen:  PO tylenol, prn IV dilaudid, prn oxy     Pain History: None   Pain Management Physician:  REBECCA   I have reviewed the patient's controlled substance dispensing history in the Prescription Drug Monitoring Program in compliance with the [facility] regulations before prescribing any controlled substances.     Review of Systems  Medical History Review: I have reviewed the patient's PMH, PSH, Social History, Family History, Meds, and Allergies     Objective :  Temp:  [97.8 °F (36.6 °C)-98.8 °F (37.1 °C)] 98.8 °F (37.1 °C)  HR:  [] 96  BP: (129-165)/(72-98) 146/91  Resp:  [15-22] 20  SpO2:  [95 %-97 %] 96 %  O2 Device: None (Room air)  Nasal Cannula O2 Flow Rate (L/min):  [2 L/min] 2 L/min    Physical Exam     Lab Results: I have reviewed the following results:  Estimated Creatinine Clearance: 131.2 mL/min (by C-G formula based on SCr of 0.69 mg/dL).  Lab Results   Component Value Date    WBC 6.05 02/17/2025    HGB 13.3 02/17/2025    HCT 40.7 02/17/2025     02/17/2025         Component Value Date/Time    K 3.8 02/16/2025 1043     02/16/2025 1043    CO2 24 02/16/2025 1043    BUN 13 02/16/2025 1043    CREATININE 0.69 02/16/2025 1043         Component Value Date/Time    CALCIUM 8.4 02/16/2025 1043    ALKPHOS 54 02/16/2025 1043    AST 43  (H) 02/16/2025 1043    ALT 37 02/16/2025 1043    TP 7.0 02/16/2025 1043    ALB 4.0 02/16/2025 1043

## 2025-02-17 NOTE — ANESTHESIA PROCEDURE NOTES
Epidural Block    Patient location during procedure: holding area  Start time: 2/17/2025 2:00 PM  Reason for block: procedure for pain  Staffing  Performed by: David Crenshaw MD  Authorized by: David Crenshaw MD    Preanesthetic Checklist  Completed: patient identified, IV checked, site marked, risks and benefits discussed, surgical consent, monitors and equipment checked, pre-op evaluation and timeout performed  Epidural  Patient position: sitting  Prep: ChloraPrep  Sedation Level: no sedation  Patient monitoring: frequent blood pressure checks, continuous pulse oximetry and heart rate  Approach: midline  Location: thoracic, T7-8  Injection technique: FIDENCIO saline  Needle  Needle type: Tuohy   Needle gauge: 18 G  Needle insertion depth: 6.5 cm  Catheter type: multi-orifice  Catheter size: 20 G  Catheter at skin depth: 15 cm  Catheter securement method: stabilization device and clear occlusive dressing  Test dose: negativelidocaine-epinephrine (XYLOCAINE-MPF/EPINEPHRINE) 1.5 %-1:200,000 injection 3 mL - Epidural   5 mL - 2/17/2025 2:10:00 PM  Assessment  Sensory level: T4  Number of attempts: 2negative aspiration for CSF, negative aspiration for heme and no paresthesia on injection  patient tolerated the procedure well with no immediate complications

## 2025-02-17 NOTE — UTILIZATION REVIEW
Initial Clinical Review    Admission: Date/Time/Statement:   Admission Orders (From admission, onward)       Ordered        02/16/25 1242  Inpatient Admission  Once                          Orders Placed This Encounter   Procedures    Inpatient Admission     Standing Status:   Standing     Number of Occurrences:   1     Level of Care:   Med Surg [16]     Estimated length of stay:   More than 2 Midnights     Certification:   I certify that inpatient services are medically necessary for this patient for a duration of greater than two midnights. See H&P and MD Progress Notes for additional information about the patient's course of treatment.     ED Arrival Information       Expected   2/16/2025     Arrival   2/16/2025 12:17    Acuity   Emergent              Means of arrival   Ambulance    Escorted by   [facility] ([place])    Service   Trauma    Admission type   Emergency              Arrival complaint   trauma             Chief Complaint   Patient presents with    Trauma     UB transfer - fell on ice sustaining right posterior 5-10 rib fractures.       Initial Presentation: 55 y.o. male to [facility] transferred from [facility] ED where he initially presented on 2/16 with right sided chest wall and back pain after mechanical fall. Pt states he slipped on ice today and hit his right side on a stone wall. Denies head strike or LOC. Denies AP or AC. On CT, was found to have multiple right-sided rib fractures and small pneumothorax and was transferred to [facility] for trauma eval and further treatment.   PMHx: HTN, HLD, hypothyroidism, alcohol abuse  Exam: GCS 15. Tachycardic. Hernia (soft, reducible) is present.   Admit Inpatient to [facility] unit under trauma service with Multiple right-sided rib fractures (5-10), R PTX  Multimodal analgesic regimen. APS consulted for recs. Encourage incentive spirometer use and adequate pulmonary hygiene. O2 prn, currently on 2L nc. Repeat CXR 6 pm today and in AM. PT/OT evals. Continue PTA po meds. Pt  endorses daily alcohol consumption, ~ 4 beers daily. [facility] protocol for etoh w/d s/s. Senna for bowel regimen. Sq hep and SCDs for DVT ppx. Reg diet. OOB.       Date: 2/17  Day 2: Pt has persistent right sided chest wall pain. Otherwise no acute complaints. VSS, weaned to RA. Repeat CXR this morning showing pneumo appears to have improved significantly, pending final read. Continue multimodal pain regimen, encourage IS, OOB. SCDs, sq hep.        ED Treatment-Medication Administration from 02/16/2025 1100 to 02/17/2025 0959         Date/Time Order Dose Route Action     02/16/2025 1449 oxyCODONE (ROXICODONE) IR tablet 5 mg 5 mg Oral Given     02/16/2025 1857 oxyCODONE (ROXICODONE) IR tablet 5 mg 5 mg Oral Given     02/16/2025 2114 oxyCODONE (ROXICODONE) split tablet 2.5 mg 2.5 mg Oral Given     02/16/2025 1757 HYDROmorphone (DILAUDID) injection 0.5 mg 0.5 mg Intravenous Given     02/17/2025 0002 HYDROmorphone (DILAUDID) injection 0.5 mg 0.5 mg Intravenous Given     02/17/2025 0313 HYDROmorphone (DILAUDID) injection 0.5 mg 0.5 mg Intravenous Given     02/17/2025 0606 HYDROmorphone (DILAUDID) injection 0.5 mg 0.5 mg Intravenous Given     02/17/2025 0905 HYDROmorphone (DILAUDID) injection 0.5 mg 0.5 mg Intravenous Given     02/16/2025 1451 acetaminophen (TYLENOL) tablet 975 mg 975 mg Oral Given     02/16/2025 2114 acetaminophen (TYLENOL) tablet 975 mg 975 mg Oral Given     02/17/2025 0606 acetaminophen (TYLENOL) tablet 975 mg 975 mg Oral Given     02/16/2025 1449 methocarbamol (ROBAXIN) tablet 500 mg 500 mg Oral Given     02/16/2025 2115 methocarbamol (ROBAXIN) tablet 500 mg 500 mg Oral Given     02/17/2025 0313 methocarbamol (ROBAXIN) tablet 500 mg 500 mg Oral Given     02/17/2025 0905 methocarbamol (ROBAXIN) tablet 500 mg 500 mg Oral Given     02/16/2025 1745 levothyroxine tablet 75 mcg 75 mcg Oral Given     02/17/2025 0905 levothyroxine tablet 75 mcg 75 mcg Oral Given     02/16/2025 1450 losartan (COZAAR) tablet 100 mg  100 mg Oral Given     02/17/2025 0946 losartan (COZAAR) tablet 100 mg 100 mg Oral Given     02/16/2025 1450 amLODIPine (NORVASC) tablet 10 mg 10 mg Oral Given     02/17/2025 0905 amLODIPine (NORVASC) tablet 10 mg 10 mg Oral Given     02/16/2025 1747 hydroCHLOROthiazide tablet 25 mg 25 mg Oral Given     02/17/2025 0905 hydroCHLOROthiazide tablet 25 mg 25 mg Oral Given     02/16/2025 1748 atorvastatin (LIPITOR) tablet 10 mg 10 mg Oral Given     02/16/2025 1451 heparin (porcine) subcutaneous injection 5,000 Units 5,000 Units Subcutaneous Given     02/16/2025 2114 heparin (porcine) subcutaneous injection 5,000 Units 5,000 Units Subcutaneous Given     02/17/2025 0605 heparin (porcine) subcutaneous injection 5,000 Units 5,000 Units Subcutaneous Given       Scheduled Medications:  acetaminophen, 975 mg, Oral, Q8H LEO  losartan, 100 mg, Oral, Daily   And  amLODIPine, 10 mg, Oral, Daily   And  hydroCHLOROthiazide, 25 mg, Oral, Daily  atorvastatin, 10 mg, Oral, Daily With Dinner  heparin (porcine), 5,000 Units, Subcutaneous, Q8H LEO  levothyroxine, 75 mcg, Oral, Daily  senna-docusate sodium, 1 tablet, Oral, HS    PRN Meds:  HYDROmorphone, 0.5 mg, Intravenous, Q3H PRN 2/16 x1, 2/17 x4  methocarbamol, 500 mg, Oral, Q6H PRN 2/16 x2, 2/17 x2  oxyCODONE, 5 mg, Oral, Q4H PRN 2/16 x2  oxyCODONE, 2.5 mg, Oral, Q4H PRN 2/16 x1      ED Triage Vitals [02/16/25 1230]   Temperature Pulse Respirations Blood Pressure SpO2 Pain Score   97.8 °F (36.6 °C) (!) 119 15 136/87 96 % 2     Weight (last 2 days)       None            Vital Signs (last 3 days)       Date/Time Temp Pulse Resp BP MAP (mmHg) SpO2 Calculated FIO2 (%) - Nasal Cannula Nasal Cannula O2 Flow Rate (L/min) O2 Device Patient Position - Orthostatic VS Cedric Coma Scale Score CIWA-Ar Total Pain    02/17/25 0905 -- -- -- 146/91 -- -- -- -- -- -- -- -- 9    02/17/25 0700 98.8 °F (37.1 °C) 96 20 133/83 100 96 % -- -- None (Room air) -- 15 -- No Pain    02/17/25 0500 -- 80 16 132/78  99 96 % -- -- None (Room air) Lying -- -- --    02/17/25 0430 -- 82 16 129/72 -- 97 % -- -- None (Room air) -- -- -- --    02/17/25 0400 -- -- -- -- -- -- -- -- -- -- -- -- 4 02/17/25 0300 -- -- -- -- -- -- -- -- -- -- 15 -- 5 02/17/25 0000 -- -- -- -- -- -- -- -- -- -- -- -- 5 02/16/25 2300 -- -- -- -- -- -- -- -- -- -- 15 -- --    02/16/25 2130 -- 94 16 136/83 102 96 % -- -- None (Room air) -- -- -- --    02/16/25 2100 -- -- -- -- -- -- -- -- -- -- -- -- 4 02/16/25 1900 -- -- -- -- -- -- -- -- -- -- 15 -- 4 02/16/25 1857 -- -- -- -- -- -- -- -- -- -- -- -- 7 02/16/25 1757 -- -- -- -- -- -- -- -- -- -- -- -- 10 - Worst Possible Pain    02/16/25 1700 -- 112 15 130/80 99 97 % -- -- None (Room air) Lying -- -- 10 - Worst Possible Pain    02/16/25 1455 -- 75 18 142/86 -- 97 % 28 2 L/min Nasal cannula Sitting -- 4 8 02/16/25 1449 -- -- -- 142/86 -- -- -- -- -- -- -- -- 8 02/16/25 1312 -- -- -- -- -- -- -- -- -- -- -- -- 2    02/16/25 1230 97.8 °F (36.6 °C) 119 15 136/87 -- 96 % -- -- Nasal cannula Sitting 15 -- 2    02/16/25 1222 -- -- -- -- -- -- -- -- -- -- 15 -- --        CIWA-Ar Score       Row Name 02/16/25 1455             CIWA-Ar    /86      Pulse 75      Nausea and Vomiting 0      Tactile Disturbances 0      Tremor 4      Auditory Disturbances 0      Paroxysmal Sweats 0      Visual Disturbances 0      Anxiety 0      Headache, Fullness in Head 0      Agitation 0      Orientation and Clouding of Sensorium 0      CIWA-Ar Total 4                      Pertinent Labs/Diagnostic Test Results:   Radiology:  XR chest portable    (Results Pending)   XR chest portable ICU    (Results Pending)         Results from last 7 days   Lab Units 02/17/25  0406 02/16/25  1043 02/14/25  1149   WBC Thousand/uL 6.05 10.07 10.89*   HEMOGLOBIN g/dL 13.3 14.2 14.7   HEMATOCRIT % 40.7 42.3 43.2   PLATELETS Thousands/uL 194 187 216   TOTAL NEUT ABS Thousands/µL  --  8.05* 8.03*     Results from last 7 days    Lab Units 02/16/25  1043 02/14/25  1149   SODIUM mmol/L 135 135   POTASSIUM mmol/L 3.8 3.5   CHLORIDE mmol/L 102 98   CO2 mmol/L 24 25   ANION GAP mmol/L 9 12   BUN mg/dL 13 17   CREATININE mg/dL 0.69 0.82   EGFR ml/min/1.73sq m 106 98   CALCIUM mg/dL 8.4 9.3     Results from last 7 days   Lab Units 02/16/25  1043 02/14/25  1149   AST U/L 43* 44*   ALT U/L 37 36   ALK PHOS U/L 54 61   TOTAL PROTEIN g/dL 7.0 7.5   ALBUMIN g/dL 4.0 4.5   TOTAL BILIRUBIN mg/dL 0.85 0.93       Results from last 7 days   Lab Units 02/16/25  1043   GLUCOSE RANDOM mg/dL 126     Results from last 7 days   Lab Units 02/17/25  0406 02/16/25  1043   PROTIME seconds 13.7 14.2   INR  1.02 1.05   PTT seconds 35* 28     Results from last 7 days   Lab Units 02/14/25  1149   TSH 3RD GENERATON uIU/mL 1.124       Past Medical History:   Diagnosis Date    Disease of thyroid gland     Diverticulitis     Hypertension      Present on Admission:  **None**      Admitting Diagnosis: No admission diagnoses are documented for this encounter.  Age/Sex: 55 y.o. male    Network Utilization Review Department  ATTENTION: Please call with any questions or concerns to [phone] and carefully listen to the prompts so that you are directed to the right person. All voicemails are confidential.   For Discharge needs, contact Care Management DC Support Team at [phone] opt. 2  Send all requests for admission clinical reviews, approved or denied determinations and any other requests to dedicated fax number below belonging to the campus where the patient is receiving treatment. List of dedicated fax numbers for the Facilities:  FACILITY NAME UR FAX NUMBER   ADMISSION DENIALS (Administrative/Medical Necessity) [phone]   DISCHARGE SUPPORT TEAM (NETWORK) [phone]   PARENT CHILD HEALTH (Maternity/NICU/Pediatrics) [phone]   [facility] [phone]   [facility] [phone]   [facility]  [facility] [phone]   [facility] [phone]   [facility] [phone]   [facility] [phone]   [facility] [phone]   [facility] [phone]   [facility] [phone]   [facility] [phone]   [facility] [phone]   [facility] [phone]

## 2025-02-17 NOTE — PROGRESS NOTES
Progress Note - Trauma   Name: Michael Wilkinson 55 y.o. male I MRN: [id number]  Unit/Bed#: ED 18 I Date of Admission: 2/16/2025   Date of Service: 2/17/2025 I Hospital Day: 1     Assessment & Plan  Closed fracture of multiple ribs of right side, initial encounter  - Multiple right-sided rib fractures (5-10), present on admission.  - Continue rib fracture protocol.  - Continue to encourage incentive spirometer use and adequate pulmonary hygiene.    - Appreciate APS evaluation and recommendations.  - Continue multimodal analgesic regimen.  - Supplemental oxygen via nasal cannula as needed to maintain saturations greater than or equal to 94%.  - Repeat chest x-ray's at 6 pm today and 6 am tomorrow   - PT and OT evaluation and treatment as indicated.  - Outpatient follow-up in the trauma clinic for re-evaluation in approximately 2 weeks.  Traumatic pneumothorax, initial encounter  Present on 2/16 CT chest in setting of multiple rib fractures  Repeat CXR this morning showing pneumo appears to have improved significantly, pending final read   Other specified hypothyroidism  Continue home levothyroxine   Hypertension, unspecified type  Continue home losartan, amlodipine, and HCTZ   Hyperlipidemia, unspecified hyperlipidemia type  Continue home atorvastatin   Alcohol abuse  Patient endorses daily alcohol consumption, approx. 4 beers daily   Last drink evening of 2/15  Was anxious, tachycardic, and tremulous at [facility] and given ativan  Asymptomatic upon arrival to [facility], though persistently tachycardic  CIWA protocol ordered  Sinus tachycardia  HR in 120's upon arrival  Per chart review, patient has h/o of sinus tachycardia  EKG 2/16- NSR with rate of 127  Continue to monitor heart rate while inpatient    VTE Prophylaxis: Heparin     Disposition: continue inpatient management on trauma service   Medications reviewed. Continue current medications at prescribed doses.    TRAUMA TERTIARY SURVEY  Summary of Diagnosed Injuries:  multiple right sided rib fractures, right sided pneumo    Transfer from: [facility] ED    Mechanism of Injury:Fall     Chief Complaint: right sided chest pain, SOB    24 Hour Events : No acute events overnight   Subjective : Patient has persistent right sided chest wall pain. Otherwise no acute complaints.     Objective :  Temp:  [97.8 °F (36.6 °C)-98 °F (36.7 °C)] 97.8 °F (36.6 °C)  HR:  [] 80  BP: (129-165)/(72-98) 132/78  Resp:  [15-22] 16  SpO2:  [95 %-97 %] 96 %  O2 Device: None (Room air)  Nasal Cannula O2 Flow Rate (L/min):  [2 L/min] 2 L/min    I/O       None            Physical Exam  Constitutional:       General: He is not in acute distress.     Appearance: Normal appearance. He is not ill-appearing, toxic-appearing or diaphoretic.      Comments: Sitting upright in bed   HENT:      Head: Normocephalic and atraumatic.      Nose: Nose normal.      Mouth/Throat:      Mouth: Mucous membranes are moist.      Pharynx: Oropharynx is clear.   Eyes:      Extraocular Movements: Extraocular movements intact.      Conjunctiva/sclera: Conjunctivae normal.      Pupils: Pupils are equal, round, and reactive to light.   Cardiovascular:      Rate and Rhythm: Normal rate and regular rhythm.      Heart sounds: Normal heart sounds.   Pulmonary:      Effort: Pulmonary effort is normal.      Breath sounds: Normal breath sounds.   Musculoskeletal:         General: Normal range of motion.      Cervical back: Normal range of motion and neck supple.   Skin:     General: Skin is warm and dry.   Neurological:      Mental Status: He is alert and oriented to person, place, and time.   Psychiatric:         Mood and Affect: Mood normal.         Behavior: Behavior normal.           PIC Score  PIC Pain Score: 3 (2/17/2025  4:00 AM)  PIC Incentive Spirometry Score: 4 (2/17/2025  4:00 AM)  PIC Cough Description: 3 (2/17/2025  4:00 AM)  PIC Total Score: 10 (2/17/2025  4:00 AM)       If the Total PIC Score </=5, did you consult APS and  evaluate patient for further intervention?: yes      Pain:    Incentive Spirometry  Cough  3 = Controlled  4 = Above goal volume 3 = Strong  2 = Moderate  3 = Goal to alert volume 2 = Weak  1 = Severe  2 = Below alert volume 1 = Absent     1 = Unable to perform IS          Lab Results: I have reviewed the following results:  Recent Labs     02/16/25  1043 02/17/25  0406   WBC 10.07 6.05   HGB 14.2 13.3   HCT 42.3 40.7    194   SODIUM 135  --    K 3.8  --      --    CO2 24  --    BUN 13  --    CREATININE 0.69  --    GLUC 126  --    AST 43*  --    ALT 37  --    ALB 4.0  --    TBILI 0.85  --    ALKPHOS 54  --    PTT 28 35*   INR 1.05 1.02       I have review imaging reports from  Ed  Other Study Results Review: No additional pertinent studies reviewed.

## 2025-02-17 NOTE — TELEPHONE ENCOUNTER
Contacted the patient who is currently an inpatient at [facility].  Patient states he fell yesterday and sustained multiple fractured ribs and chest received an epidural.  Discussed with patient his lab results that I received at this time.  His stool studies had not been completed and he stated he did not get a chance to collect the stool studies.  It was important to get the stool studies noting the patient had current diarrhea and states he still has diarrhea in order to potentially schedule patient for his follow-up colonoscopy which was overdue in 2022.  Patient will communicate with the office upon discharge however it was mentioned to the patient that he should discuss with his current team if he is still having diarrhea.

## 2025-02-17 NOTE — ASSESSMENT & PLAN NOTE
- Multiple right-sided rib fractures (5-10), present on admission.  - Continue rib fracture protocol.  - Continue to encourage incentive spirometer use and adequate pulmonary hygiene.    - Appreciate APS evaluation and recommendations.  - Continue multimodal analgesic regimen.  - Supplemental oxygen via nasal cannula as needed to maintain saturations greater than or equal to 94%.  - Repeat chest x-ray's at 6 pm today and 6 am tomorrow   - PT and OT evaluation and treatment as indicated.  - Outpatient follow-up in the trauma clinic for re-evaluation in approximately 2 weeks.

## 2025-02-18 ENCOUNTER — APPOINTMENT (INPATIENT)
Dept: RADIOLOGY | Facility: HOSPITAL | Age: 55
DRG: 183 | End: 2025-02-18
Payer: COMMERCIAL

## 2025-02-18 PROBLEM — S27.0XXA TRAUMATIC PNEUMOTHORAX: Status: ACTIVE | Noted: 2025-02-18

## 2025-02-18 PROBLEM — E03.8 OTHER SPECIFIED HYPOTHYROIDISM: Status: ACTIVE | Noted: 2025-02-18

## 2025-02-18 PROBLEM — I10 HYPERTENSION: Status: ACTIVE | Noted: 2025-02-18

## 2025-02-18 LAB
ANION GAP SERPL CALCULATED.3IONS-SCNC: 10 MMOL/L (ref 4–13)
ATRIAL RATE: 135 BPM
BASOPHILS # BLD AUTO: 0.02 THOUSANDS/ΜL (ref 0–0.1)
BASOPHILS NFR BLD AUTO: 0 % (ref 0–1)
BUN SERPL-MCNC: 11 MG/DL (ref 5–25)
CALCIUM SERPL-MCNC: 8.5 MG/DL (ref 8.4–10.2)
CHLORIDE SERPL-SCNC: 97 MMOL/L (ref 96–108)
CO2 SERPL-SCNC: 26 MMOL/L (ref 21–32)
CREAT SERPL-MCNC: 0.64 MG/DL (ref 0.6–1.3)
EOSINOPHIL # BLD AUTO: 0.31 THOUSAND/ΜL (ref 0–0.61)
EOSINOPHIL NFR BLD AUTO: 4 % (ref 0–6)
ERYTHROCYTE [DISTWIDTH] IN BLOOD BY AUTOMATED COUNT: 11.9 % (ref 11.6–15.1)
GFR SERPL CREATININE-BSD FRML MDRD: 109 ML/MIN/1.73SQ M
GLUCOSE SERPL-MCNC: 120 MG/DL (ref 65–140)
HCT VFR BLD AUTO: 39.3 % (ref 36.5–49.3)
HGB BLD-MCNC: 13.2 G/DL (ref 12–17)
IMM GRANULOCYTES # BLD AUTO: 0.03 THOUSAND/UL (ref 0–0.2)
IMM GRANULOCYTES NFR BLD AUTO: 0 % (ref 0–2)
LYMPHOCYTES # BLD AUTO: 1.22 THOUSANDS/ΜL (ref 0.6–4.47)
LYMPHOCYTES NFR BLD AUTO: 16 % (ref 14–44)
MAGNESIUM SERPL-MCNC: 1.9 MG/DL (ref 1.9–2.7)
MCH RBC QN AUTO: 34.1 PG (ref 26.8–34.3)
MCHC RBC AUTO-ENTMCNC: 33.6 G/DL (ref 31.4–37.4)
MCV RBC AUTO: 102 FL (ref 82–98)
MONOCYTES # BLD AUTO: 1.08 THOUSAND/ΜL (ref 0.17–1.22)
MONOCYTES NFR BLD AUTO: 14 % (ref 4–12)
NEUTROPHILS # BLD AUTO: 5.05 THOUSANDS/ΜL (ref 1.85–7.62)
NEUTS SEG NFR BLD AUTO: 66 % (ref 43–75)
NRBC BLD AUTO-RTO: 0 /100 WBCS
P AXIS: 69 DEGREES
PHOSPHATE SERPL-MCNC: 2.7 MG/DL (ref 2.7–4.5)
PLATELET # BLD AUTO: 174 THOUSANDS/UL (ref 149–390)
PMV BLD AUTO: 8.9 FL (ref 8.9–12.7)
POTASSIUM SERPL-SCNC: 3.1 MMOL/L (ref 3.5–5.3)
PR INTERVAL: 128 MS
QRS AXIS: -66 DEGREES
QRSD INTERVAL: 110 MS
QT INTERVAL: 300 MS
QTC INTERVAL: 450 MS
RBC # BLD AUTO: 3.87 MILLION/UL (ref 3.88–5.62)
SODIUM SERPL-SCNC: 133 MMOL/L (ref 135–147)
T WAVE AXIS: 83 DEGREES
VENTRICULAR RATE: 135 BPM
WBC # BLD AUTO: 7.71 THOUSAND/UL (ref 4.31–10.16)

## 2025-02-18 PROCEDURE — 97166 OT EVAL MOD COMPLEX 45 MIN: CPT

## 2025-02-18 PROCEDURE — 99233 SBSQ HOSP IP/OBS HIGH 50: CPT | Performed by: ANESTHESIOLOGY

## 2025-02-18 PROCEDURE — 84100 ASSAY OF PHOSPHORUS: CPT

## 2025-02-18 PROCEDURE — 83735 ASSAY OF MAGNESIUM: CPT

## 2025-02-18 PROCEDURE — 93010 ELECTROCARDIOGRAM REPORT: CPT | Performed by: INTERNAL MEDICINE

## 2025-02-18 PROCEDURE — 99232 SBSQ HOSP IP/OBS MODERATE 35: CPT | Performed by: STUDENT IN AN ORGANIZED HEALTH CARE EDUCATION/TRAINING PROGRAM

## 2025-02-18 PROCEDURE — 80048 BASIC METABOLIC PNL TOTAL CA: CPT

## 2025-02-18 PROCEDURE — 85025 COMPLETE CBC W/AUTO DIFF WBC: CPT

## 2025-02-18 PROCEDURE — 71045 X-RAY EXAM CHEST 1 VIEW: CPT

## 2025-02-18 PROCEDURE — 97162 PT EVAL MOD COMPLEX 30 MIN: CPT

## 2025-02-18 RX ADMIN — METHOCARBAMOL 750 MG: 750 TABLET ORAL at 17:04

## 2025-02-18 RX ADMIN — HEPARIN SODIUM 5000 UNITS: 5000 INJECTION INTRAVENOUS; SUBCUTANEOUS at 05:15

## 2025-02-18 RX ADMIN — Medication 9 MG: at 22:59

## 2025-02-18 RX ADMIN — HEPARIN SODIUM 5000 UNITS: 5000 INJECTION INTRAVENOUS; SUBCUTANEOUS at 14:39

## 2025-02-18 RX ADMIN — METHOCARBAMOL 750 MG: 750 TABLET ORAL at 05:15

## 2025-02-18 RX ADMIN — LEVOTHYROXINE SODIUM 75 MCG: 0.07 TABLET ORAL at 09:02

## 2025-02-18 RX ADMIN — ATORVASTATIN CALCIUM 10 MG: 10 TABLET, FILM COATED ORAL at 17:04

## 2025-02-18 RX ADMIN — AMLODIPINE BESYLATE 10 MG: 10 TABLET ORAL at 09:02

## 2025-02-18 RX ADMIN — ACETAMINOPHEN 975 MG: 325 TABLET, FILM COATED ORAL at 14:38

## 2025-02-18 RX ADMIN — GABAPENTIN 100 MG: 100 CAPSULE ORAL at 09:02

## 2025-02-18 RX ADMIN — GABAPENTIN 100 MG: 100 CAPSULE ORAL at 17:04

## 2025-02-18 RX ADMIN — ROPIVACAINE HYDROCHLORIDE: 5 INJECTION EPIDURAL; INFILTRATION; PERINEURAL at 15:54

## 2025-02-18 RX ADMIN — LOSARTAN POTASSIUM 100 MG: 50 TABLET, FILM COATED ORAL at 09:02

## 2025-02-18 RX ADMIN — GABAPENTIN 100 MG: 100 CAPSULE ORAL at 22:58

## 2025-02-18 RX ADMIN — HYDROCHLOROTHIAZIDE 25 MG: 25 TABLET ORAL at 09:02

## 2025-02-18 RX ADMIN — METHOCARBAMOL 750 MG: 750 TABLET ORAL at 11:45

## 2025-02-18 RX ADMIN — HEPARIN SODIUM 5000 UNITS: 5000 INJECTION INTRAVENOUS; SUBCUTANEOUS at 22:59

## 2025-02-18 RX ADMIN — METHOCARBAMOL 750 MG: 750 TABLET ORAL at 22:58

## 2025-02-18 RX ADMIN — ACETAMINOPHEN 975 MG: 325 TABLET, FILM COATED ORAL at 05:15

## 2025-02-18 RX ADMIN — SENNOSIDES AND DOCUSATE SODIUM 1 TABLET: 50; 8.6 TABLET ORAL at 22:59

## 2025-02-18 RX ADMIN — ACETAMINOPHEN 975 MG: 325 TABLET, FILM COATED ORAL at 22:58

## 2025-02-18 NOTE — PLAN OF CARE
Problem: Potential for Falls  Goal: Patient will remain free of falls  Description: INTERVENTIONS:  - Educate patient/family on patient safety including physical limitations  - Instruct patient to call for assistance with activity   - Consult OT/PT to assist with strengthening/mobility   - Keep Call bell within reach  - Keep bed low and locked with side rails adjusted as appropriate  - Keep care items and personal belongings within reach  - Initiate and maintain comfort rounds  - Make Fall Risk Sign visible to staff  - Apply yellow socks and bracelet for high fall risk patients  - Consider moving patient to room near nurses station  Outcome: Progressing     Problem: PAIN - ADULT  Goal: Verbalizes/displays adequate comfort level or baseline comfort level  Description: Interventions:  - Encourage patient to monitor pain and request assistance  - Assess pain using appropriate pain scale  - Administer analgesics based on type and severity of pain and evaluate response  - Implement non-pharmacological measures as appropriate and evaluate response  - Consider cultural and social influences on pain and pain management  - Notify physician/advanced practitioner if interventions unsuccessful or patient reports new pain  Outcome: Progressing     Problem: INFECTION - ADULT  Goal: Absence or prevention of progression during hospitalization  Description: INTERVENTIONS:  - Assess and monitor for signs and symptoms of infection  - Monitor lab/diagnostic results  - Monitor all insertion sites, i.e. indwelling lines, tubes, and drains  - Monitor endotracheal if appropriate and nasal secretions for changes in amount and color  - [place] appropriate cooling/warming therapies per order  - Administer medications as ordered  - Instruct and encourage patient and family to use good hand hygiene technique  - Identify and instruct in appropriate isolation precautions for identified infection/condition  Outcome: Progressing  Goal: Absence  of fever/infection during neutropenic period  Description: INTERVENTIONS:  - Monitor WBC    Outcome: Progressing     Problem: SAFETY ADULT  Goal: Patient will remain free of falls  Description: INTERVENTIONS:  - Educate patient/family on patient safety including physical limitations  - Instruct patient to call for assistance with activity   - Consult OT/PT to assist with strengthening/mobility   - Keep Call bell within reach  - Keep bed low and locked with side rails adjusted as appropriate  - Keep care items and personal belongings within reach  - Initiate and maintain comfort rounds  - Make Fall Risk Sign visible to staff  - Apply yellow socks and bracelet for high fall risk patients  - Consider moving patient to room near nurses station  Outcome: Progressing  Goal: Maintain or return to baseline ADL function  Description: INTERVENTIONS:  -  Assess patient's ability to carry out ADLs; assess patient's baseline for ADL function and identify physical deficits which impact ability to perform ADLs (bathing, care of mouth/teeth, toileting, grooming, dressing, etc.)  - Assess/evaluate cause of self-care deficits   - Assess range of motion  - Assess patient's mobility; develop plan if impaired  - Assess patient's need for assistive devices and provide as appropriate  - Encourage maximum independence but intervene and supervise when necessary  - Involve family in performance of ADLs  - Assess for home care needs following discharge   - Consider OT consult to assist with ADL evaluation and planning for discharge  - Provide patient education as appropriate  Outcome: Progressing  Goal: Maintains/Returns to pre admission functional level  Description: INTERVENTIONS:  - Perform AM-PAC 6 Click Basic Mobility/ Daily Activity assessment daily.  - Set and communicate daily mobility goal to care team and patient/family/caregiver.   - Collaborate with rehabilitation services on mobility goals if consulted  - Out of bed for  toileting  - Record patient progress and toleration of activity level   Outcome: Progressing     Problem: DISCHARGE PLANNING  Goal: Discharge to home or other facility with appropriate resources  Description: INTERVENTIONS:  - Identify barriers to discharge w/patient and caregiver  - Arrange for needed discharge resources and transportation as appropriate  - Identify discharge learning needs (meds, wound care, etc.)  - Arrange for interpretive services to assist at discharge as needed  - Refer to Case Management Department for coordinating discharge planning if the patient needs post-hospital services based on physician/advanced practitioner order or complex needs related to functional status, cognitive ability, or social support system  Outcome: Progressing     Problem: Knowledge Deficit  Goal: Patient/family/caregiver demonstrates understanding of disease process, treatment plan, medications, and discharge instructions  Description: Complete learning assessment and assess knowledge base.  Interventions:  - Provide teaching at level of understanding  - Provide teaching via preferred learning methods  Outcome: Progressing     Problem: Prexisting or High Potential for Compromised Skin Integrity  Goal: Skin integrity is maintained or improved  Description: INTERVENTIONS:  - Identify patients at risk for skin breakdown  - Assess and monitor skin integrity  - Assess and monitor nutrition and hydration status  - Monitor labs   - Assess for incontinence   - Turn and reposition patient  - Assist with mobility/ambulation  - Relieve pressure over bony prominences  - Avoid friction and shearing  - Provide appropriate hygiene as needed including keeping skin clean and dry  - Evaluate need for skin moisturizer/barrier cream  - Collaborate with interdisciplinary team   - Patient/family teaching  - Consider wound care consult   Outcome: Progressing

## 2025-02-18 NOTE — ASSESSMENT & PLAN NOTE
Mechanical fall resulting in Rt. 5-10 rib fractures    Rib Fracture Evaluation:  Chest tube: NA  Respiratory Co-morbidities: General debility  SpO2:   SPO2 RA Rest      Flowsheet Row ED to Hosp-Admission (Current) from 2/16/2025 in [facility] 8   SpO2 95 %   SpO2 Activity At Rest   O2 Device None (Room air)   O2 Flow Rate --                                                                             Incentive Spirometer: Incentive Spirometry Achieved (mL): 2000 mL   Platelet Count:   Results from last 7 days   Lab Units 02/18/25  0525   PLATELETS Thousands/uL 174     Coags:   Results from last 7 days   Lab Units 02/17/25  0406   INR  1.02   PROTIME seconds 13.7     Home anticoagulants: None      Increase PCEA to 10/4/10/3 for better analgesia     Multimodal Analgesia:   - Po tylenol 975mg Q8H   - IV dilaudid 0.5mg Q3H prn for breakthrough pain   - oxycodone 5/10mg Q4H for mod/severe pain   - Lidocaine patches Q12HR   - Gabapentin 100mg TID for neuropathic pain   - PO robaxin 750mg Q6H for muscle spasms   - PRN narcan which taking opioids   - Please hold all AC except for sc heparin while epidural in place

## 2025-02-18 NOTE — PROGRESS NOTES
Progress Note - Acute Pain   Name: Michael Wilkinson 55 y.o. male I MRN: [id number]  Unit/Bed#: [facility] 829-01 I Date of Admission: 2/16/2025   Date of Service: 2/18/2025 I Hospital Day: 2    Assessment & Plan  Fracture of multiple ribs of right side  Mechanical fall resulting in Rt. 5-10 rib fractures    Rib Fracture Evaluation:  Chest tube: NA  Respiratory Co-morbidities: General debility  SpO2:   SPO2 RA Rest      Flowsheet Row ED to Hosp-Admission (Current) from 2/16/2025 in [facility] 8   SpO2 95 %   SpO2 Activity At Rest   O2 Device None (Room air)   O2 Flow Rate --                                                                             Incentive Spirometer: Incentive Spirometry Achieved (mL): 2000 mL   Platelet Count:   Results from last 7 days   Lab Units 02/18/25  0525   PLATELETS Thousands/uL 174     Coags:   Results from last 7 days   Lab Units 02/17/25  0406   INR  1.02   PROTIME seconds 13.7     Home anticoagulants: None      Increase PCEA to 10/4/10/3 for better analgesia     Multimodal Analgesia:   - Po tylenol 975mg Q8H   - IV dilaudid 0.5mg Q3H prn for breakthrough pain   - oxycodone 5/10mg Q4H for mod/severe pain   - Lidocaine patches Q12HR   - Gabapentin 100mg TID for neuropathic pain   - PO robaxin 750mg Q6H for muscle spasms   - PRN narcan which taking opioids   - Please hold all AC except for sc heparin while epidural in place      Traumatic pneumothorax    Other specified hypothyroidism    Hypertension      APS will continue to follow. Please contact Acute Pain Service - via SecureChat from [phone] with additional questions or concerns. See SecureInterhyp or Sencera for additional contacts and after hours information.     Subjective   Michael Wilkinson is a 55 y.o. male who presents with h/o HTN, HLD, hypothyroidism and alcohol abuse presented after mechanical fall resulting in multiple rt.sided rib fractures. APS team consulted per rib fracture protocol.     Pain History  Current  pain location(s):  Pain Score: 5  Pain Location/Orientation: Orientation: Right, Location: Rib Cage  Pain Scale: Pain Assessment Tool: 0-10  24 hour history: Patient was seen resting in bed, appears to be comfortable. States that his pain has improved about 50% after the epidural was placed yesterday and that he did not require any additional pain medication. Discussed that we will plan to increase the rate of the epidural for better analgesia from rib fractures and that he can use the PCEA button as needed.     Opioid requirement previous 24 hours: IV dilaudid 0.5mg x1 + oxy 10mg   Meds/Allergies   all current active meds have been reviewed  No Known Allergies  Objective :  Temp:  [97.9 °F (36.6 °C)-99.7 °F (37.6 °C)] 97.9 °F (36.6 °C)  HR:  [] 102  BP: (108-134)/(75-92) 131/76  Resp:  [16-20] 16  SpO2:  [88 %-95 %] 95 %  O2 Device: None (Room air)    Physical Exam  Vitals and nursing note reviewed.   Constitutional:       Appearance: Normal appearance.   HENT:      Head: Normocephalic.      Mouth/Throat:      Mouth: Mucous membranes are moist.   Cardiovascular:      Rate and Rhythm: Tachycardia present.   Pulmonary:      Effort: Pulmonary effort is normal.   Musculoskeletal:         General: Normal range of motion.      Cervical back: Normal range of motion.   Skin:     General: Skin is warm and dry.   Neurological:      General: No focal deficit present.      Mental Status: He is alert and oriented to person, place, and time.      Epidural: Site clean/dry/intact, no surrounding erythema/edema/induration, infusion functioning appropriately        Lab Results: I have reviewed the following results:  Estimated Creatinine Clearance: 141.5 mL/min (by C-G formula based on SCr of 0.64 mg/dL).  Lab Results   Component Value Date    WBC 7.71 02/18/2025    HGB 13.2 02/18/2025    HCT 39.3 02/18/2025     02/18/2025         Component Value Date/Time    K 3.1 (L) 02/18/2025 0525    CL 97 02/18/2025 0525    CO2 26  02/18/2025 0525    BUN 11 02/18/2025 0525    CREATININE 0.64 02/18/2025 0525         Component Value Date/Time    CALCIUM 8.5 02/18/2025 0525    ALKPHOS 54 02/16/2025 1043    AST 43 (H) 02/16/2025 1043    ALT 37 02/16/2025 1043    TP 7.0 02/16/2025 1043    ALB 4.0 02/16/2025 1043

## 2025-02-18 NOTE — OCCUPATIONAL THERAPY NOTE
Occupational Therapy Evaluation     Patient Name: Michael Wilkinson  Today's Date: 2/18/2025  Problem List  Active Problems:    Fracture of multiple ribs of right side    Traumatic pneumothorax    Other specified hypothyroidism    Hypertension    Past Medical History  Past Medical History:   Diagnosis Date    Disease of thyroid gland     Diverticulitis     Hypertension      Past Surgical History  Past Surgical History:   Procedure Laterality Date    ABDOMINAL ADHESION SURGERY N/A 10/12/2020    Procedure: LYSIS ADHESIONS extensive;  Surgeon: Silverio Barros MD;  Location: [facility] MAIN OR;  Service: General    COLON SURGERY      COLONOSCOPY  02/22/2021    COLOSTOMY  11/02/2020    COLOSTOMY      COLOSTOMY TAKEDOWN/REVERSE MARQUEZ N/A 05/18/2021    Procedure: COLOSTOMY TAKEDOWN / REVERSE MARQUEZ;  Surgeon: Silverio Barros MD;  Location: [facility];  Service: General    HEMICOLOECTOMY W/ ANASTOMOSIS N/A 05/18/2021    Procedure: RESECTION COLON RIGHT OPEN, RIGHT TAP BLOCK;  Surgeon: Silverio Barros MD;  Location: [facility];  Service: General    IR DRAINAGE TUBE CHECK AND/OR REMOVAL  11/13/2020    IR DRAINAGE TUBE PLACEMENT  10/23/2020    IR DRAINAGE TUBE PLACEMENT  11/27/2020    IR PICC PLACEMENT DOUBLE LUMEN  10/16/2020    PA LAPS ABD PRTM&OMENTUM DX W/WO SPEC BR/WA SPX N/A 10/12/2020    Procedure: LAPAROSCOPY DIAGNOSTIC, drainage of pelvic abcess, placement drain;  Surgeon: Silverio Barros MD;  Location: [facility];  Service: General    PA LAPS ABD PRTM&OMENTUM DX W/WO SPEC BR/WA SPX N/A 11/02/2020    Procedure: LAPAROSCOPY DIAGNOSTIC, colostomy, repair of enterotomy;  Surgeon: Silverio Barros MD;  Location: [facility];  Service: General    PA LAPS ABD PRTM&OMENTUM DX W/WO SPEC BR/WA SPX N/A 03/17/2021    Procedure: LAPAROSCOPY DIAGNOSTIC;  Surgeon: Silverio Barros MD;  Location: [facility];  Service: General    PA LAPS CLSR NTRSTM LG/SM INT W/RESCJ & ANASTOMOSIS N/A 03/17/2021    Procedure: RESECTION COLON  "SIGMOID LAPAROSCOPIC, EXTENSIVE LYSIS OF ADHESIONS, TAKTDON OF SPLENIC FELXURE, LOW ANTERIOR RESECTION WITH COLORECTAL ANASTOMOSIS, INTEROPERATIVE COLONOSCOPY, OPEN REPAIR OF INCISIONAL HERNIA;  Surgeon: Silverio Barros MD;  Location:  MAIN OR;  Service: General        02/18/25 0829   OT Last Visit   OT Visit Date 02/18/25   Note Type   Note type Evaluation   Pain Assessment   Pain Assessment Tool 0-10   Pain Score 5   Pain Location/Orientation Orientation: Right;Location: Rib Cage   Patient's Stated Pain Goal No pain   Hospital Pain Intervention(s) Repositioned;Ambulation/increased activity   Restrictions/Precautions   Weight Bearing Precautions Per Order No   Other Precautions Bed Alarm;Chair Alarm;Multiple lines;Telemetry;Fall Risk;Pain  (R CT to suction, epidural)   Home Living   Type of Home House   Home Layout Two level;Bed/bath upstairs;Stairs to enter with rails  (1STE)   Bathroom Shower/Tub Walk-in shower   Bathroom Toilet Standard   Bathroom Equipment   (denies)   Home Equipment   (denies)   Additional Comments Pt reports living w/ spouse in 2SH w/ 1STE. Bed/bath on 2nd floor. Pt denies use of AD/DME PTA.   Prior Function   Level of [place] Independent with ADLs;Independent with functional mobility;Independent with IADLS   Lives With Spouse   Receives Help From Family   IADLs Independent with driving;Independent with meal prep;Independent with medication management   Falls in the last 6 months 1 to 4  (1 leading to this admission)   Vocational Full time employment   Comments Pt reports being completely independent PTA. (+) . FTE as wildlife ranger. Supportive spouse works though is able to assist when home.   Lifestyle   Autonomy PTA, Pt reports I w/ ADL, IADL, functional mobility (+)    Reciprocal Relationships Spouse   Service to Others FTE wildlife ranger   Intrinsic Gratification Active PTA   General   Family/Caregiver Present No   Subjective   Subjective \"It doesn't hurt until I " "move\"   ADL   Eating Assistance 6  Modified independent   Grooming Assistance 6  Modified Independent   UB Bathing Assistance 6  Modified Independent   LB Bathing Assistance 6  Modified Independent   UB Dressing Assistance 6  Modified independent   LB Dressing Assistance 6  Modified independent   Toileting Assistance  6  Modified independent   Functional Assistance 6  Modified independent   Bed Mobility   Supine to Sit 5  Supervision   Additional items Increased time required;Verbal cues   Sit to Supine Unable to assess   Additional Comments Pt greeted supine and left OOB in chair w/ alarm on and all needs within reach   Transfers   Sit to Stand 5  Supervision   Additional items Increased time required;Impulsive   Stand to Sit 5  Supervision   Additional items Verbal cues   Additional Comments no AD   Functional Mobility   Functional Mobility 5  Supervision   Additional Comments Pt completes short household distance mobility w/ Supervision w/o AD. VC for pacing.   Balance   Static Sitting Good   Dynamic Sitting Good   Static Standing Fair +   Dynamic Standing Fair +   Ambulatory Fair   Activity Tolerance   Activity Tolerance Patient tolerated treatment well;Patient limited by pain   Medical Staff Made Aware PT Ronda, co-eval w/ PT due to medical complexity and multiple comorbidities   Nurse Made Aware RN cleared   RUE Assessment   RUE Assessment WFL   LUE Assessment   LUE Assessment WFL   Hand Function   Gross Motor Coordination Functional   Fine Motor Coordination Functional   Sensation   Light Touch No apparent deficits   Cognition   Overall Cognitive Status WFL   Arousal/Participation Alert;Responsive;Cooperative   Attention Within functional limits   Orientation Level Oriented X4   Memory Within functional limits   Following Commands Follows one step commands without difficulty   Comments Pt is pleasant and cooperative. VC for safety and line management as Pt is mildly impulsive   Assessment   Prognosis Good "   Assessment Pt is a 55 y.o. male seen for OT evaluation s/p admission to [facility] on 2/16/2025 due to multiple rib fractures following fall on ice.  Pt  has a past medical history of Disease of thyroid gland, Diverticulitis, and Hypertension. Pt with active OT evaluation/treatment and activity orders. Pt reports living w/ spouse in 2SH w/ 1STE, bed/bath upstairs.  PTA, Pt was I w/ ADL/IADL and functional mobility, was driving and was not using DME at baseline. Pt agreeable and willing to participate in OT evaluation. Pt was greeted supine and left OOB in chair w/ alarm activated and all needs within reach. During evaluation, pt is Mod I/Supervision overall.  Pt is likely at/close to functional baseline. Pt states no acute OT concerns. The patient's raw score on the AM-PAC Daily Activity Inpatient Short Form is 24. A raw score of greater than or equal to 19 suggests the patient may benefit from discharge to home. Please refer to the recommendation of the Occupational Therapist for safe discharge planning. No further acute OT needs identified at this time. Recommend continued active ADL participation and mobilization with hospital staff while in the hospital to increase pt’s endurance and strength upon D/C. From OT standpoint, recommend D/C to home with family support when medically cleared. D/C pt from OT caseload at this time.   Goals   Patient Goals none stated   Plan   OT Frequency Eval only  (d/c OT)   Discharge Recommendation   Rehab Resource Intensity Level, OT No post-acute rehabilitation needs   AM-PAC Daily Activity Inpatient   Lower Body Dressing 4   Bathing 4   Toileting 4   Upper Body Dressing 4   Grooming 4   Eating 4   Daily Activity Raw Score 24   Daily Activity Standardized Score (Calc for Raw Score >=11) 57.54   AM-PAC Applied Cognition Inpatient   Following a Speech/Presentation 4   Understanding Ordinary Conversation 4   Taking Medications 4   Remembering Where Things Are Placed or Put  Away 4   Remembering List of 4-5 Errands 4   Taking Care of Complicated Tasks 4   Applied Cognition Raw Score 24   Applied Cognition Standardized Score 62.21   End of Consult   Education Provided Yes   Patient Position at End of Consult Bedside chair;Bed/Chair alarm activated;All needs within reach   Nurse Communication Nurse aware of consult     CONCEPCION Venegas, OTR/L

## 2025-02-18 NOTE — ASSESSMENT & PLAN NOTE
Present on 2/16 CT chest in setting of multiple rib fractures  Repeat CXR this morning showing pneumo appears to have improved significantly, pending final read

## 2025-02-18 NOTE — ASSESSMENT & PLAN NOTE
Present on 2/16 CT chest in setting of multiple rib fractures  Re-evaluate with chest x-ray's at 6 pm today and 6 am tomorrow

## 2025-02-18 NOTE — PROGRESS NOTES
Progress Note - Trauma   Name: Michael Wilkinson 55 y.o. male I MRN: [id number]  Unit/Bed#: [facility] 829-01 I Date of Admission: 2/16/2025   Date of Service: 2/18/2025 I Hospital Day: 2    Assessment & Plan  Fracture of multiple ribs of right side  - Multiple right-sided rib fractures (5-10), present on admission.  - Continue rib fracture protocol.  - Continue to encourage incentive spirometer use and adequate pulmonary hygiene.    - Appreciate APS evaluation and recommendations.  - Continue multimodal analgesic regimen.  - Supplemental oxygen via nasal cannula as needed to maintain saturations greater than or equal to 94%.  - Repeat chest x-ray's at 6 pm today and 6 am tomorrow   - PT and OT evaluation and treatment as indicated.  - Outpatient follow-up in the trauma clinic for re-evaluation in approximately 2 weeks.  Traumatic pneumothorax  Present on 2/16 CT chest in setting of multiple rib fractures  Repeat CXR this morning showing pneumo appears to have improved significantly, pending final read   Other specified hypothyroidism  Continue home levothyroxine     Hypertension, unspecified type  Continue home losartan, amlodipine, and HCTZ     Hyperlipidemia, unspecified hyperlipidemia type  Continue home atorvastatin     Alcohol abuse  Patient endorses daily alcohol consumption, approx. 4 beers daily   Last drink evening of 2/15  Was anxious, tachycardic, and tremulous at [facility] and given ativan  Asymptomatic upon arrival to [facility], though persistently tachycardic  CIWA protocol ordered    Sinus tachycardia  HR in 120's upon arrival  Per chart review, patient has h/o of sinus tachycardia  EKG 2/16- NSR with rate of 127  Continue to monitor heart rate while inpatient      Bowel Regimen: Senokot  VTE Prophylaxis:Heparin     Disposition: Inpatient management Please contact the SecureChat role for the Trauma service with any questions/concerns.    24 Hour Events : PA lateral chest x-ray showing increased pneumothorax, R CT  28Fr placed at bedside.  Tachycardic to 126 however improved down to 88, not vital signs stable on room air.    Subjective : Patient reports no issues.  Patient reports he slept well.  No nausea vomiting fevers chills chest pain or shortness of breath.  Patient able to pull 2500 cc on incentive spirometry.    Objective :  Temp:  [98 °F (36.7 °C)-99.7 °F (37.6 °C)] 98.2 °F (36.8 °C)  HR:  [] 88  BP: (108-146)/(75-92) 117/75  Resp:  [18-20] 18  SpO2:  [88 %-96 %] 95 %  O2 Device: None (Room air)    I/O         02/16 0701  02/17 0700 02/17 0701 02/18 0700    I.V.  94.8    Total Intake  94.8    Net  +94.8                Lines/Drains/Airways       Active Status       Name Placement date Placement time Site Days    Epidural Catheter 02/17/25 02/17/25  1400  -- less than 1    Chest Tube Right 02/17/25  1910  --  less than 1                  Physical Exam  Vitals and nursing note reviewed.   Constitutional:       General: He is not in acute distress.  HENT:      Head: Normocephalic and atraumatic.      Right Ear: External ear normal.      Left Ear: External ear normal.   Eyes:      Conjunctiva/sclera: Conjunctivae normal.   Cardiovascular:      Rate and Rhythm: Normal rate.   Pulmonary:      Effort: Pulmonary effort is normal. No respiratory distress.   Chest:      Comments: R CT 28Fr, no tenderness, no crepitus, dressing clean dry intact  Abdominal:      General: There is no distension.      Palpations: Abdomen is soft.      Tenderness: There is no abdominal tenderness. There is no guarding or rebound.   Neurological:      Mental Status: He is alert.        PIC Score  PIC Pain Score: 2 (2/18/2025  5:15 AM)  PIC Incentive Spirometry Score: 4 (2/18/2025  4:00 AM)  PIC Cough Description: 3 (2/18/2025  4:00 AM)  PIC Total Score: 10 (2/18/2025  4:00 AM)       If the Total PIC Score </=5, did you consult APS and evaluate patient for further intervention?: no      Pain:    Incentive Spirometry  Cough  3 = Controlled  4 =  Above goal volume 3 = Strong  2 = Moderate  3 = Goal to alert volume 2 = Weak  1 = Severe  2 = Below alert volume 1 = Absent     1 = Unable to perform IS           Lab Results: I have reviewed the following results:  Recent Labs     02/16/25  1043 02/17/25  0406   WBC 10.07 6.05   HGB 14.2 13.3   HCT 42.3 40.7    194   SODIUM 135  --    K 3.8  --      --    CO2 24  --    BUN 13  --    CREATININE 0.69  --    GLUC 126  --    AST 43*  --    ALT 37  --    ALB 4.0  --    TBILI 0.85  --    ALKPHOS 54  --    PTT 28 35*   INR 1.05 1.02       Imaging Results Review: No pertinent imaging studies reviewed.  Other Study Results Review: No additional pertinent studies reviewed.

## 2025-02-18 NOTE — PROCEDURES
Chest Tube Insertion    Date/Time: 2/17/2025 7:10 PM    Performed by: Tamara Katz MD  Authorized by: Tamara Katz MD    Patient location:  Bedside  Consent:     Consent obtained:  Verbal    Consent given by:  Patient  Universal protocol:     Patient identity confirmed:  Verbally with patient and arm band  Pre-procedure details:     Skin preparation:  ChloraPrep  Indications:     Indications: hemopneumothorax    Procedure details:     Placement location:  Lateral    Laterality:  Right    Approach:  Open    Scalpel size:  15    Tube size (Fr):  28    Dissection instrument:  Jadyn clamp    Tube connected to:  Suction    Drainage characteristics:  Bloody    Suture material:  2-0 silk    Dressing:  4x4 sterile gauze        Tamara Katz MD  2/17/2025 7:12 PM

## 2025-02-18 NOTE — CASE MANAGEMENT
Case Management Discharge Planning Note    Patient name Michael Belle [facility] 829/[facility] 829-01 MRN [id number]  : 1970 Date 2025       Current Admission Date: 2025  Current Admission Diagnosis:Fracture of multiple ribs of right side   Patient Active Problem List    Diagnosis Date Noted Date Diagnosed    Traumatic pneumothorax 2025     Other specified hypothyroidism 2025     Hypertension 2025     Fracture of multiple ribs of right side 2025     Intestinal stoma prolapse (HCC) 2021     Sinus tachycardia 2020     Hyponatremia 2020     Colonic diverticular abscess      Impaired nutrition 10/19/2020     Hyperlipidemia 10/12/2020     HTN (hypertension) 10/12/2020     Hypothyroidism 10/12/2020       LOS (days): 2  Geometric Mean LOS (GMLOS) (days):   Days to GMLOS:     OBJECTIVE:  Risk of Unplanned Readmission Score: 11.88         Current admission status: Inpatient   Preferred Pharmacy:   [id number] - [place] - [address] [place] [id number]  Phone: [phone] Fax: [phone]    [facility] - [place] - [address] [place] [address].  [facility] [address]  Phone: [phone] Fax: [phone]    Primary Care Provider: Noelle Santana DO    Primary Insurance: AET[employer]  Secondary Insurance:     DISCHARGE DETAILS:    Pt was evaluated by OT/PT and recommended for a home d/c when medically stable  Pt had chest tube placed yesterday  CM will follow

## 2025-02-18 NOTE — PLAN OF CARE
Problem: PHYSICAL THERAPY ADULT  Goal: Performs mobility at highest level of function for planned discharge setting.  See evaluation for individualized goals.  Description: Treatment/Interventions: Functional transfer training, Elevations, Therapeutic exercise, Gait training, Bed mobility, Spoke to nursing, Spoke to case management, OT          See flowsheet documentation for full assessment, interventions and recommendations.  Note: Prognosis: Good  Problem List: Decreased endurance, Pain  Assessment: Pt is a 55 y.o. male seen for PT evaluation s/p admit to [facility] on 2/16/2025. Pt was admitted with a primary dx of: Fracture of multiple ribs right side(5-10), Traumatic pneumothorax, HTN, Hypothyroidism.Pt has a past medical history of Disease of thyroid gland, Diverticulitis, and Hypertension.   PT now consulted for assessment of mobility and d/c needs. Pt with Ambulate orders.  Pts current clinical presentation is Evolving (medium complexity) due to Ongoing medical management for primary dx, Decreased activity tolerance compared to baseline, Fall risk, Trending lab values, Chest tube placement . Prior to admission, pt was Ind for mobilitiy and ADLs, lives with spouse in 2STH. Upon evaluation, pt currently is requiring supervision for bed mobility , transfers and ambulation, no AD used during session, somewhat impulsive requiring reminders for CT and epidural management. Pt approaching functional baseline, and will continue to benefit from further PT services while in hospital to maximize mobility, stair trial.  At conclusion of PT session chair alarm engaged, all needs in reach, RN notified of session findings/recommendations, and pt returned back in recliner chair with phone and call bell within reach. Pt denies any further questions at this time. The patient's AM-PAC Basic Mobility Inpatient Short Form Raw Score is 19. A Raw score of greater than 16 suggests the patient may benefit from discharge  to home. Please also refer to the recommendation of the Physical Therapist for safe discharge planning. Recommend No Post Acute Rehab services upon hospital D/C.  Barriers to Discharge: Other (Comment) (Stair trial prior to dc)     Rehab Resource Intensity Level, PT: No post-acute rehabilitation needs    See flowsheet documentation for full assessment.

## 2025-02-18 NOTE — PLAN OF CARE
Problem: PAIN - ADULT  Goal: Verbalizes/displays adequate comfort level or baseline comfort level  Description: Interventions:  - Encourage patient to monitor pain and request assistance  - Assess pain using appropriate pain scale  - Administer analgesics based on type and severity of pain and evaluate response  - Implement non-pharmacological measures as appropriate and evaluate response  - Consider cultural and social influences on pain and pain management  - Notify physician/advanced practitioner if interventions unsuccessful or patient reports new pain  Outcome: Progressing     Problem: INFECTION - ADULT  Goal: Absence or prevention of progression during hospitalization  Description: INTERVENTIONS:  - Assess and monitor for signs and symptoms of infection  - Monitor lab/diagnostic results  - Monitor all insertion sites, i.e. indwelling lines, tubes, and drains  - Monitor endotracheal if appropriate and nasal secretions for changes in amount and color  - Terry appropriate cooling/warming therapies per order  - Administer medications as ordered  - Instruct and encourage patient and family to use good hand hygiene technique  - Identify and instruct in appropriate isolation precautions for identified infection/condition  Outcome: Progressing  Goal: Absence of fever/infection during neutropenic period  Description: INTERVENTIONS:  - Monitor WBC    Outcome: Progressing     Problem: DISCHARGE PLANNING  Goal: Discharge to home or other facility with appropriate resources  Description: INTERVENTIONS:  - Identify barriers to discharge w/patient and caregiver  - Arrange for needed discharge resources and transportation as appropriate  - Identify discharge learning needs (meds, wound care, etc.)  - Arrange for interpretive services to assist at discharge as needed  - Refer to Case Management Department for coordinating discharge planning if the patient needs post-hospital services based on physician/advanced  practitioner order or complex needs related to functional status, cognitive ability, or social support system  Outcome: Progressing     Problem: Knowledge Deficit  Goal: Patient/family/caregiver demonstrates understanding of disease process, treatment plan, medications, and discharge instructions  Description: Complete learning assessment and assess knowledge base.  Interventions:  - Provide teaching at level of understanding  - Provide teaching via preferred learning methods  Outcome: Progressing

## 2025-02-18 NOTE — PHYSICAL THERAPY NOTE
Physical Therapy Evaluation     Patient's Name: Michael Wilkinson    Admitting Diagnosis  Sinus tachycardia [R00.0]  Alcohol abuse [F10.10]  Traumatic pneumothorax, initial encounter [S27.0XXA]  Other specified hypothyroidism [E03.8]  Closed fracture of multiple ribs of right side, initial encounter [S22.41XA]  Hyperlipidemia, unspecified hyperlipidemia type [E78.5]  Hypertension, unspecified type [I10]    Problem List  Patient Active Problem List   Diagnosis    Hyperlipidemia    HTN (hypertension)    Hypothyroidism    Impaired nutrition    Colonic diverticular abscess    Sinus tachycardia    Hyponatremia    Intestinal stoma prolapse (HCC)    Fracture of multiple ribs of right side    Traumatic pneumothorax    Other specified hypothyroidism    Hypertension       Past Medical History  Past Medical History:   Diagnosis Date    Disease of thyroid gland     Diverticulitis     Hypertension        Past Surgical History  Past Surgical History:   Procedure Laterality Date    ABDOMINAL ADHESION SURGERY N/A 10/12/2020    Procedure: LYSIS ADHESIONS extensive;  Surgeon: Silverio Barros MD;  Location: [facility];  Service: General    COLON SURGERY      COLONOSCOPY  02/22/2021    COLOSTOMY  11/02/2020    COLOSTOMY      COLOSTOMY TAKEDOWN/REVERSE MARQUEZ N/A 05/18/2021    Procedure: COLOSTOMY TAKEDOWN / REVERSE MARQUEZ;  Surgeon: Silverio Barros MD;  Location: [facility];  Service: General    HEMICOLOECTOMY W/ ANASTOMOSIS N/A 05/18/2021    Procedure: RESECTION COLON RIGHT OPEN, RIGHT TAP BLOCK;  Surgeon: Silverio Barros MD;  Location: [facility];  Service: General    IR DRAINAGE TUBE CHECK AND/OR REMOVAL  11/13/2020    IR DRAINAGE TUBE PLACEMENT  10/23/2020    IR DRAINAGE TUBE PLACEMENT  11/27/2020    IR PICC PLACEMENT DOUBLE LUMEN  10/16/2020    OH LAPS ABD PRTM&OMENTUM DX W/WO SPEC BR/WA SPX N/A 10/12/2020    Procedure: LAPAROSCOPY DIAGNOSTIC, drainage of pelvic abcess, placement drain;  Surgeon: Silverio Barros MD;   Location: [facility];  Service: General    KS LAPS ABD PRTM&OMENTUM DX W/WO SPEC BR/WA SPX N/A 11/02/2020    Procedure: LAPAROSCOPY DIAGNOSTIC, colostomy, repair of enterotomy;  Surgeon: Silverio Barros MD;  Location: [facility];  Service: General    KS LAPS ABD PRTM&OMENTUM DX W/WO SPEC BR/WA SPX N/A 03/17/2021    Procedure: LAPAROSCOPY DIAGNOSTIC;  Surgeon: Silverio Barros MD;  Location: [facility];  Service: General    KS LAPS CLSR NTRSTM LG/SM INT W/RESCJ & ANASTOMOSIS N/A 03/17/2021    Procedure: RESECTION COLON SIGMOID LAPAROSCOPIC, EXTENSIVE LYSIS OF ADHESIONS, TAKTDON OF SPLENIC FELXURE, LOW ANTERIOR RESECTION WITH COLORECTAL ANASTOMOSIS, INTEROPERATIVE COLONOSCOPY, OPEN REPAIR OF INCISIONAL HERNIA;  Surgeon: Silverio Barros MD;  Location: [facility];  Service: General          02/18/25 0828   PT Last Visit   PT Visit Date 02/18/25   Note Type   Note type Evaluation   Pain Assessment   Pain Assessment Tool 0-10   Pain Score 5   Pain Location/Orientation Orientation: Right;Location: Rib Cage   Pain Onset/Description Onset: Ongoing   Effect of Pain on Daily Activities Pain with activity   Patient's Stated Pain Goal No pain   Hospital Pain Intervention(s) Repositioned;Ambulation/increased activity   Restrictions/Precautions   Weight Bearing Precautions Per Order No   Other Precautions Chair Alarm;Bed Alarm;Multiple lines;Fall Risk;Pain  (R chest tube to suction(able to be off for ambulation per RN), Epidural)   Home Living   Type of Home House   Home Layout Two level;Bed/bath upstairs;Stairs to enter with rails  (1STE)   Bathroom Shower/Tub Walk-in shower   Bathroom Toilet Standard   Home Equipment Other (Comment)  (No DME)   Additional Comments 2STH with 1STE.FFOS to bed/bath   Prior Function   Level of [place] Independent with ADLs;Independent with functional mobility;Independent with IADLS   Lives With Spouse   Receives Help From Family   IADLs Independent with driving;Independent with meal  "prep;Independent with medication management   Falls in the last 6 months 1 to 4  (1 leading to admission)   Vocational Full time employment   Comments Pt is Ind  for mobility and ADLs, no AD used   General   Family/Caregiver Present No   Cognition   Overall Cognitive Status WFL   Arousal/Participation Alert   Orientation Level Oriented X4   Following Commands Follows one step commands without difficulty   Comments Pt cooperative and very pleasant, somewhat impulsive, needing VCs for line management   Subjective   Subjective ' I am good with getting up\"   RLE Assessment   RLE Assessment WFL   LLE Assessment   LLE Assessment WFL   Bed Mobility   Supine to Sit 5  Supervision   Additional items HOB elevated;Increased time required;Verbal cues   Sit to Supine Unable to assess   Additional Comments Pt in bed upon arrival.   Transfers   Sit to Stand 5  Supervision   Additional items Verbal cues;Impulsive   Stand to Sit 5  Supervision   Additional items Armrests;Verbal cues   Additional Comments no AD used   Ambulation/Elevation   Gait pattern WNL   Gait Assistance 5  Supervision   Additional items Verbal cues   Assistive Device None   Distance 100 ft   Stair Management Assistance Not tested   Ambulation/Elevation Additional Comments Ambulation completed in hallway without AD, VCs through session for reminders for CT and Epidural line mangement   Balance   Static Sitting Good   Dynamic Sitting Good   Static Standing Fair +   Dynamic Standing Fair +   Ambulatory Fair   Activity Tolerance   Activity Tolerance Patient tolerated treatment well;Patient limited by pain   Medical Staff Made Aware Co-eval with OT 2*medical complexity and multiple co morbidities   Nurse Made Aware RN cleared pt for therapy   Assessment   Prognosis Good   Problem List Decreased endurance;Pain   Assessment Pt is a 55 y.o. male seen for PT evaluation s/p admit to [facility] on 2/16/2025. Pt was admitted with a primary dx of: Fracture of " multiple ribs right side(5-10), Traumatic pneumothorax, HTN, Hypothyroidism.Pt has a past medical history of Disease of thyroid gland, Diverticulitis, and Hypertension.   PT now consulted for assessment of mobility and d/c needs. Pt with Ambulate orders.  Pts current clinical presentation is Evolving (medium complexity) due to Ongoing medical management for primary dx, Decreased activity tolerance compared to baseline, Fall risk, Trending lab values, Chest tube placement . Prior to admission, pt was Ind for mobilitiy and ADLs, lives with spouse in [facility]. Upon evaluation, pt currently is requiring supervision for bed mobility , transfers and ambulation, no AD used during session, somewhat impulsive requiring reminders for CT and epidural management. Pt approaching functional baseline, and will continue to benefit from further PT services while in hospital to maximize mobility, stair trial.  At conclusion of PT session chair alarm engaged, all needs in reach, RN notified of session findings/recommendations, and pt returned back in recliner chair with phone and call bell within reach. Pt denies any further questions at this time. The patient's AM-PAC Basic Mobility Inpatient Short Form Raw Score is 19. A Raw score of greater than 16 suggests the patient may benefit from discharge to home. Please also refer to the recommendation of the Physical Therapist for safe discharge planning. Recommend No Post Acute Rehab services upon hospital D/C.   Barriers to Discharge Other (Comment)  (Stair trial prior to dc)   Goals   Patient Goals none stated   STG Expiration Date 03/04/25   Short Term Goal #1 STG 1. Pt will be able to perform bed mobility tasks with Ind in order to improve overall functional mobility and assist in safe d/c. STG 2. Pt with sit EOB for at least 25 minutes at Ind level in order to strengthen abdominal musculature and assist in future transfers/ ambulation. STG 3. Pt will be able to perform functional  transfer with Ind in order to improve overall functional mobility and assist in safe d/c. STG 4. Pt will be able to ambulate at least 300 feet with least restrictive device with Sup A in order to improve overall functional mobility and assist in safe d/c. STG 5. Pt will be able to negotiate at least 12 stairs with least restrictive device with sup A in order to improve overall functional mobility and assist in safe d/c.   PT Treatment Day 0   Plan   Treatment/Interventions Functional transfer training;Elevations;Therapeutic exercise;Gait training;Bed mobility;Spoke to nursing;Spoke to case management;OT   PT Frequency 2-3x/wk   Discharge Recommendation   Rehab Resource Intensity Level, PT No post-acute rehabilitation needs   AM-PAC Basic Mobility Inpatient   Turning in Flat Bed Without Bedrails 4   Lying on Back to Sitting on Edge of Flat Bed Without Bedrails 3   Moving Bed to Chair 3   Standing Up From Chair Using Arms 3   Walk in Room 3   Climb 3-5 Stairs With Railing 3   Basic Mobility Inpatient Raw Score 19   Basic Mobility Standardized Score 42.48   [facility] Highest Level Of Mobility   -HLM Goal 6: Walk 10 steps or more   -HLM Achieved 7: Walk 25 feet or more   Modified [place] Scale   Modified [place] Scale 2   End of Consult   Patient Position at End of Consult All needs within reach;Bed/Chair alarm activated;Bedside chair         Ronda Fritz, PT DPT

## 2025-02-19 ENCOUNTER — APPOINTMENT (INPATIENT)
Dept: RADIOLOGY | Facility: HOSPITAL | Age: 55
DRG: 183 | End: 2025-02-19
Payer: COMMERCIAL

## 2025-02-19 LAB
ANION GAP SERPL CALCULATED.3IONS-SCNC: 10 MMOL/L (ref 4–13)
BUN SERPL-MCNC: 11 MG/DL (ref 5–25)
CALCIUM SERPL-MCNC: 8.5 MG/DL (ref 8.4–10.2)
CHLORIDE SERPL-SCNC: 100 MMOL/L (ref 96–108)
CO2 SERPL-SCNC: 24 MMOL/L (ref 21–32)
CREAT SERPL-MCNC: 0.68 MG/DL (ref 0.6–1.3)
ERYTHROCYTE [DISTWIDTH] IN BLOOD BY AUTOMATED COUNT: 11.7 % (ref 11.6–15.1)
GFR SERPL CREATININE-BSD FRML MDRD: 106 ML/MIN/1.73SQ M
GLUCOSE SERPL-MCNC: 102 MG/DL (ref 65–140)
HCT VFR BLD AUTO: 39 % (ref 36.5–49.3)
HGB BLD-MCNC: 13.5 G/DL (ref 12–17)
MCH RBC QN AUTO: 34.3 PG (ref 26.8–34.3)
MCHC RBC AUTO-ENTMCNC: 34.6 G/DL (ref 31.4–37.4)
MCV RBC AUTO: 99 FL (ref 82–98)
PLATELET # BLD AUTO: 189 THOUSANDS/UL (ref 149–390)
PMV BLD AUTO: 8.9 FL (ref 8.9–12.7)
POTASSIUM SERPL-SCNC: 3 MMOL/L (ref 3.5–5.3)
RBC # BLD AUTO: 3.94 MILLION/UL (ref 3.88–5.62)
SODIUM SERPL-SCNC: 134 MMOL/L (ref 135–147)
WBC # BLD AUTO: 7.63 THOUSAND/UL (ref 4.31–10.16)

## 2025-02-19 PROCEDURE — 85027 COMPLETE CBC AUTOMATED: CPT

## 2025-02-19 PROCEDURE — 99233 SBSQ HOSP IP/OBS HIGH 50: CPT | Performed by: ANESTHESIOLOGY

## 2025-02-19 PROCEDURE — 99232 SBSQ HOSP IP/OBS MODERATE 35: CPT | Performed by: STUDENT IN AN ORGANIZED HEALTH CARE EDUCATION/TRAINING PROGRAM

## 2025-02-19 PROCEDURE — 80048 BASIC METABOLIC PNL TOTAL CA: CPT

## 2025-02-19 PROCEDURE — 71045 X-RAY EXAM CHEST 1 VIEW: CPT

## 2025-02-19 RX ADMIN — ACETAMINOPHEN 975 MG: 325 TABLET, FILM COATED ORAL at 06:19

## 2025-02-19 RX ADMIN — LOSARTAN POTASSIUM 100 MG: 50 TABLET, FILM COATED ORAL at 09:27

## 2025-02-19 RX ADMIN — ACETAMINOPHEN 975 MG: 325 TABLET, FILM COATED ORAL at 21:27

## 2025-02-19 RX ADMIN — SENNOSIDES AND DOCUSATE SODIUM 1 TABLET: 50; 8.6 TABLET ORAL at 21:27

## 2025-02-19 RX ADMIN — GABAPENTIN 100 MG: 100 CAPSULE ORAL at 09:27

## 2025-02-19 RX ADMIN — ACETAMINOPHEN 975 MG: 325 TABLET, FILM COATED ORAL at 16:05

## 2025-02-19 RX ADMIN — METHOCARBAMOL 750 MG: 750 TABLET ORAL at 17:48

## 2025-02-19 RX ADMIN — GABAPENTIN 100 MG: 100 CAPSULE ORAL at 21:28

## 2025-02-19 RX ADMIN — AMLODIPINE BESYLATE 10 MG: 10 TABLET ORAL at 09:27

## 2025-02-19 RX ADMIN — Medication 9 MG: at 21:27

## 2025-02-19 RX ADMIN — ROPIVACAINE HYDROCHLORIDE: 5 INJECTION EPIDURAL; INFILTRATION; PERINEURAL at 10:35

## 2025-02-19 RX ADMIN — LEVOTHYROXINE SODIUM 75 MCG: 0.07 TABLET ORAL at 09:27

## 2025-02-19 RX ADMIN — HEPARIN SODIUM 5000 UNITS: 5000 INJECTION INTRAVENOUS; SUBCUTANEOUS at 21:27

## 2025-02-19 RX ADMIN — METHOCARBAMOL 750 MG: 750 TABLET ORAL at 06:19

## 2025-02-19 RX ADMIN — METHOCARBAMOL 750 MG: 750 TABLET ORAL at 23:46

## 2025-02-19 RX ADMIN — METHOCARBAMOL 750 MG: 750 TABLET ORAL at 12:23

## 2025-02-19 RX ADMIN — GABAPENTIN 100 MG: 100 CAPSULE ORAL at 17:48

## 2025-02-19 RX ADMIN — ATORVASTATIN CALCIUM 10 MG: 10 TABLET, FILM COATED ORAL at 17:48

## 2025-02-19 RX ADMIN — HEPARIN SODIUM 5000 UNITS: 5000 INJECTION INTRAVENOUS; SUBCUTANEOUS at 16:11

## 2025-02-19 RX ADMIN — HYDROCHLOROTHIAZIDE 25 MG: 25 TABLET ORAL at 09:27

## 2025-02-19 RX ADMIN — HEPARIN SODIUM 5000 UNITS: 5000 INJECTION INTRAVENOUS; SUBCUTANEOUS at 06:19

## 2025-02-19 NOTE — DISCHARGE INSTR - OTHER ORDERS
Shaylee Dang  Certified     [facility]  [phone]  M-F 8am-4:30pm

## 2025-02-19 NOTE — PROGRESS NOTES
Progress Note - Trauma   Name: Michael Wilkinson 55 y.o. male I MRN: [id number]  [facility] Date of Admission: 2/16/2025   Date of Service: 2/19/2025 I Hospital Day: 3    Assessment & Plan  Fracture of multiple ribs of right side  - Multiple right-sided rib fractures (5-10), present on admission.  - Continue rib fracture protocol.  - Continue to encourage incentive spirometer use and adequate pulmonary hygiene.    - Appreciate APS evaluation and recommendations.  - Continue multimodal analgesic regimen.  - Supplemental oxygen via nasal cannula as needed to maintain saturations greater than or equal to 94%.  - Repeat chest x-ray's at 6 pm today and 6 am tomorrow   - PT and OT evaluation and treatment as indicated.  - Outpatient follow-up in the trauma clinic for re-evaluation in approximately 2 weeks.  Traumatic pneumothorax  Present on 2/16 CT chest in setting of multiple rib fractures  Repeat CXR this morning showing pneumo appears to have improved significantly,   Chest tube to waterseal repeat x-ray in 4 hours.  Other specified hypothyroidism  Continue home levothyroxine   Hypertension      Bowel Regimen: Senna  VTE Prophylaxis:VTE covered by:  heparin (porcine), Subcutaneous, 5,000 Units at 02/19/25 1611         Disposition: Pending pain control and chest tube removal     24 Hour Events : No acute events overnight  Subjective : Patient states he is feeling better.  States that he has some pain related to the chest tube.  He says otherwise has been feeling okay pain is controlled with medications.    Objective :  Temp:  [97.5 °F (36.4 °C)-98.5 °F (36.9 °C)] 98.3 °F (36.8 °C)  HR:  [] 97  BP: (117-123)/(78-87) 123/87  Resp:  [16-20] 20  SpO2:  [93 %-99 %] 93 %  O2 Device: None (Room air)    I/O         02/17 0701 02/18 0700 02/18 0701 02/19 0700 02/19 0701 02/20 0700    P.O.  236     I.V. 117.5 278.4     Total Intake 117.5 514.4     Urine 0 1450     Stool 0 0     Chest Tube 140 110     Total  Output 140 1560     Net -22.5 -1045.6            Unmeasured Urine Occurrence 2 x      Unmeasured Stool Occurrence 1 x 0 x           Lines/Drains/Airways       Active Status       Name Placement date Placement time Site Days    Epidural Catheter 02/17/25 02/17/25  1400  -- 2    Chest Tube Right 02/17/25  1910  --  1                  Physical Exam  Vitals and nursing note reviewed.   Constitutional:       Appearance: Normal appearance. He is well-developed.   HENT:      Head: Normocephalic and atraumatic.      Nose: Nose normal.      Mouth/Throat:      Mouth: Mucous membranes are moist.      Pharynx: No oropharyngeal exudate or posterior oropharyngeal erythema.   Eyes:      Extraocular Movements: Extraocular movements intact.      Conjunctiva/sclera: Conjunctivae normal.      Pupils: Pupils are equal, round, and reactive to light.   Cardiovascular:      Rate and Rhythm: Normal rate and regular rhythm.      Pulses: Normal pulses.      Heart sounds: Normal heart sounds.   Pulmonary:      Effort: Pulmonary effort is normal.      Breath sounds: Normal breath sounds.   Chest:      Chest wall: Tenderness (Mild right-sided chest tenderness with clean dry and intact chest tube adhered to chest wall.) present.   Abdominal:      General: Bowel sounds are normal. There is no distension.      Palpations: Abdomen is soft.      Tenderness: There is no abdominal tenderness. There is no guarding or rebound.   Musculoskeletal:         General: Normal range of motion.      Cervical back: Normal range of motion and neck supple.      Right lower leg: No edema.      Left lower leg: No edema.   Skin:     General: Skin is warm and dry.      Capillary Refill: Capillary refill takes less than 2 seconds.   Neurological:      General: No focal deficit present.      Mental Status: He is alert and oriented to person, place, and time.      Cranial Nerves: No cranial nerve deficit.   Psychiatric:         Mood and Affect: Mood normal.          Behavior: Behavior normal.        PIC Score  PIC Pain Score: 3 (2/19/2025  2:00 PM)  PIC Incentive Spirometry Score: 4 (2/19/2025  2:00 PM)  PIC Cough Description: 3 (2/19/2025  2:00 PM)  PIC Total Score: 10 (2/19/2025  2:00 PM)       If the Total PIC Score </=5, did you consult APS and evaluate patient for further intervention?: no      Pain:    Incentive Spirometry  Cough  3 = Controlled  4 = Above goal volume 3 = Strong  2 = Moderate  3 = Goal to alert volume 2 = Weak  1 = Severe  2 = Below alert volume 1 = Absent     1 = Unable to perform IS           Lab Results: I have reviewed the following results:  Recent Labs     02/17/25  0406 02/17/25  0406 02/18/25  0525 02/19/25  0603   WBC 6.05  --  7.71 7.63   HGB 13.3  --  13.2 13.5   HCT 40.7  --  39.3 39.0     --  174 189   SODIUM  --    < > 133* 134*   K  --    < > 3.1* 3.0*   CL  --    < > 97 100   CO2  --    < > 26 24   BUN  --    < > 11 11   CREATININE  --    < > 0.64 0.68   GLUC  --    < > 120 102   MG  --   --  1.9  --    PHOS  --   --  2.7  --    PTT 35*  --   --   --    INR 1.02  --   --   --     < > = values in this interval not displayed.

## 2025-02-19 NOTE — CASE MANAGEMENT
Case Management Discharge Planning Note    Patient name Michael Belle [facility] 829/[facility] 829-01 MRN [id number]  : 1970 Date 2025       Current Admission Date: 2025  Current Admission Diagnosis:Fracture of multiple ribs of right side   Patient Active Problem List    Diagnosis Date Noted Date Diagnosed    Traumatic pneumothorax 2025     Other specified hypothyroidism 2025     Hypertension 2025     Fracture of multiple ribs of right side 2025     Intestinal stoma prolapse (HCC) 2021     Sinus tachycardia 2020     Hyponatremia 2020     Colonic diverticular abscess      Impaired nutrition 10/19/2020     Hyperlipidemia 10/12/2020     HTN (hypertension) 10/12/2020     Hypothyroidism 10/12/2020       LOS (days): 3  Geometric Mean LOS (GMLOS) (days):   Days to GMLOS:     OBJECTIVE:  Risk of Unplanned Readmission Score: 11.9         Current admission status: Inpatient   Preferred Pharmacy:   [id number] - [place] - [address] [place] [id number]  Phone: [phone] Fax: [phone]    [facility] [place] - [address] [place] [address].  [facility] [address]  Phone: [phone] Fax: [phone]    Primary Care Provider: Noelle Santana DO    Primary Insurance: AET[employer]  Secondary Insurance:     DISCHARGE DETAILS:    Pt's chest tube and epidural remain in place  Plan for d/c in roughly 24-48 hours. Pt will d/c home when stable

## 2025-02-19 NOTE — CERTIFIED RECOVERY SPECIALIST
"  Time spent: 16 minutes    Consult Rec'd: Yes  Patient seen in:IP  Stage of change: Pre contemplation    Substance used: Beer  Frequency/Quantity: daily / 4   Date of last use: 2/15/25    Purpose: to connect with patient and explain CRS services. Offer support and provide resources and options for AUD treatment, recovery conversation/education and connection to community.    CRS met with patient, explained services for CRS role, offered support if needed, and inquired about pts alcohol use. Patient was forthcoming and shared that he has met with a CRS in past at [facility], and drinks about 4 beers a day but hasn't considered it to be a problem. CRS provided some AUD education about this being progressive disease (if we have it) and encouraged awareness about any withdrawal symptoms as an indicator, when stopping abruptly. Patient did say he \"had tremors for a couple days but they have now gone away.\" Patient was receptive to the conversation and said he doesn't need resources at this time but will give it thought and reach out if he decides he does.     Plan: CRS provided contact information and remains available if needed for support.                         "

## 2025-02-19 NOTE — UTILIZATION REVIEW
Continued Stay Review    Date: 2/18/25 & 2/19/25                  Current Patient Class: Inpatient  Current Level of Care: MED-SURG    HPI:55 y.o. male initially admitted on 12/16.   Current Diagnosis:Mechanical fall resulting in Rt. 5-10 rib fractures     Assessment/Plan: slept well. No nausea vomiting fevers chills chest pain or shortness of breath. Patient able to pull 2500 cc on incentive spirometry.Able to ambulate and max out IS. On room air with good O2 sats. .. K3.Reports excellent pain control with the epidural. Has some mild discomfort at anterior chest near chest tube. Tolerating diet. We discussed eventual removal of epidural but I think OK to keep in as long as chest tube remains. Anticipate epidural for another 24-48 hrs.  Analgesics. Acute Pain Service consulted.    Medications:   Scheduled Medications:  acetaminophen, 975 mg, Oral, Q8H LEO  losartan, 100 mg, Oral, Daily   And  amLODIPine, 10 mg, Oral, Daily   And  hydroCHLOROthiazide, 25 mg, Oral, Daily  atorvastatin, 10 mg, Oral, Daily With Dinner  gabapentin, 100 mg, Oral, TID  heparin (porcine), 5,000 Units, Subcutaneous, Q8H LEO  levothyroxine, 75 mcg, Oral, Daily  lidocaine, 2 patch, Topical, Daily  melatonin, 9 mg, Oral, HS  methocarbamol, 750 mg, Oral, Q6H LEO  senna-docusate sodium, 1 tablet, Oral, HS      Continuous IV Infusions:  ropivacaine 0.1% and fentaNYL 2 mcg/mL, , Epidural, Continuous      PRN Meds:  HYDROmorphone, 0.5 mg, Intravenous, Q3H PRN  naloxone, 0.04 mg, Intravenous, Q1MIN PRN  oxyCODONE, 10 mg, Oral, Q4H PRN  oxyCODONE, 5 mg, Oral, Q4H PRN      Discharge Plan: TBD    Vital Signs (last 3 days)       Date/Time Temp Pulse Resp BP MAP (mmHg) SpO2 Calculated FIO2 (%) - Nasal Cannula Nasal Cannula O2 Flow Rate (L/min) O2 Device Patient Position - Orthostatic VS Cedric Coma Scale Score CIWA-Ar Total Pain    02/19/25 10:54:36 98.5 °F (36.9 °C) 90 16 120/78 92 94 % -- -- -- -- -- -- --    02/19/25 0800 -- -- -- -- -- --  -- -- None (Room air) -- 15 -- No Pain    02/19/25 07:22:44 97.5 °F (36.4 °C) 95 16 121/80 94 99 % -- -- -- -- -- -- --    02/19/25 0700 -- -- -- -- -- -- -- -- -- -- -- -- No Pain    02/19/25 0645 -- -- -- -- -- -- -- -- -- -- -- -- No Pain    02/19/25 0619 -- -- -- -- -- -- -- -- -- -- -- -- No Pain    02/19/25 0600 -- -- -- -- -- -- -- -- -- -- -- 1 --    02/19/25 0300 -- -- -- -- -- -- -- -- -- -- -- 1 No Pain    02/18/25 23:01:04 98.4 °F (36.9 °C) 97 16 118/81 93 95 % -- -- -- -- -- -- --    02/18/25 2300 -- -- -- -- -- -- -- -- -- -- 15 3 --    02/18/25 2258 -- -- -- -- -- -- -- -- -- -- -- -- No Pain    02/18/25 2200 -- -- -- -- -- -- -- -- -- -- -- -- No Pain    02/18/25 1920 -- -- -- -- -- -- -- -- None (Room air) -- 15 -- No Pain    02/18/25 1900 -- -- -- -- -- -- -- -- -- -- -- -- 5    02/18/25 18:40:23 98.1 °F (36.7 °C) 103 17 117/80 92 94 % -- -- -- -- -- -- --    02/18/25 1554 -- -- -- -- -- -- -- -- -- -- -- -- 4    02/18/25 1500 -- -- -- -- -- -- -- -- None (Room air) -- -- 2 --    02/18/25 1438 -- -- -- -- -- -- -- -- -- -- -- -- 6    02/18/25 1430 -- 104 -- 112/76 88 -- -- -- -- -- -- -- --    02/18/25 1400 -- 107 -- 115/75 88 -- -- -- -- -- -- -- --    02/18/25 1330 -- 105 -- 116/78 91 -- -- -- -- -- -- -- --    02/18/25 1245 -- 104 -- 120/79 93 -- -- -- -- -- -- -- --    02/18/25 1230 -- 97 -- 121/77 92 -- -- -- -- -- -- -- --    02/18/25 1215 -- 103 -- 124/78 93 -- -- -- -- -- -- -- --    02/18/25 1200 -- 97 -- 125/78 94 -- -- -- -- -- -- -- --    02/18/25 1145 -- 103 -- 130/90 103 -- -- -- -- -- -- -- --    02/18/25 1130 -- 88 -- 132/83 99 -- -- -- -- -- -- -- --    02/18/25 1115 -- 94 -- 134/84 101 -- -- -- -- -- -- -- --    02/18/25 1100 -- -- -- -- -- -- -- -- None (Room air) -- -- 3 4    02/18/25 0829 -- -- -- -- -- -- -- -- -- -- -- -- 5 02/18/25 0828 -- -- -- -- -- -- -- -- -- -- -- -- 5 02/18/25 08:25:21 97.9 °F (36.6 °C) 102 16 131/76 94 95 % -- -- -- -- -- -- --    02/18/25 0815  -- -- -- -- -- -- -- -- None (Room air) -- 15 -- 4 02/18/25 0700 -- -- -- -- -- -- -- -- None (Room air) -- -- 3 4    02/18/25 0515 -- -- -- -- -- -- -- -- -- -- -- -- 5 02/18/25 0400 -- -- -- -- -- -- -- -- -- -- -- -- 2 02/18/25 0300 -- -- -- 117/75 -- -- -- -- None (Room air) -- -- 2 5 02/18/25 02:25:08 98.2 °F (36.8 °C) 88 18 117/75 89 95 % -- -- -- -- -- -- --    02/18/25 0000 -- -- -- -- -- -- -- -- -- -- -- -- 3    02/17/25 2300 -- -- -- 115/76 -- -- -- -- -- -- -- 2 5 02/17/25 21:22:54 98.4 °F (36.9 °C) 100 18 115/76 89 93 % -- -- -- -- -- -- --    02/17/25 2115 -- -- -- -- -- -- -- -- -- -- -- -- 5 02/17/25 2030 -- -- -- -- -- -- -- -- -- -- 15 -- -- 02/17/25 2000 -- -- -- -- -- -- -- -- -- -- -- -- 5 02/17/25 1945 98 °F (36.7 °C) 109 -- 116/76 89 88 % -- -- -- -- -- -- 5 02/17/25 1900 -- -- -- -- -- -- -- -- -- -- -- 5 --    02/17/25 1559 -- -- -- -- -- -- -- -- -- -- -- -- 4 02/17/25 1549 -- -- -- -- -- -- -- -- -- -- -- -- 3    02/17/25 1530 -- -- -- -- -- -- -- -- None (Room air) -- 15 -- 4 02/17/25 15:00:50 99.7 °F (37.6 °C) 120 20 108/75 86 95 % -- -- -- -- -- -- --    02/17/25 1500 -- -- -- -- -- -- -- -- -- -- -- 5 --    02/17/25 1256 -- -- -- -- -- -- -- -- -- -- -- -- 10 - Worst Possible Pain    02/17/25 1100 -- 126 18 134/92 -- 94 % -- -- None (Room air) Sitting 15 5 2    02/17/25 0905 -- -- -- 146/91 -- -- -- -- -- -- -- -- 9    02/17/25 0700 98.8 °F (37.1 °C) 96 20 146/91 100 96 % -- -- None (Room air) -- 15 1 No Pain    02/17/25 0500 -- 80 16 132/78 99 96 % -- -- None (Room air) Lying -- -- --    02/17/25 0430 -- 82 16 129/72 -- 97 % -- -- None (Room air) -- -- -- --    02/17/25 0400 -- -- -- -- -- -- -- -- -- -- -- -- 4 02/17/25 0300 -- -- -- -- -- -- -- -- -- -- 15 -- 5 02/17/25 0000 -- -- -- -- -- -- -- -- -- -- -- -- 5 02/16/25 2300 -- -- -- -- -- -- -- -- -- -- 15 -- --    02/16/25 2130 -- 94 16 136/83 102 96 % -- -- None (Room air) -- -- -- --     02/16/25 2100 -- -- -- -- -- -- -- -- -- -- -- -- 4    02/16/25 1900 -- -- -- -- -- -- -- -- -- -- 15 -- 4    02/16/25 1857 -- -- -- -- -- -- -- -- -- -- -- -- 7    02/16/25 1757 -- -- -- -- -- -- -- -- -- -- -- -- 10 - Worst Possible Pain    02/16/25 1700 -- 112 15 130/80 99 97 % -- -- None (Room air) Lying -- -- 10 - Worst Possible Pain    02/16/25 1455 -- 75 18 142/86 -- 97 % 28 2 L/min Nasal cannula Sitting -- 4 8 02/16/25 1449 -- -- -- 142/86 -- -- -- -- -- -- -- -- 8    02/16/25 1312 -- -- -- -- -- -- -- -- -- -- -- -- 2    02/16/25 1230 97.8 °F (36.6 °C) 119 15 136/87 -- 96 % -- -- Nasal cannula Sitting 15 -- 2    02/16/25 1222 -- -- -- -- -- -- -- -- -- -- 15 -- --          Weight (last 2 days)       None           CIWA-Ar Score       Row Name 02/19/25 0600 02/19/25 0300 02/18/25 2300       CIWA-Ar    Nausea and Vomiting 0 0 0    Tactile Disturbances 0 0 0    Tremor 0 0 2    Auditory Disturbances 0 0 0    Paroxysmal Sweats 0 0 0    Visual Disturbances 0 0 1    Anxiety 1 1 0    Headache, Fullness in Head 0 0 0    Agitation 0 0 0    Orientation and Clouding of Sensorium 0 0 0    CIWA-Ar Total 1 1 3      Row Name 02/18/25 1500 02/18/25 1100 02/18/25 0700       CIWA-Ar    Nausea and Vomiting 0 0 0    Tactile Disturbances 0 0 0    Tremor 2 2 2    Auditory Disturbances 0 0 0    Paroxysmal Sweats 0 0 0    Visual Disturbances 0 0 0    Anxiety 0 1 1    Headache, Fullness in Head 0 0 0    Agitation 0 0 0    Orientation and Clouding of Sensorium 0 0 0    CIWA-Ar Total 2 3 3      Row Name 02/18/25 0300 02/17/25 2300 02/17/25 1900       CIWA-Ar    /75 115/76 --    Nausea and Vomiting 0 0 0    Tactile Disturbances 0 0 0    Tremor 2 2 3    Auditory Disturbances 0 0 0    Paroxysmal Sweats 0 0 0    Visual Disturbances 0 0 0    Anxiety 0 0 2    Headache, Fullness in Head 0 0 0    Agitation 0 0 0    Orientation and Clouding of Sensorium 0 0 0    CIWA-Ar Total 2 2 5      Row Name 02/17/25 1500 02/17/25 1100 02/17/25  0700       CIWA-Ar    BP -- -- 146/91    Nausea and Vomiting 0 0 0    Tactile Disturbances 0 0 0    Tremor 4 4 1    Auditory Disturbances 0 0 0    Paroxysmal Sweats 0 0 0    Visual Disturbances 0 0 0    Anxiety 1 1 0    Headache, Fullness in Head 0 0 0    Agitation 0 0 0    Orientation and Clouding of Sensorium 0 0 0    CIWA-Ar Total 5 5 1      Row Name 02/16/25 1455             CIWA-Ar    /86      Pulse 75      Nausea and Vomiting 0      Tactile Disturbances 0      Tremor 4      Auditory Disturbances 0      Paroxysmal Sweats 0      Visual Disturbances 0      Anxiety 0      Headache, Fullness in Head 0      Agitation 0      Orientation and Clouding of Sensorium 0      CIWA-Ar Total 4                    Pertinent Labs/Diagnostic Results:   Radiology:  XR chest portable   Final Interpretation by Napoleon Hernandez MD (02/19 1142)      Tiny right apical pneumothorax with right chest tube in place. Platelike atelectasis left base. Small amount of soft tissue emphysema right chest wall            Workstation performed: [id number]         XR chest portable   Final Interpretation by Verena Florence MD (02/18 0831)      Further retraction of right chest tube with stable trace right pneumothorax.      Acute right rib fractures not visible.            Workstation performed: [id number]         XR chest portable   Final Interpretation by Verena Florence MD (02/18 0829)      Retraction of right chest tube with decrease in right pneumothorax, now trace.      Acute right rib fractures on CT not visible.            Workstation performed: [id number]         XR chest pa & lateral   Final Interpretation by Verena Florence MD (02/18 0828)      Right chest tube insertion with increase in size of right pneumothorax, large.      Acute right rib fractures on CT not visible.            Workstation performed: [id number]         XR chest pa & lateral   Final Interpretation by Verena Florence MD (02/18 0826)       Increase in right pneumothorax, moderate.      Acute right rib fractures not visible.            Workstation performed: [id number]         XR chest portable   Final Interpretation by Anthony Kumar DO (02/17 1603)      1. Small right pneumothorax, not appreciably changed from x-ray of the previous day.      2. Multiple posterior right rib fractures, better characterized on recent CT.            Workstation performed: [id number]         XR chest portable ICU   Final Interpretation by Anthony Kumar DO (02/17 1557)      1. Small right pneumothorax, not appreciably changed from same day CT when accounting for differences in modality and patient positioning..      2. Redemonstration of multiple right-sided rib fractures which are better characterized on same-day CT.            Workstation performed: [id number]           Cardiology:  ECG 12 lead   Final Result by Víctor Kruse MD (02/17 2041)   Sinus tachycardia   Left axis deviation   Abnormal ECG   When compared with ECG of 16-Feb-2025 13:32, (unconfirmed)   Nonspecific T wave abnormality now evident in Inferior leads   Confirmed by Víctor Kruse ([id number]) on 2/17/2025 8:41:54 PM      ECG 12 lead   Final Result by Napoleon Kong DO (02/18 0832)   Age and gender specific ECG analysis    Sinus tachycardia   Left anterior fascicular block   Abnormal ECG   When compared with ECG of 16-Feb-2025 11:03, (unconfirmed)   No significant change was found   Confirmed by Napoleon Kong ([id number]) on 2/18/2025 8:32:34 AM                  Results from last 7 days   Lab Units 02/19/25  0603 02/18/25  0525 02/17/25  0406 02/16/25  1043 02/14/25  1149   WBC Thousand/uL 7.63 7.71 6.05 10.07 10.89*   HEMOGLOBIN g/dL 13.5 13.2 13.3 14.2 14.7   HEMATOCRIT % 39.0 39.3 40.7 42.3 43.2   PLATELETS Thousands/uL 189 174 194 187 216   TOTAL NEUT ABS Thousands/µL  --  5.05  --  8.05* 8.03*         Results from last 7 days   Lab Units 02/19/25  0603 02/18/25  0525  02/16/25  1043 02/14/25  1149   SODIUM mmol/L 134* 133* 135 135   POTASSIUM mmol/L 3.0* 3.1* 3.8 3.5   CHLORIDE mmol/L 100 97 102 98   CO2 mmol/L 24 26 24 25   ANION GAP mmol/L 10 10 9 12   BUN mg/dL 11 11 13 17   CREATININE mg/dL 0.68 0.64 0.69 0.82   EGFR ml/min/1.73sq m 106 109 106 98   CALCIUM mg/dL 8.5 8.5 8.4 9.3   MAGNESIUM mg/dL  --  1.9  --   --    PHOSPHORUS mg/dL  --  2.7  --   --      Results from last 7 days   Lab Units 02/16/25  1043 02/14/25  1149   AST U/L 43* 44*   ALT U/L 37 36   ALK PHOS U/L 54 61   TOTAL PROTEIN g/dL 7.0 7.5   ALBUMIN g/dL 4.0 4.5   TOTAL BILIRUBIN mg/dL 0.85 0.93         Results from last 7 days   Lab Units 02/19/25  0603 02/18/25  0525 02/16/25  1043   GLUCOSE RANDOM mg/dL 102 120 126     Results from last 7 days   Lab Units 02/17/25  0406 02/16/25  1043   PROTIME seconds 13.7 14.2   INR  1.02 1.05   PTT seconds 35* 28     Results from last 7 days   Lab Units 02/14/25  1149   TSH 3RD GENERATON uIU/mL 1.124       Network Utilization Review Department  ATTENTION: Please call with any questions or concerns to [phone] and carefully listen to the prompts so that you are directed to the right person. All voicemails are confidential.   For Discharge needs, contact Care Management DC Support Team at [phone] opt. 2  Send all requests for admission clinical reviews, approved or denied determinations and any other requests to dedicated fax number below belonging to the campus where the patient is receiving treatment. List of dedicated fax numbers for the Facilities:  FACILITY NAME UR FAX NUMBER   ADMISSION DENIALS (Administrative/Medical Necessity) [phone]   DISCHARGE SUPPORT TEAM (NETWORK) [phone]   PARENT CHILD HEALTH (Maternity/NICU/Pediatrics) [phone]   [facility] [phone]   [facility] [phone]   [facility] [phone]   [facility] [phone]    [facility] [phone]   [facility] [phone]   [facility] [phone]   [facility] [phone]   [facility] [phone]   [facility] [phone]   [facility] [phone]   [facility] [phone]

## 2025-02-19 NOTE — RESTORATIVE TECHNICIAN NOTE
Restorative Technician Note      Patient Name: Michael Wilkinson     [facility] Visit Date: 02/19/25  Note Type: Mobility  Patient Position Upon Consult: Supine  Activity Performed: Ambulated  Assistive Device: Other (Comment) (None)  Patient Position at End of Consult: Supine; All needs within reach; Bed/Chair alarm activated  Nurse Communication: Nurse aware of consult, application of brace    Philipp Soriano, Restorative Technician

## 2025-02-19 NOTE — PROGRESS NOTES
Progress Note - Acute Pain   Name: Michael Wilkinson 55 y.o. male I MRN: [id number]  Unit/Bed#: [facility] 829-01 I Date of Admission: 2/16/2025   Date of Service: 2/19/2025 I Hospital Day: 3    Assessment & Plan  Fracture of multiple ribs of right side  Mechanical fall resulting in Rt. 5-10 rib fractures    Rib Fracture Evaluation:  Chest tube: NA  Respiratory Co-morbidities: General debility  SpO2:   SPO2 RA Rest      Flowsheet Row ED to Hosp-Admission (Current) from 2/16/2025 in [facility] 8   SpO2 95 %   SpO2 Activity At Rest   O2 Device None (Room air)   O2 Flow Rate --                                                                             Incentive Spirometer: Incentive Spirometry Achieved (mL): 2500 mL   Platelet Count:   Results from last 7 days   Lab Units 02/19/25  0603   PLATELETS Thousands/uL 189     Coags:   Results from last 7 days   Lab Units 02/17/25  0406   INR  1.02   PROTIME seconds 13.7     Home anticoagulants: None      Continue PCEA at 10/4/10/3, pain well controlled    Multimodal Analgesia:   - Po tylenol 975mg Q8H   - IV dilaudid 0.5mg Q3H prn for breakthrough pain   - oxycodone 5/10mg Q4H for mod/severe pain   - Lidocaine patches Q12HR   - Gabapentin 100mg TID for neuropathic pain   - PO robaxin 750mg Q6H for muscle spasms   - PRN narcan which taking opioids   - Please hold all AC except for sc heparin while epidural in place      Traumatic pneumothorax    Other specified hypothyroidism    Hypertension      APS will continue to follow. Please contact Acute Pain Service - via SecureChat from [phone] with additional questions or concerns. See [employer] or [employer] for additional contacts and after hours information.     Subjective   Mihcael Wilkinson is a 55 y.o. male who presents with h/o HTN, HLD, hypothyroidism and alcohol abuse presented after mechanical fall resulting in multiple rt.sided rib fractures. APS team consulted per rib fracture protocol.     Patient doing well  this morning. Able to ambulate and max out IS. On room air with good O2 sats. Reports excellent pain control with the epidural. Has some mild discomfort at anterior chest near chest tube. Tolerating diet. We discussed eventual removal of epidural but I think OK to keep in as long as chest tube remains. Anticipate epidural for another 24-48 hrs.    Pain History  Current pain location(s):  Pain Score: 0  Pain Location/Orientation: Orientation: Right, Location: Rib Cage  Pain Scale: Pain Assessment Tool: 0-10  24 hour history: CANDELARIO.    Opioid requirement previous 24 hours: n/a  Meds/Allergies   all current active meds have been reviewed  No Known Allergies  Objective :  Temp:  [97.5 °F (36.4 °C)-98.4 °F (36.9 °C)] 97.5 °F (36.4 °C)  HR:  [] 95  BP: (112-134)/(75-90) 121/80  Resp:  [16-17] 16  SpO2:  [94 %-99 %] 99 %  O2 Device: None (Room air)    Physical Exam  Constitutional:       General: He is not in acute distress.     Appearance: Normal appearance.   Eyes:      Extraocular Movements: Extraocular movements intact.      Conjunctiva/sclera: Conjunctivae normal.   Cardiovascular:      Rate and Rhythm: Normal rate and regular rhythm.   Pulmonary:      Effort: Pulmonary effort is normal. No respiratory distress.      Comments: Right chest tube  Abdominal:      General: Abdomen is flat. There is no distension.      Palpations: Abdomen is soft.   Musculoskeletal:         General: Tenderness and signs of injury present. Normal range of motion.      Cervical back: Normal range of motion and neck supple.   Skin:     General: Skin is warm and dry.   Neurological:      General: No focal deficit present.      Mental Status: He is alert and oriented to person, place, and time.   Psychiatric:         Mood and Affect: Mood normal.         Behavior: Behavior normal.      Epidural: Site clean/dry/intact, no surrounding erythema/edema/induration, infusion functioning appropriately        Lab Results: I have reviewed the  following results:  Estimated Creatinine Clearance: 133.1 mL/min (by C-G formula based on SCr of 0.68 mg/dL).  Lab Results   Component Value Date    WBC 7.63 02/19/2025    HGB 13.5 02/19/2025    HCT 39.0 02/19/2025     02/19/2025         Component Value Date/Time    K 3.0 (L) 02/19/2025 0603     02/19/2025 0603    CO2 24 02/19/2025 0603    BUN 11 02/19/2025 0603    CREATININE 0.68 02/19/2025 0603         Component Value Date/Time    CALCIUM 8.5 02/19/2025 0603    ALKPHOS 54 02/16/2025 1043    AST 43 (H) 02/16/2025 1043    ALT 37 02/16/2025 1043    TP 7.0 02/16/2025 1043    ALB 4.0 02/16/2025 1043       Administrative Statements   I have spent a total time of 30 minutes in caring for this patient on the day of the visit/encounter including Diagnostic results, Prognosis, Risks and benefits of tx options, Instructions for management, Patient and family education, Risk factor reductions, Counseling / Coordination of care, Documenting in the medical record, Obtaining or reviewing history  , and Communicating with other healthcare professionals .

## 2025-02-19 NOTE — ASSESSMENT & PLAN NOTE
Present on 2/16 CT chest in setting of multiple rib fractures  Repeat CXR this morning showing pneumo appears to have improved significantly,   Chest tube to waterseal repeat x-ray in 4 hours.

## 2025-02-19 NOTE — PLAN OF CARE
Problem: Potential for Falls  Goal: Patient will remain free of falls  Description: INTERVENTIONS:  - Educate patient/family on patient safety including physical limitations  - Instruct patient to call for assistance with activity   - Consult OT/PT to assist with strengthening/mobility   - Keep Call bell within reach  - Keep bed low and locked with side rails adjusted as appropriate  - Keep care items and personal belongings within reach  - Initiate and maintain comfort rounds  - Make Fall Risk Sign visible to staff  - Apply yellow socks and bracelet for high fall risk patients  - Consider moving patient to room near nurses station  Outcome: Progressing     Problem: PAIN - ADULT  Goal: Verbalizes/displays adequate comfort level or baseline comfort level  Description: Interventions:  - Encourage patient to monitor pain and request assistance  - Assess pain using appropriate pain scale  - Administer analgesics based on type and severity of pain and evaluate response  - Implement non-pharmacological measures as appropriate and evaluate response  - Consider cultural and social influences on pain and pain management  - Notify physician/advanced practitioner if interventions unsuccessful or patient reports new pain  Outcome: Progressing     Problem: INFECTION - ADULT  Goal: Absence or prevention of progression during hospitalization  Description: INTERVENTIONS:  - Assess and monitor for signs and symptoms of infection  - Monitor lab/diagnostic results  - Monitor all insertion sites, i.e. indwelling lines, tubes, and drains  - Monitor endotracheal if appropriate and nasal secretions for changes in amount and color  - [place] appropriate cooling/warming therapies per order  - Administer medications as ordered  - Instruct and encourage patient and family to use good hand hygiene technique  - Identify and instruct in appropriate isolation precautions for identified infection/condition  Outcome: Progressing  Goal: Absence  of fever/infection during neutropenic period  Description: INTERVENTIONS:  - Monitor WBC    Outcome: Progressing     Problem: SAFETY ADULT  Goal: Patient will remain free of falls  Description: INTERVENTIONS:  - Educate patient/family on patient safety including physical limitations  - Instruct patient to call for assistance with activity   - Consult OT/PT to assist with strengthening/mobility   - Keep Call bell within reach  - Keep bed low and locked with side rails adjusted as appropriate  - Keep care items and personal belongings within reach  - Initiate and maintain comfort rounds  - Make Fall Risk Sign visible to staff  - Apply yellow socks and bracelet for high fall risk patients  - Consider moving patient to room near nurses station  Outcome: Progressing  Goal: Maintain or return to baseline ADL function  Description: INTERVENTIONS:  -  Assess patient's ability to carry out ADLs; assess patient's baseline for ADL function and identify physical deficits which impact ability to perform ADLs (bathing, care of mouth/teeth, toileting, grooming, dressing, etc.)  - Assess/evaluate cause of self-care deficits   - Assess range of motion  - Assess patient's mobility; develop plan if impaired  - Assess patient's need for assistive devices and provide as appropriate  - Encourage maximum independence but intervene and supervise when necessary  - Involve family in performance of ADLs  - Assess for home care needs following discharge   - Consider OT consult to assist with ADL evaluation and planning for discharge  - Provide patient education as appropriate  Outcome: Progressing  Goal: Maintains/Returns to pre admission functional level  Description: INTERVENTIONS:  - Perform AM-PAC 6 Click Basic Mobility/ Daily Activity assessment daily.  - Set and communicate daily mobility goal to care team and patient/family/caregiver.   - Collaborate with rehabilitation services on mobility goals if consulted  - Out of bed for  toileting  - Record patient progress and toleration of activity level   Outcome: Progressing     Problem: DISCHARGE PLANNING  Goal: Discharge to home or other facility with appropriate resources  Description: INTERVENTIONS:  - Identify barriers to discharge w/patient and caregiver  - Arrange for needed discharge resources and transportation as appropriate  - Identify discharge learning needs (meds, wound care, etc.)  - Arrange for interpretive services to assist at discharge as needed  - Refer to Case Management Department for coordinating discharge planning if the patient needs post-hospital services based on physician/advanced practitioner order or complex needs related to functional status, cognitive ability, or social support system  Outcome: Progressing     Problem: Knowledge Deficit  Goal: Patient/family/caregiver demonstrates understanding of disease process, treatment plan, medications, and discharge instructions  Description: Complete learning assessment and assess knowledge base.  Interventions:  - Provide teaching at level of understanding  - Provide teaching via preferred learning methods  Outcome: Progressing     Problem: Prexisting or High Potential for Compromised Skin Integrity  Goal: Skin integrity is maintained or improved  Description: INTERVENTIONS:  - Identify patients at risk for skin breakdown  - Assess and monitor skin integrity  - Assess and monitor nutrition and hydration status  - Monitor labs   - Assess for incontinence   - Turn and reposition patient  - Assist with mobility/ambulation  - Relieve pressure over bony prominences  - Avoid friction and shearing  - Provide appropriate hygiene as needed including keeping skin clean and dry  - Evaluate need for skin moisturizer/barrier cream  - Collaborate with interdisciplinary team   - Patient/family teaching  - Consider wound care consult   Outcome: Progressing

## 2025-02-19 NOTE — ASSESSMENT & PLAN NOTE
Mechanical fall resulting in Rt. 5-10 rib fractures    Rib Fracture Evaluation:  Chest tube: NA  Respiratory Co-morbidities: General debility  SpO2:   SPO2 RA Rest      Flowsheet Row ED to Hosp-Admission (Current) from 2/16/2025 in [facility] 8   SpO2 95 %   SpO2 Activity At Rest   O2 Device None (Room air)   O2 Flow Rate --                                                                             Incentive Spirometer: Incentive Spirometry Achieved (mL): 2500 mL   Platelet Count:   Results from last 7 days   Lab Units 02/19/25  0603   PLATELETS Thousands/uL 189     Coags:   Results from last 7 days   Lab Units 02/17/25  0406   INR  1.02   PROTIME seconds 13.7     Home anticoagulants: None      Continue PCEA at 10/4/10/3, pain well controlled    Multimodal Analgesia:   - Po tylenol 975mg Q8H   - IV dilaudid 0.5mg Q3H prn for breakthrough pain   - oxycodone 5/10mg Q4H for mod/severe pain   - Lidocaine patches Q12HR   - Gabapentin 100mg TID for neuropathic pain   - PO robaxin 750mg Q6H for muscle spasms   - PRN narcan which taking opioids   - Please hold all AC except for sc heparin while epidural in place

## 2025-02-20 ENCOUNTER — APPOINTMENT (INPATIENT)
Dept: RADIOLOGY | Facility: HOSPITAL | Age: 55
DRG: 183 | End: 2025-02-20
Payer: COMMERCIAL

## 2025-02-20 PROCEDURE — 99232 SBSQ HOSP IP/OBS MODERATE 35: CPT | Performed by: STUDENT IN AN ORGANIZED HEALTH CARE EDUCATION/TRAINING PROGRAM

## 2025-02-20 PROCEDURE — 71045 X-RAY EXAM CHEST 1 VIEW: CPT

## 2025-02-20 PROCEDURE — 99233 SBSQ HOSP IP/OBS HIGH 50: CPT | Performed by: STUDENT IN AN ORGANIZED HEALTH CARE EDUCATION/TRAINING PROGRAM

## 2025-02-20 RX ADMIN — HEPARIN SODIUM 5000 UNITS: 5000 INJECTION INTRAVENOUS; SUBCUTANEOUS at 21:02

## 2025-02-20 RX ADMIN — HEPARIN SODIUM 5000 UNITS: 5000 INJECTION INTRAVENOUS; SUBCUTANEOUS at 15:33

## 2025-02-20 RX ADMIN — GABAPENTIN 100 MG: 100 CAPSULE ORAL at 08:35

## 2025-02-20 RX ADMIN — GABAPENTIN 100 MG: 100 CAPSULE ORAL at 18:07

## 2025-02-20 RX ADMIN — METHOCARBAMOL 750 MG: 750 TABLET ORAL at 22:08

## 2025-02-20 RX ADMIN — LOSARTAN POTASSIUM 100 MG: 50 TABLET, FILM COATED ORAL at 08:35

## 2025-02-20 RX ADMIN — AMLODIPINE BESYLATE 10 MG: 10 TABLET ORAL at 08:35

## 2025-02-20 RX ADMIN — HYDROCHLOROTHIAZIDE 25 MG: 25 TABLET ORAL at 08:35

## 2025-02-20 RX ADMIN — METHOCARBAMOL 750 MG: 750 TABLET ORAL at 18:07

## 2025-02-20 RX ADMIN — METHOCARBAMOL 750 MG: 750 TABLET ORAL at 05:38

## 2025-02-20 RX ADMIN — Medication 9 MG: at 21:02

## 2025-02-20 RX ADMIN — GABAPENTIN 100 MG: 100 CAPSULE ORAL at 21:02

## 2025-02-20 RX ADMIN — SENNOSIDES AND DOCUSATE SODIUM 1 TABLET: 50; 8.6 TABLET ORAL at 21:02

## 2025-02-20 RX ADMIN — ROPIVACAINE HYDROCHLORIDE: 5 INJECTION EPIDURAL; INFILTRATION; PERINEURAL at 06:20

## 2025-02-20 RX ADMIN — ACETAMINOPHEN 975 MG: 325 TABLET, FILM COATED ORAL at 21:02

## 2025-02-20 RX ADMIN — HEPARIN SODIUM 5000 UNITS: 5000 INJECTION INTRAVENOUS; SUBCUTANEOUS at 05:37

## 2025-02-20 RX ADMIN — METHOCARBAMOL 750 MG: 750 TABLET ORAL at 12:30

## 2025-02-20 RX ADMIN — LEVOTHYROXINE SODIUM 75 MCG: 0.07 TABLET ORAL at 08:35

## 2025-02-20 RX ADMIN — ACETAMINOPHEN 975 MG: 325 TABLET, FILM COATED ORAL at 05:36

## 2025-02-20 RX ADMIN — ATORVASTATIN CALCIUM 10 MG: 10 TABLET, FILM COATED ORAL at 18:07

## 2025-02-20 RX ADMIN — ACETAMINOPHEN 975 MG: 325 TABLET, FILM COATED ORAL at 15:33

## 2025-02-20 NOTE — PROGRESS NOTES
Progress Note - Trauma   Name: Michael Wilkinson 55 y.o. male I MRN: [id number]  [facility] I Date of Admission: 2/16/2025   Date of Service: 2/20/2025 I Hospital Day: 4     Assessment & Plan  Fracture of multiple ribs of right side  - Multiple right-sided rib fractures (5-10), present on admission.  - Continue rib fracture protocol.  - Continue to encourage incentive spirometer use and adequate pulmonary hygiene.    - Appreciate APS evaluation and recommendations.  - Continue multimodal analgesic regimen.  - Supplemental oxygen via nasal cannula as needed to maintain saturations greater than or equal to 94%.  - PT and OT   - Outpatient follow-up in the trauma clinic for re-evaluation in approximately 2 weeks.  Traumatic pneumothorax  Present on 2/16 CT chest in setting of multiple rib fractures  Chest tube to water seal  2/20 CXR reviewed, small apical pneumothorax present  Plan for repeat CXR in am of 2/21 to reassess pneumothorax   Other specified hypothyroidism  Continue home levothyroxine   Hypertension  Continue home medications    Bowel Regimen: senna-s  VTE Prophylaxis:VTE covered by:  heparin (porcine), Subcutaneous, 5,000 Units at 02/20/25 0537         Disposition:  continue inpatient on trauma service    24 Hour Events : No acute events overnight per nursing staff.   Subjective : Patient has no complaints at time of evaluation.     Objective :  Temp:  [97.5 °F (36.4 °C)-98.5 °F (36.9 °C)] 97.5 °F (36.4 °C)  HR:  [85-97] 85  BP: (120-125)/(78-87) 124/84  Resp:  [14-20] 15  SpO2:  [93 %-96 %] 96 %    I/O         02/18 0701  02/19 0700 02/19 0701  02/20 0700 02/20 0701  02/21 0700    P.O. 236 820     I.V. (mL/kg) 278.4 271.1     Total Intake(mL/kg) 514.4 1091.1     Urine (mL/kg/hr) 1450 925     Stool 0 0     Chest Tube 110      Total Output 1560 925     Net -1045.6 +166.1            Unmeasured Urine Occurrence  1 x     Unmeasured Stool Occurrence 0 x 1 x           Lines/Drains/Airways       Active  Status       Name Placement date Placement time Site Days    Epidural Catheter 02/17/25 02/17/25  1400  -- 2    Chest Tube Right 02/17/25  1910  --  2                  Physical Exam  Constitutional:       Appearance: Normal appearance.   HENT:      Head: Normocephalic and atraumatic.      Nose: Nose normal.      Mouth/Throat:      Mouth: Mucous membranes are moist.      Pharynx: Oropharynx is clear.   Eyes:      Extraocular Movements: Extraocular movements intact.      Conjunctiva/sclera: Conjunctivae normal.   Cardiovascular:      Rate and Rhythm: Normal rate and regular rhythm.      Heart sounds: Normal heart sounds.   Pulmonary:      Effort: Pulmonary effort is normal.      Breath sounds: Normal breath sounds.      Comments: Right CT to water seal, no air leak. Scant serosanguinous fluid in tubing   Musculoskeletal:         General: Normal range of motion.      Cervical back: Normal range of motion and neck supple.   Skin:     General: Skin is warm and dry.   Neurological:      Mental Status: He is alert and oriented to person, place, and time.   Psychiatric:         Mood and Affect: Mood normal.         Behavior: Behavior normal.        PIC Score  PIC Pain Score: 3 (2/20/2025  6:20 AM)  PIC Incentive Spirometry Score: 4 (2/20/2025  3:00 AM)  PIC Cough Description: 3 (2/20/2025  3:00 AM)  PIC Total Score: 10 (2/20/2025  3:00 AM)       If the Total PIC Score </=5, did you consult APS and evaluate patient for further intervention?: N/A      Pain:    Incentive Spirometry  Cough  3 = Controlled  4 = Above goal volume 3 = Strong  2 = Moderate  3 = Goal to alert volume 2 = Weak  1 = Severe  2 = Below alert volume 1 = Absent     1 = Unable to perform IS           Lab Results: I have reviewed the following results:  Recent Labs     02/18/25  0525 02/19/25  0603   WBC 7.71 7.63   HGB 13.2 13.5   HCT 39.3 39.0    189   SODIUM 133* 134*   K 3.1* 3.0*   CL 97 100   CO2 26 24   BUN 11 11   CREATININE 0.64 0.68   GLUC  120 102   MG 1.9  --    PHOS 2.7  --        Imaging Results Review: No pertinent imaging studies reviewed.  Other Study Results Review: No additional pertinent studies reviewed.

## 2025-02-20 NOTE — ASSESSMENT & PLAN NOTE
- Multiple right-sided rib fractures (5-10), present on admission.  - Continue rib fracture protocol.  - Continue to encourage incentive spirometer use and adequate pulmonary hygiene.    - Appreciate APS evaluation and recommendations.  - Continue multimodal analgesic regimen.  - Supplemental oxygen via nasal cannula as needed to maintain saturations greater than or equal to 94%.  - PT and OT   - Outpatient follow-up in the trauma clinic for re-evaluation in approximately 2 weeks.

## 2025-02-20 NOTE — ASSESSMENT & PLAN NOTE
Mechanical fall resulting in Rt. 5-10 rib fractures    Rib Fracture Evaluation:  Chest tube: NA  Respiratory Co-morbidities: General debility  SpO2:   SPO2 RA Rest      Flowsheet Row ED to Hosp-Admission (Current) from 2/16/2025 in [facility] 8   SpO2 95 %   SpO2 Activity At Rest   O2 Device None (Room air)   O2 Flow Rate --                                                                             Incentive Spirometer: Incentive Spirometry Achieved (mL): 2500 mL   Platelet Count:   Results from last 7 days   Lab Units 02/19/25  0603   PLATELETS Thousands/uL 189     Coags:   Results from last 7 days   Lab Units 02/17/25  0406   INR  1.02   PROTIME seconds 13.7     Home anticoagulants: None      Continue PCEA at 10/4/10/3, pain well controlled   - if CT is removed, can plan for d/c epidural 2/21, hold AM SQH    Multimodal Analgesia:   - Po tylenol 975mg Q8H   - IV dilaudid 0.5mg Q3H prn for breakthrough pain   - oxycodone 5/10mg Q4H for mod/severe pain   - Lidocaine patches Q12HR   - Gabapentin 100mg TID for neuropathic pain   - PO robaxin 750mg Q6H for muscle spasms   - PRN narcan which taking opioids   - Please hold all AC except for sc heparin while epidural in place

## 2025-02-20 NOTE — PLAN OF CARE
Problem: Potential for Falls  Goal: Patient will remain free of falls  Description: INTERVENTIONS:  - Educate patient/family on patient safety including physical limitations  - Instruct patient to call for assistance with activity   - Consult OT/PT to assist with strengthening/mobility   - Keep Call bell within reach  - Keep bed low and locked with side rails adjusted as appropriate  - Keep care items and personal belongings within reach  - Initiate and maintain comfort rounds  - Make Fall Risk Sign visible to staff  - Initiate/Maintain alarm  - Obtain necessary fall risk management equipment:   - Apply yellow socks and bracelet for high fall risk patients  - Consider moving patient to room near nurses station  Outcome: Progressing     Problem: PAIN - ADULT  Goal: Verbalizes/displays adequate comfort level or baseline comfort level  Description: Interventions:  - Encourage patient to monitor pain and request assistance  - Assess pain using appropriate pain scale  - Administer analgesics based on type and severity of pain and evaluate response  - Implement non-pharmacological measures as appropriate and evaluate response  - Consider cultural and social influences on pain and pain management  - Notify physician/advanced practitioner if interventions unsuccessful or patient reports new pain  Outcome: Progressing     Problem: SAFETY ADULT  Goal: Patient will remain free of falls  Description: INTERVENTIONS:  - Educate patient/family on patient safety including physical limitations  - Instruct patient to call for assistance with activity   - Consult OT/PT to assist with strengthening/mobility   - Keep Call bell within reach  - Keep bed low and locked with side rails adjusted as appropriate  - Keep care items and personal belongings within reach  - Initiate and maintain comfort rounds  - Make Fall Risk Sign visible to staff  - Initiate/Maintain alarm  - Obtain necessary fall risk management equipment:   - Apply yellow  socks and bracelet for high fall risk patients  - Consider moving patient to room near nurses station  Outcome: Progressing  Goal: Maintain or return to baseline ADL function  Description: INTERVENTIONS:  -  Assess patient's ability to carry out ADLs; assess patient's baseline for ADL function and identify physical deficits which impact ability to perform ADLs (bathing, care of mouth/teeth, toileting, grooming, dressing, etc.)  - Assess/evaluate cause of self-care deficits   - Assess range of motion  - Assess patient's mobility; develop plan if impaired  - Assess patient's need for assistive devices and provide as appropriate  - Encourage maximum independence but intervene and supervise when necessary  - Involve family in performance of ADLs  - Assess for home care needs following discharge   - Consider OT consult to assist with ADL evaluation and planning for discharge  - Provide patient education as appropriate  Outcome: Progressing  Goal: Maintains/Returns to pre admission functional level  Description: INTERVENTIONS:  - Perform AM-PAC 6 Click Basic Mobility/ Daily Activity assessment daily.  - Set and communicate daily mobility goal to care team and patient/family/caregiver.   - Collaborate with rehabilitation services on mobility goals if consulted    - Out of bed for toileting  - Record patient progress and toleration of activity level   Outcome: Progressing     Problem: INFECTION - ADULT  Goal: Absence or prevention of progression during hospitalization  Description: INTERVENTIONS:  - Assess and monitor for signs and symptoms of infection  - Monitor lab/diagnostic results  - Monitor all insertion sites, i.e. indwelling lines, tubes, and drains  - Monitor endotracheal if appropriate and nasal secretions for changes in amount and color  - [place] appropriate cooling/warming therapies per order  - Administer medications as ordered  - Instruct and encourage patient and family to use good hand hygiene  technique  - Identify and instruct in appropriate isolation precautions for identified infection/condition  Outcome: Progressing  Goal: Absence of fever/infection during neutropenic period  Description: INTERVENTIONS:  - Monitor WBC    Outcome: Progressing

## 2025-02-20 NOTE — ASSESSMENT & PLAN NOTE
Present on 2/16 CT chest in setting of multiple rib fractures  Chest tube to water seal  2/20 CXR reviewed, small apical pneumothorax present  Plan for repeat CXR in am of 2/21 to reassess pneumothorax

## 2025-02-20 NOTE — PROGRESS NOTES
Progress Note - Acute Pain   Name: Michael Wilkinson 55 y.o. male I MRN: [id number]  Unit/Bed#: [facility] 829-01 I Date of Admission: 2/16/2025   Date of Service: 2/20/2025 I Hospital Day: 4      Pt seen and examined today, sitting up right, pain controlled. Room air, IS maxed out, good cough clearing.  Denies n/v/pruritus/LE weakness.  CT potential removal today.  Plan for epidural removal tomorrow if so  Assessment & Plan  Fracture of multiple ribs of right side  Mechanical fall resulting in Rt. 5-10 rib fractures    Rib Fracture Evaluation:  Chest tube: NA  Respiratory Co-morbidities: General debility  SpO2:   SPO2 RA Rest      Flowsheet Row ED to Hosp-Admission (Current) from 2/16/2025 in [facility] 8   SpO2 95 %   SpO2 Activity At Rest   O2 Device None (Room air)   O2 Flow Rate --                                                                             Incentive Spirometer: Incentive Spirometry Achieved (mL): 2500 mL   Platelet Count:   Results from last 7 days   Lab Units 02/19/25  0603   PLATELETS Thousands/uL 189     Coags:   Results from last 7 days   Lab Units 02/17/25  0406   INR  1.02   PROTIME seconds 13.7     Home anticoagulants: None      Continue PCEA at 10/4/10/3, pain well controlled   - if CT is removed, can plan for d/c epidural 2/21, hold AM SQH    Multimodal Analgesia:   - Po tylenol 975mg Q8H   - IV dilaudid 0.5mg Q3H prn for breakthrough pain   - oxycodone 5/10mg Q4H for mod/severe pain   - Lidocaine patches Q12HR   - Gabapentin 100mg TID for neuropathic pain   - PO robaxin 750mg Q6H for muscle spasms   - PRN narcan which taking opioids   - Please hold all AC except for sc heparin while epidural in place      Traumatic pneumothorax    Other specified hypothyroidism    Hypertension      APS will continue to follow. Please contact Acute Pain Service - via SecureChat from [phone] with additional questions or concerns. See [employer] or [employer] for additional contacts and after  hours information.     Subjective   Michael Wilkinson is a 55 y.o. male who presents with h/o HTN, HLD, hypothyroidism and alcohol abuse presented after mechanical fall resulting in multiple rt.sided rib fractures. APS team consulted per rib fracture protocol.     Pain History  Current pain location(s):  Pain Score: 0  Pain Location/Orientation: Orientation: Right, Location: Rib Cage  Pain Scale: Pain Assessment Tool: 0-10  24 hour history: as above    Opioid requirement previous 24 hours: 0  Meds/Allergies   all current active meds have been reviewed  No Known Allergies  Objective :  Temp:  [97.5 °F (36.4 °C)-98.5 °F (36.9 °C)] 98.4 °F (36.9 °C)  HR:  [85-97] 89  BP: (123-125)/(81-87) 123/81  Resp:  [14-20] 16  SpO2:  [93 %-96 %] 94 %  O2 Device: None (Room air)    Physical Exam  Vitals and nursing note reviewed.   Constitutional:       General: He is not in acute distress.     Appearance: Normal appearance.   HENT:      Head: Normocephalic and atraumatic.      Mouth/Throat:      Mouth: Mucous membranes are moist.   Eyes:      Extraocular Movements: Extraocular movements intact.   Cardiovascular:      Rate and Rhythm: Normal rate.   Pulmonary:      Effort: Pulmonary effort is normal.      Breath sounds: No rhonchi.   Chest:      Chest wall: No tenderness (mild).   Abdominal:      General: Abdomen is flat.   Musculoskeletal:         General: No swelling.   Skin:     General: Skin is warm and dry.   Neurological:      Mental Status: He is alert and oriented to person, place, and time.   Psychiatric:         Mood and Affect: Mood normal.         Behavior: Behavior normal.      Epidural: Site clean/dry/intact, no surrounding erythema/edema/induration, infusion functioning appropriately        Lab Results: I have reviewed the following results:  Estimated Creatinine Clearance: 133.1 mL/min (by C-G formula based on SCr of 0.68 mg/dL).  Lab Results   Component Value Date    WBC 7.63 02/19/2025    HGB 13.5 02/19/2025    HCT  39.0 02/19/2025     02/19/2025         Component Value Date/Time    K 3.0 (L) 02/19/2025 0603     02/19/2025 0603    CO2 24 02/19/2025 0603    BUN 11 02/19/2025 0603    CREATININE 0.68 02/19/2025 0603         Component Value Date/Time    CALCIUM 8.5 02/19/2025 0603    ALKPHOS 54 02/16/2025 1043    AST 43 (H) 02/16/2025 1043    ALT 37 02/16/2025 1043    TP 7.0 02/16/2025 1043    ALB 4.0 02/16/2025 1043       Imaging Results Review: No pertinent imaging studies reviewed.  Other Study Results Review: No additional pertinent studies reviewed.     Administrative Statements   I have spent a total time of 20 minutes in caring for this patient on the day of the visit/encounter including Risks and benefits of tx options, Instructions for management, Patient and family education, Importance of tx compliance, Risk factor reductions, Impressions, Counseling / Coordination of care, Documenting in the medical record, Reviewing/placing orders in the medical record (including tests, medications, and/or procedures), Obtaining or reviewing history  , and Communicating with other healthcare professionals .

## 2025-02-20 NOTE — PLAN OF CARE
Problem: Potential for Falls  Goal: Patient will remain free of falls  Description: INTERVENTIONS:  - Educate patient/family on patient safety including physical limitations  - Instruct patient to call for assistance with activity   - Consult OT/PT to assist with strengthening/mobility   - Keep Call bell within reach  - Keep bed low and locked with side rails adjusted as appropriate  - Keep care items and personal belongings within reach  - Initiate and maintain comfort rounds  - Make Fall Risk Sign visible to staff  - Apply yellow socks and bracelet for high fall risk patients  - Consider moving patient to room near nurses station  Outcome: Progressing     Problem: PAIN - ADULT  Goal: Verbalizes/displays adequate comfort level or baseline comfort level  Description: Interventions:  - Encourage patient to monitor pain and request assistance  - Assess pain using appropriate pain scale  - Administer analgesics based on type and severity of pain and evaluate response  - Implement non-pharmacological measures as appropriate and evaluate response  - Consider cultural and social influences on pain and pain management  - Notify physician/advanced practitioner if interventions unsuccessful or patient reports new pain  Outcome: Progressing     Problem: INFECTION - ADULT  Goal: Absence or prevention of progression during hospitalization  Description: INTERVENTIONS:  - Assess and monitor for signs and symptoms of infection  - Monitor lab/diagnostic results  - Monitor all insertion sites, i.e. indwelling lines, tubes, and drains  - Monitor endotracheal if appropriate and nasal secretions for changes in amount and color  - [place] appropriate cooling/warming therapies per order  - Administer medications as ordered  - Instruct and encourage patient and family to use good hand hygiene technique  - Identify and instruct in appropriate isolation precautions for identified infection/condition  Outcome: Progressing  Goal: Absence  of fever/infection during neutropenic period  Description: INTERVENTIONS:  - Monitor WBC    Outcome: Progressing     Problem: SAFETY ADULT  Goal: Patient will remain free of falls  Description: INTERVENTIONS:  - Educate patient/family on patient safety including physical limitations  - Instruct patient to call for assistance with activity   - Consult OT/PT to assist with strengthening/mobility   - Keep Call bell within reach  - Keep bed low and locked with side rails adjusted as appropriate  - Keep care items and personal belongings within reach  - Initiate and maintain comfort rounds  - Make Fall Risk Sign visible to staff  - Apply yellow socks and bracelet for high fall risk patients  - Consider moving patient to room near nurses station  Outcome: Progressing  Goal: Maintain or return to baseline ADL function  Description: INTERVENTIONS:  -  Assess patient's ability to carry out ADLs; assess patient's baseline for ADL function and identify physical deficits which impact ability to perform ADLs (bathing, care of mouth/teeth, toileting, grooming, dressing, etc.)  - Assess/evaluate cause of self-care deficits   - Assess range of motion  - Assess patient's mobility; develop plan if impaired  - Assess patient's need for assistive devices and provide as appropriate  - Encourage maximum independence but intervene and supervise when necessary  - Involve family in performance of ADLs  - Assess for home care needs following discharge   - Consider OT consult to assist with ADL evaluation and planning for discharge  - Provide patient education as appropriate  Outcome: Progressing  Goal: Maintains/Returns to pre admission functional level  Description: INTERVENTIONS:  - Perform AM-PAC 6 Click Basic Mobility/ Daily Activity assessment daily.  - Set and communicate daily mobility goal to care team and patient/family/caregiver.   - Collaborate with rehabilitation services on mobility goals if consulted  - Out of bed for  toileting  - Record patient progress and toleration of activity level   Outcome: Progressing     Problem: DISCHARGE PLANNING  Goal: Discharge to home or other facility with appropriate resources  Description: INTERVENTIONS:  - Identify barriers to discharge w/patient and caregiver  - Arrange for needed discharge resources and transportation as appropriate  - Identify discharge learning needs (meds, wound care, etc.)  - Arrange for interpretive services to assist at discharge as needed  - Refer to Case Management Department for coordinating discharge planning if the patient needs post-hospital services based on physician/advanced practitioner order or complex needs related to functional status, cognitive ability, or social support system  Outcome: Progressing     Problem: Knowledge Deficit  Goal: Patient/family/caregiver demonstrates understanding of disease process, treatment plan, medications, and discharge instructions  Description: Complete learning assessment and assess knowledge base.  Interventions:  - Provide teaching at level of understanding  - Provide teaching via preferred learning methods  Outcome: Progressing     Problem: Prexisting or High Potential for Compromised Skin Integrity  Goal: Skin integrity is maintained or improved  Description: INTERVENTIONS:  - Identify patients at risk for skin breakdown  - Assess and monitor skin integrity  - Assess and monitor nutrition and hydration status  - Monitor labs   - Assess for incontinence   - Turn and reposition patient  - Assist with mobility/ambulation  - Relieve pressure over bony prominences  - Avoid friction and shearing  - Provide appropriate hygiene as needed including keeping skin clean and dry  - Evaluate need for skin moisturizer/barrier cream  - Collaborate with interdisciplinary team   - Patient/family teaching  - Consider wound care consult   Outcome: Progressing

## 2025-02-21 ENCOUNTER — APPOINTMENT (INPATIENT)
Dept: RADIOLOGY | Facility: HOSPITAL | Age: 55
DRG: 183 | End: 2025-02-21
Payer: COMMERCIAL

## 2025-02-21 LAB
ATRIAL RATE: 121 BPM
P AXIS: 78 DEGREES
PR INTERVAL: 150 MS
QRS AXIS: -62 DEGREES
QRSD INTERVAL: 110 MS
QT INTERVAL: 330 MS
QTC INTERVAL: 468 MS
T WAVE AXIS: 63 DEGREES
VENTRICULAR RATE: 121 BPM

## 2025-02-21 PROCEDURE — 99232 SBSQ HOSP IP/OBS MODERATE 35: CPT | Performed by: STUDENT IN AN ORGANIZED HEALTH CARE EDUCATION/TRAINING PROGRAM

## 2025-02-21 PROCEDURE — 0WP9X0Z REMOVAL OF DRAINAGE DEVICE FROM RIGHT PLEURAL CAVITY, EXTERNAL APPROACH: ICD-10-PCS | Performed by: STUDENT IN AN ORGANIZED HEALTH CARE EDUCATION/TRAINING PROGRAM

## 2025-02-21 PROCEDURE — 71045 X-RAY EXAM CHEST 1 VIEW: CPT

## 2025-02-21 PROCEDURE — 00PUX3Z REMOVAL OF INFUSION DEVICE FROM SPINAL CANAL, EXTERNAL APPROACH: ICD-10-PCS | Performed by: STUDENT IN AN ORGANIZED HEALTH CARE EDUCATION/TRAINING PROGRAM

## 2025-02-21 PROCEDURE — 93010 ELECTROCARDIOGRAM REPORT: CPT | Performed by: INTERNAL MEDICINE

## 2025-02-21 RX ADMIN — LOSARTAN POTASSIUM 100 MG: 50 TABLET, FILM COATED ORAL at 09:51

## 2025-02-21 RX ADMIN — GABAPENTIN 100 MG: 100 CAPSULE ORAL at 21:24

## 2025-02-21 RX ADMIN — AMLODIPINE BESYLATE 10 MG: 10 TABLET ORAL at 09:51

## 2025-02-21 RX ADMIN — GABAPENTIN 100 MG: 100 CAPSULE ORAL at 16:57

## 2025-02-21 RX ADMIN — ACETAMINOPHEN 975 MG: 325 TABLET, FILM COATED ORAL at 05:17

## 2025-02-21 RX ADMIN — OXYCODONE HYDROCHLORIDE 10 MG: 10 TABLET ORAL at 20:34

## 2025-02-21 RX ADMIN — HYDROCHLOROTHIAZIDE 25 MG: 25 TABLET ORAL at 09:51

## 2025-02-21 RX ADMIN — GABAPENTIN 100 MG: 100 CAPSULE ORAL at 09:52

## 2025-02-21 RX ADMIN — METHOCARBAMOL 750 MG: 750 TABLET ORAL at 11:54

## 2025-02-21 RX ADMIN — SENNOSIDES AND DOCUSATE SODIUM 1 TABLET: 50; 8.6 TABLET ORAL at 21:24

## 2025-02-21 RX ADMIN — ROPIVACAINE HYDROCHLORIDE: 5 INJECTION EPIDURAL; INFILTRATION; PERINEURAL at 01:55

## 2025-02-21 RX ADMIN — ATORVASTATIN CALCIUM 10 MG: 10 TABLET, FILM COATED ORAL at 16:58

## 2025-02-21 RX ADMIN — LEVOTHYROXINE SODIUM 75 MCG: 0.07 TABLET ORAL at 09:52

## 2025-02-21 RX ADMIN — METHOCARBAMOL 750 MG: 750 TABLET ORAL at 05:17

## 2025-02-21 RX ADMIN — Medication 9 MG: at 21:24

## 2025-02-21 RX ADMIN — ACETAMINOPHEN 975 MG: 325 TABLET, FILM COATED ORAL at 21:24

## 2025-02-21 RX ADMIN — HYDROMORPHONE HYDROCHLORIDE 0.5 MG: 1 INJECTION, SOLUTION INTRAMUSCULAR; INTRAVENOUS; SUBCUTANEOUS at 14:38

## 2025-02-21 RX ADMIN — OXYCODONE HYDROCHLORIDE 5 MG: 5 TABLET ORAL at 12:06

## 2025-02-21 RX ADMIN — HEPARIN SODIUM 5000 UNITS: 5000 INJECTION INTRAVENOUS; SUBCUTANEOUS at 21:23

## 2025-02-21 RX ADMIN — METHOCARBAMOL 750 MG: 750 TABLET ORAL at 16:58

## 2025-02-21 NOTE — ASSESSMENT & PLAN NOTE
Mechanical fall resulting in Rt. 5-10 rib fractures    Rib Fracture Evaluation:  Chest tube: NA  Respiratory Co-morbidities: General debility  SpO2:   SPO2 RA Rest      Flowsheet Row ED to Hosp-Admission (Current) from 2/16/2025 in [facility] 8   SpO2 95 %   SpO2 Activity At Rest   O2 Device None (Room air)   O2 Flow Rate --                                                                             Incentive Spirometer: Incentive Spirometry Achieved (mL): 2500 mL   Platelet Count:   Results from last 7 days   Lab Units 02/19/25  0603   PLATELETS Thousands/uL 189     Coags:   Results from last 7 days   Lab Units 02/17/25  0406   INR  1.02   PROTIME seconds 13.7     Home anticoagulants: None      Continue PCEA at 10/4/10/3, pain well controlled   - if CT is removed, can plan for d/c epidural 2/21, hold AM SQH   - Epidural removed 2/21 tip intact    Multimodal Analgesia:   - Po tylenol 975mg Q8H   - IV dilaudid 0.5mg Q3H prn for breakthrough pain   - oxycodone 5/10mg Q4H for mod/severe pain   - Lidocaine patches Q12HR   - Gabapentin 100mg TID for neuropathic pain   - PO robaxin 750mg Q6H for muscle spasms   - PRN narcan which taking opioids   - Please hold all AC except for sc heparin while epidural in place

## 2025-02-21 NOTE — QUICK NOTE
Chest tube removal:  Chest tube has been on waterseal for greater than 24 hours, this morning's CXR was reviewed with Dr. Butler, was deemed stable, and approved for chest tube removal.  Patient was educated on chest tube removal and follow-up process.  Sutures were removed and tube was pulled up during patient's expiration. Thoracostomy site was immediately covered with occlusive dressing (Xeroform, 4 x 4, Tegaderm).  Patient tolerated the procedure well.  She was educated on S/S to notify nursing staff including but not limited to shortness of breath, difficulty breathing, worsening/new chest pain, dressing falling off--patient verbally confirmed understanding.  CXR ordered for 4 hours from removal.

## 2025-02-21 NOTE — CASE MANAGEMENT
Case Management Discharge Planning Note    Patient name Michael Belle [facility] 829/[facility] 829-01 MRN [id number]  : 1970 Date 2025       Current Admission Date: 2025  Current Admission Diagnosis:Fracture of multiple ribs of right side   Patient Active Problem List    Diagnosis Date Noted Date Diagnosed    Traumatic pneumothorax 2025     Other specified hypothyroidism 2025     Hypertension 2025     Fracture of multiple ribs of right side 2025     Intestinal stoma prolapse (HCC) 2021     Sinus tachycardia 2020     Hyponatremia 2020     Colonic diverticular abscess      Impaired nutrition 10/19/2020     Hyperlipidemia 10/12/2020     HTN (hypertension) 10/12/2020     Hypothyroidism 10/12/2020       LOS (days): 5  Geometric Mean LOS (GMLOS) (days):   Days to GMLOS:     OBJECTIVE:  Risk of Unplanned Readmission Score: 11.15         Current admission status: Inpatient   Preferred Pharmacy:   [id number] - [place] - [address] [address]  Phone: [phone] Fax: [phone]    [facility] - [place] - [address] [place] [address].  [facility] [address]  Phone: [phone] Fax: [phone]    Primary Care Provider: Noelle Santana DO    Primary Insurance: AETNA  Secondary Insurance:     DISCHARGE DETAILS:    Pt's chest tube remains. Once removed, pt's plan is for a home d/c

## 2025-02-21 NOTE — PLAN OF CARE
Problem: Potential for Falls  Goal: Patient will remain free of falls  Description: INTERVENTIONS:  - Educate patient/family on patient safety including physical limitations  - Instruct patient to call for assistance with activity   - Consult OT/PT to assist with strengthening/mobility   - Keep Call bell within reach  - Keep bed low and locked with side rails adjusted as appropriate  - Keep care items and personal belongings within reach  - Initiate and maintain comfort rounds  - Make Fall Risk Sign visible to staff    - Apply yellow socks and bracelet for high fall risk patients  - Consider moving patient to room near nurses station  Outcome: Progressing     Problem: PAIN - ADULT  Goal: Verbalizes/displays adequate comfort level or baseline comfort level  Description: Interventions:  - Encourage patient to monitor pain and request assistance  - Assess pain using appropriate pain scale  - Administer analgesics based on type and severity of pain and evaluate response  - Implement non-pharmacological measures as appropriate and evaluate response  - Consider cultural and social influences on pain and pain management  - Notify physician/advanced practitioner if interventions unsuccessful or patient reports new pain  Outcome: Progressing     Problem: INFECTION - ADULT  Goal: Absence or prevention of progression during hospitalization  Description: INTERVENTIONS:  - Assess and monitor for signs and symptoms of infection  - Monitor lab/diagnostic results  - Monitor all insertion sites, i.e. indwelling lines, tubes, and drains  - Monitor endotracheal if appropriate and nasal secretions for changes in amount and color  - [place] appropriate cooling/warming therapies per order  - Administer medications as ordered  - Instruct and encourage patient and family to use good hand hygiene technique  - Identify and instruct in appropriate isolation precautions for identified infection/condition  Outcome: Progressing  Goal:  Absence of fever/infection during neutropenic period  Description: INTERVENTIONS:  - Monitor WBC    Outcome: Progressing     Problem: SAFETY ADULT  Goal: Patient will remain free of falls  Description: INTERVENTIONS:  - Educate patient/family on patient safety including physical limitations  - Instruct patient to call for assistance with activity   - Consult OT/PT to assist with strengthening/mobility   - Keep Call bell within reach  - Keep bed low and locked with side rails adjusted as appropriate  - Keep care items and personal belongings within reach  - Initiate and maintain comfort rounds  - Make Fall Risk Sign visible to staff  - Offer Toileting every 2 Hours, in advance of need    - Apply yellow socks and bracelet for high fall risk patients  - Consider moving patient to room near nurses station  Outcome: Progressing  Goal: Maintain or return to baseline ADL function  Description: INTERVENTIONS:  -  Assess patient's ability to carry out ADLs; assess patient's baseline for ADL function and identify physical deficits which impact ability to perform ADLs (bathing, care of mouth/teeth, toileting, grooming, dressing, etc.)  - Assess/evaluate cause of self-care deficits   - Assess range of motion  - Assess patient's mobility; develop plan if impaired  - Assess patient's need for assistive devices and provide as appropriate  - Encourage maximum independence but intervene and supervise when necessary  - Involve family in performance of ADLs  - Assess for home care needs following discharge   - Consider OT consult to assist with ADL evaluation and planning for discharge  - Provide patient education as appropriate  Outcome: Progressing  Goal: Maintains/Returns to pre admission functional level  Description: INTERVENTIONS:  - Perform AM-PAC 6 Click Basic Mobility/ Daily Activity assessment daily.  - Set and communicate daily mobility goal to care team and patient/family/caregiver.   - Collaborate with rehabilitation  services on mobility goals if consulted  - Perform Range of Motion 3 times a day.  - Reposition patient every 2 hours.  - Dangle patient 3 times a day  - Stand patient 3 times a day  - Ambulate patient 3 times a day  - Out of bed to chair 3 times a day   - Out of bed for meals 3 times a day  - Out of bed for toileting  - Record patient progress and toleration of activity level   Outcome: Progressing     Problem: DISCHARGE PLANNING  Goal: Discharge to home or other facility with appropriate resources  Description: INTERVENTIONS:  - Identify barriers to discharge w/patient and caregiver  - Arrange for needed discharge resources and transportation as appropriate  - Identify discharge learning needs (meds, wound care, etc.)  - Arrange for interpretive services to assist at discharge as needed  - Refer to Case Management Department for coordinating discharge planning if the patient needs post-hospital services based on physician/advanced practitioner order or complex needs related to functional status, cognitive ability, or social support system  Outcome: Progressing     Problem: Knowledge Deficit  Goal: Patient/family/caregiver demonstrates understanding of disease process, treatment plan, medications, and discharge instructions  Description: Complete learning assessment and assess knowledge base.  Interventions:  - Provide teaching at level of understanding  - Provide teaching via preferred learning methods  Outcome: Progressing     Problem: Prexisting or High Potential for Compromised Skin Integrity  Goal: Skin integrity is maintained or improved  Description: INTERVENTIONS:  - Identify patients at risk for skin breakdown  - Assess and monitor skin integrity  - Assess and monitor nutrition and hydration status  - Monitor labs   - Assess for incontinence   - Turn and reposition patient  - Assist with mobility/ambulation  - Relieve pressure over bony prominences  - Avoid friction and shearing  - Provide appropriate  hygiene as needed including keeping skin clean and dry  - Evaluate need for skin moisturizer/barrier cream  - Collaborate with interdisciplinary team   - Patient/family teaching  - Consider wound care consult   Outcome: Progressing

## 2025-02-21 NOTE — PLAN OF CARE
Problem: Potential for Falls  Goal: Patient will remain free of falls  Description: INTERVENTIONS:  - Educate patient/family on patient safety including physical limitations  - Instruct patient to call for assistance with activity   - Consult OT/PT to assist with strengthening/mobility   - Keep Call bell within reach  - Keep bed low and locked with side rails adjusted as appropriate  - Keep care items and personal belongings within reach  - Initiate and maintain comfort rounds  - Make Fall Risk Sign visible to staff  - Apply yellow socks and bracelet for high fall risk patients  - Consider moving patient to room near nurses station  Outcome: Progressing     Problem: PAIN - ADULT  Goal: Verbalizes/displays adequate comfort level or baseline comfort level  Description: Interventions:  - Encourage patient to monitor pain and request assistance  - Assess pain using appropriate pain scale  - Administer analgesics based on type and severity of pain and evaluate response  - Implement non-pharmacological measures as appropriate and evaluate response  - Consider cultural and social influences on pain and pain management  - Notify physician/advanced practitioner if interventions unsuccessful or patient reports new pain  Outcome: Progressing     Problem: INFECTION - ADULT  Goal: Absence or prevention of progression during hospitalization  Description: INTERVENTIONS:  - Assess and monitor for signs and symptoms of infection  - Monitor lab/diagnostic results  - Monitor all insertion sites, i.e. indwelling lines, tubes, and drains  - Monitor endotracheal if appropriate and nasal secretions for changes in amount and color  - [place] appropriate cooling/warming therapies per order  - Administer medications as ordered  - Instruct and encourage patient and family to use good hand hygiene technique  - Identify and instruct in appropriate isolation precautions for identified infection/condition  Outcome: Progressing  Goal: Absence  of fever/infection during neutropenic period  Description: INTERVENTIONS:  - Monitor WBC    Outcome: Progressing     Problem: SAFETY ADULT  Goal: Patient will remain free of falls  Description: INTERVENTIONS:  - Educate patient/family on patient safety including physical limitations  - Instruct patient to call for assistance with activity   - Consult OT/PT to assist with strengthening/mobility   - Keep Call bell within reach  - Keep bed low and locked with side rails adjusted as appropriate  - Keep care items and personal belongings within reach  - Initiate and maintain comfort rounds  - Make Fall Risk Sign visible to staff  - Apply yellow socks and bracelet for high fall risk patients  - Consider moving patient to room near nurses station  Outcome: Progressing  Goal: Maintain or return to baseline ADL function  Description: INTERVENTIONS:  -  Assess patient's ability to carry out ADLs; assess patient's baseline for ADL function and identify physical deficits which impact ability to perform ADLs (bathing, care of mouth/teeth, toileting, grooming, dressing, etc.)  - Assess/evaluate cause of self-care deficits   - Assess range of motion  - Assess patient's mobility; develop plan if impaired  - Assess patient's need for assistive devices and provide as appropriate  - Encourage maximum independence but intervene and supervise when necessary  - Involve family in performance of ADLs  - Assess for home care needs following discharge   - Consider OT consult to assist with ADL evaluation and planning for discharge  - Provide patient education as appropriate  Outcome: Progressing  Goal: Maintains/Returns to pre admission functional level  Description: INTERVENTIONS:  - Perform AM-PAC 6 Click Basic Mobility/ Daily Activity assessment daily.  - Set and communicate daily mobility goal to care team and patient/family/caregiver.   - Collaborate with rehabilitation services on mobility goals if consulted  - Out of bed for  toileting  - Record patient progress and toleration of activity level   Outcome: Progressing     Problem: DISCHARGE PLANNING  Goal: Discharge to home or other facility with appropriate resources  Description: INTERVENTIONS:  - Identify barriers to discharge w/patient and caregiver  - Arrange for needed discharge resources and transportation as appropriate  - Identify discharge learning needs (meds, wound care, etc.)  - Arrange for interpretive services to assist at discharge as needed  - Refer to Case Management Department for coordinating discharge planning if the patient needs post-hospital services based on physician/advanced practitioner order or complex needs related to functional status, cognitive ability, or social support system  Outcome: Progressing     Problem: Knowledge Deficit  Goal: Patient/family/caregiver demonstrates understanding of disease process, treatment plan, medications, and discharge instructions  Description: Complete learning assessment and assess knowledge base.  Interventions:  - Provide teaching at level of understanding  - Provide teaching via preferred learning methods  Outcome: Progressing     Problem: Prexisting or High Potential for Compromised Skin Integrity  Goal: Skin integrity is maintained or improved  Description: INTERVENTIONS:  - Identify patients at risk for skin breakdown  - Assess and monitor skin integrity  - Assess and monitor nutrition and hydration status  - Monitor labs   - Assess for incontinence   - Turn and reposition patient  - Assist with mobility/ambulation  - Relieve pressure over bony prominences  - Avoid friction and shearing  - Provide appropriate hygiene as needed including keeping skin clean and dry  - Evaluate need for skin moisturizer/barrier cream  - Collaborate with interdisciplinary team   - Patient/family teaching  - Consider wound care consult   Outcome: Progressing

## 2025-02-21 NOTE — PROGRESS NOTES
Progress Note - Acute Pain   Name: Michael Wilkinson 55 y.o. male I MRN: [id number]  Unit/Bed#: [facility] 829-01 I Date of Admission: 2/16/2025   Date of Service: 2/21/2025 I Hospital Day: 5      Pt seen and examined today, denies n/v/pruritus/LE weakness.  IS max, room air, good cough.  CT removed today.  Discharge pending.  Epidural to be removed today.  Assessment & Plan  Fracture of multiple ribs of right side  Mechanical fall resulting in Rt. 5-10 rib fractures    Rib Fracture Evaluation:  Chest tube: NA  Respiratory Co-morbidities: General debility  SpO2:   SPO2 RA Rest      Flowsheet Row ED to Hosp-Admission (Current) from 2/16/2025 in [facility] 8   SpO2 95 %   SpO2 Activity At Rest   O2 Device None (Room air)   O2 Flow Rate --                                                                             Incentive Spirometer: Incentive Spirometry Achieved (mL): 2500 mL   Platelet Count:   Results from last 7 days   Lab Units 02/19/25  0603   PLATELETS Thousands/uL 189     Coags:   Results from last 7 days   Lab Units 02/17/25  0406   INR  1.02   PROTIME seconds 13.7     Home anticoagulants: None      Continue PCEA at 10/4/10/3, pain well controlled   - if CT is removed, can plan for d/c epidural 2/21, hold AM SQH   - Epidural removed 2/21 tip intact    Multimodal Analgesia:   - Po tylenol 975mg Q8H   - IV dilaudid 0.5mg Q3H prn for breakthrough pain   - oxycodone 5/10mg Q4H for mod/severe pain   - Lidocaine patches Q12HR   - Gabapentin 100mg TID for neuropathic pain   - PO robaxin 750mg Q6H for muscle spasms   - PRN narcan which taking opioids   - Please hold all AC except for sc heparin while epidural in place      Traumatic pneumothorax    Other specified hypothyroidism    Hypertension      APS will continue to follow. Please contact Acute Pain Service - via SecureChat from [phone] with additional questions or concerns. See [employer] or [employer] for additional contacts and after hours  information.     Subjective   Michael Wilkinson is a 55 y.o. male who presents with h/o HTN, HLD, hypothyroidism and alcohol abuse presented after mechanical fall resulting in multiple rt.sided rib fractures. APS team consulted per rib fracture protocol.     Pain History  Current pain location(s):  Pain Score: 6  Pain Location/Orientation: Location: Chest  Pain Scale: Pain Assessment Tool: 0-10  24 hour history: as above    Opioid requirement previous 24 hours: 0  Meds/Allergies   all current active meds have been reviewed  No Known Allergies  Objective :  Temp:  [98.2 °F (36.8 °C)-98.7 °F (37.1 °C)] 98.7 °F (37.1 °C)  HR:  [76-94] 85  BP: (119-134)/(75-86) 134/86  Resp:  [16-18] 18  SpO2:  [95 %-96 %] 96 %  O2 Device: None (Room air)    Physical Exam  Vitals and nursing note reviewed.   Constitutional:       General: He is not in acute distress.     Appearance: Normal appearance.   HENT:      Head: Normocephalic and atraumatic.      Mouth/Throat:      Mouth: Mucous membranes are moist.   Eyes:      Extraocular Movements: Extraocular movements intact.   Cardiovascular:      Rate and Rhythm: Normal rate.   Pulmonary:      Effort: Pulmonary effort is normal.      Breath sounds: No rhonchi.   Chest:      Chest wall: No tenderness.   Abdominal:      General: Abdomen is flat.   Musculoskeletal:         General: No swelling.   Skin:     General: Skin is warm and dry.   Neurological:      Mental Status: He is alert and oriented to person, place, and time.   Psychiatric:         Mood and Affect: Mood normal.         Behavior: Behavior normal.      Epidural: Site clean/dry/intact, no surrounding erythema/edema/induration, infusion functioning appropriately , stopped, removed tip intact      Lab Results: I have reviewed the following results:  Estimated Creatinine Clearance: 133.1 mL/min (by C-G formula based on SCr of 0.68 mg/dL).  Lab Results   Component Value Date    WBC 7.63 02/19/2025    HGB 13.5 02/19/2025    HCT 39.0  02/19/2025     02/19/2025         Component Value Date/Time    K 3.0 (L) 02/19/2025 0603     02/19/2025 0603    CO2 24 02/19/2025 0603    BUN 11 02/19/2025 0603    CREATININE 0.68 02/19/2025 0603         Component Value Date/Time    CALCIUM 8.5 02/19/2025 0603    ALKPHOS 54 02/16/2025 1043    AST 43 (H) 02/16/2025 1043    ALT 37 02/16/2025 1043    TP 7.0 02/16/2025 1043    ALB 4.0 02/16/2025 1043       Imaging Results Review: No pertinent imaging studies reviewed.  Other Study Results Review: No additional pertinent studies reviewed.     Administrative Statements   I have spent a total time of 20 minutes in caring for this patient on the day of the visit/encounter including Risks and benefits of tx options, Instructions for management, Patient and family education, Importance of tx compliance, Risk factor reductions, Impressions, Counseling / Coordination of care, Documenting in the medical record, Reviewing/placing orders in the medical record (including tests, medications, and/or procedures), Obtaining or reviewing history  , and Communicating with other healthcare professionals .

## 2025-02-21 NOTE — PROGRESS NOTES
Progress Note - Trauma   Name: Michael Wilkinson 55 y.o. male I MRN: [id number]  [facility] I Date of Admission: 2/16/2025   Date of Service: 2/21/2025 I Hospital Day: 5    Assessment & Plan  Fracture of multiple ribs of right side  - Multiple right-sided rib fractures (5-10), present on admission.  - Continue rib fracture protocol.  - Continue to encourage incentive spirometer use and adequate pulmonary hygiene.    - Appreciate APS evaluation and recommendations.  - Continue multimodal analgesic regimen.  - Supplemental oxygen via nasal cannula as needed to maintain saturations greater than or equal to 94%.  - PT and OT   - Outpatient follow-up in the trauma clinic for re-evaluation in approximately 2 weeks.  Traumatic pneumothorax  Present on 2/16 CT chest in setting of multiple rib fractures  Chest tube to water seal  2/20 CXR reviewed, small apical pneumothorax present  Plan for repeat CXR in am of 2/21 to reassess pneumothorax   Other specified hypothyroidism  Continue home levothyroxine   Hypertension  Continue home medications    Bowel Regimen: Senokot  VTE Prophylaxis:Heparin     Disposition: Inpatient management.  Dispo planning.  Please contact the SecureChat role for the Trauma service with any questions/concerns.    24 Hour Events : Vital signs stable on room air, 2200 cc of urine, stool 1X, chest tube 220 cc serosanguineous.  Subjective : Pain controlled, tolerating diet and passing bowel movements.  No nausea vomiting fever chills chest pain or shortness of breath.    Objective :  Temp:  [97.5 °F (36.4 °C)-98.7 °F (37.1 °C)] 98.7 °F (37.1 °C)  HR:  [76-94] 85  BP: (119-134)/(75-86) 134/86  Resp:  [15-18] 18  SpO2:  [94 %-96 %] 96 %  O2 Device: None (Room air)    I/O         02/19 0701 02/20 0700 02/20 0701 02/21 0700 02/21 0701 02/22 0700    P.O. 820 440     I.V. (mL/kg) 271.1 268.1 (3.4)     Total Intake(mL/kg) 1091.1 708.1 (8.9)     Urine (mL/kg/hr) 925 2200 (1.2)     Stool 0 0     Chest  Tube  220     Total Output 925 2420     Net +166.1 -1711.9            Unmeasured Urine Occurrence 1 x 1 x     Unmeasured Stool Occurrence 1 x 1 x           Lines/Drains/Airways       Active Status       Name Placement date Placement time Site Days    Epidural Catheter 02/17/25 02/17/25  1400  -- 3    Chest Tube Right 02/17/25  1910  --  3                  Physical Exam  Vitals and nursing note reviewed.   Constitutional:       General: He is not in acute distress.  HENT:      Head: Normocephalic and atraumatic.      Right Ear: External ear normal.      Left Ear: External ear normal.   Eyes:      Conjunctiva/sclera: Conjunctivae normal.   Cardiovascular:      Rate and Rhythm: Normal rate.   Pulmonary:      Effort: Pulmonary effort is normal. No respiratory distress.   Chest:      Comments: Right chest tube  Abdominal:      General: There is no distension.      Palpations: Abdomen is soft.      Tenderness: There is no abdominal tenderness. There is no guarding or rebound.   Neurological:      Mental Status: He is alert.        PIC Score  PIC Pain Score: 3 (2/21/2025  5:17 AM)  PIC Incentive Spirometry Score: 4 (2/21/2025 12:45 AM)  PIC Cough Description: 3 (2/21/2025 12:45 AM)  PIC Total Score: 10 (2/21/2025 12:45 AM)       If the Total PIC Score </=5, did you consult APS and evaluate patient for further intervention?: no      Pain:    Incentive Spirometry  Cough  3 = Controlled  4 = Above goal volume 3 = Strong  2 = Moderate  3 = Goal to alert volume 2 = Weak  1 = Severe  2 = Below alert volume 1 = Absent     1 = Unable to perform IS           Lab Results: I have reviewed the following results:  Recent Labs     02/19/25  0603   WBC 7.63   HGB 13.5   HCT 39.0      SODIUM 134*   K 3.0*      CO2 24   BUN 11   CREATININE 0.68   GLUC 102       Imaging Results Review: No pertinent imaging studies reviewed.  Other Study Results Review: No additional pertinent studies reviewed.

## 2025-02-22 ENCOUNTER — APPOINTMENT (INPATIENT)
Dept: RADIOLOGY | Facility: HOSPITAL | Age: 55
DRG: 183 | End: 2025-02-22
Payer: COMMERCIAL

## 2025-02-22 VITALS
HEART RATE: 95 BPM | TEMPERATURE: 99.1 F | RESPIRATION RATE: 17 BRPM | WEIGHT: 175 LBS | OXYGEN SATURATION: 96 % | BODY MASS INDEX: 23.7 KG/M2 | HEIGHT: 72 IN | DIASTOLIC BLOOD PRESSURE: 87 MMHG | SYSTOLIC BLOOD PRESSURE: 138 MMHG

## 2025-02-22 LAB — TTG IGA SER IA-ACNC: 0.6 U/ML (ref ?–10)

## 2025-02-22 PROCEDURE — 99238 HOSP IP/OBS DSCHRG MGMT 30/<: CPT | Performed by: NURSE PRACTITIONER

## 2025-02-22 PROCEDURE — 99232 SBSQ HOSP IP/OBS MODERATE 35: CPT | Performed by: ANESTHESIOLOGY

## 2025-02-22 PROCEDURE — 71046 X-RAY EXAM CHEST 2 VIEWS: CPT

## 2025-02-22 RX ORDER — ACETAMINOPHEN 325 MG/1
975 TABLET ORAL EVERY 8 HOURS SCHEDULED
Qty: 63 TABLET | Refills: 0 | Status: SHIPPED | OUTPATIENT
Start: 2025-02-22 | End: 2025-03-01

## 2025-02-22 RX ORDER — ACETAMINOPHEN 325 MG/1
975 TABLET ORAL EVERY 8 HOURS SCHEDULED
Qty: 63 TABLET | Refills: 0 | Status: SHIPPED | OUTPATIENT
Start: 2025-02-22 | End: 2025-02-22

## 2025-02-22 RX ORDER — LIDOCAINE 50 MG/G
2 PATCH TOPICAL DAILY
Qty: 28 PATCH | Refills: 0 | Status: SHIPPED | OUTPATIENT
Start: 2025-02-22 | End: 2025-02-22

## 2025-02-22 RX ORDER — AMOXICILLIN 250 MG
1 CAPSULE ORAL
Qty: 14 TABLET | Refills: 0 | Status: SHIPPED | OUTPATIENT
Start: 2025-02-22 | End: 2025-02-22

## 2025-02-22 RX ORDER — OXYCODONE HYDROCHLORIDE 5 MG/1
5 TABLET ORAL EVERY 4 HOURS PRN
Qty: 20 TABLET | Refills: 0 | Status: SHIPPED | OUTPATIENT
Start: 2025-02-22 | End: 2025-02-22

## 2025-02-22 RX ORDER — LIDOCAINE 50 MG/G
2 PATCH TOPICAL DAILY
Qty: 28 PATCH | Refills: 0 | Status: SHIPPED | OUTPATIENT
Start: 2025-02-22 | End: 2025-03-08

## 2025-02-22 RX ORDER — AMOXICILLIN 250 MG
1 CAPSULE ORAL
Qty: 14 TABLET | Refills: 0 | Status: SHIPPED | OUTPATIENT
Start: 2025-02-22 | End: 2025-03-08

## 2025-02-22 RX ORDER — GABAPENTIN 100 MG/1
100 CAPSULE ORAL 3 TIMES DAILY
Qty: 42 CAPSULE | Refills: 0 | Status: SHIPPED | OUTPATIENT
Start: 2025-02-22 | End: 2025-03-08

## 2025-02-22 RX ORDER — OXYCODONE HYDROCHLORIDE 5 MG/1
5 TABLET ORAL EVERY 4 HOURS PRN
Qty: 20 TABLET | Refills: 0 | Status: SHIPPED | OUTPATIENT
Start: 2025-02-22 | End: 2025-02-26 | Stop reason: SDUPTHER

## 2025-02-22 RX ORDER — GABAPENTIN 100 MG/1
100 CAPSULE ORAL 3 TIMES DAILY
Qty: 42 CAPSULE | Refills: 0 | Status: SHIPPED | OUTPATIENT
Start: 2025-02-22 | End: 2025-02-22

## 2025-02-22 RX ADMIN — LEVOTHYROXINE SODIUM 75 MCG: 0.07 TABLET ORAL at 09:10

## 2025-02-22 RX ADMIN — OXYCODONE HYDROCHLORIDE 5 MG: 5 TABLET ORAL at 05:12

## 2025-02-22 RX ADMIN — LOSARTAN POTASSIUM 100 MG: 50 TABLET, FILM COATED ORAL at 09:09

## 2025-02-22 RX ADMIN — METHOCARBAMOL 750 MG: 750 TABLET ORAL at 05:05

## 2025-02-22 RX ADMIN — ACETAMINOPHEN 975 MG: 325 TABLET, FILM COATED ORAL at 05:05

## 2025-02-22 RX ADMIN — GABAPENTIN 100 MG: 100 CAPSULE ORAL at 09:10

## 2025-02-22 RX ADMIN — METHOCARBAMOL 750 MG: 750 TABLET ORAL at 14:29

## 2025-02-22 RX ADMIN — OXYCODONE HYDROCHLORIDE 10 MG: 10 TABLET ORAL at 00:58

## 2025-02-22 RX ADMIN — OXYCODONE HYDROCHLORIDE 10 MG: 10 TABLET ORAL at 14:29

## 2025-02-22 RX ADMIN — METHOCARBAMOL 750 MG: 750 TABLET ORAL at 00:58

## 2025-02-22 RX ADMIN — HEPARIN SODIUM 5000 UNITS: 5000 INJECTION INTRAVENOUS; SUBCUTANEOUS at 05:05

## 2025-02-22 RX ADMIN — AMLODIPINE BESYLATE 10 MG: 10 TABLET ORAL at 09:10

## 2025-02-22 RX ADMIN — OXYCODONE HYDROCHLORIDE 10 MG: 10 TABLET ORAL at 09:10

## 2025-02-22 RX ADMIN — HYDROCHLOROTHIAZIDE 25 MG: 25 TABLET ORAL at 09:09

## 2025-02-22 RX ADMIN — ACETAMINOPHEN 975 MG: 325 TABLET, FILM COATED ORAL at 14:26

## 2025-02-22 NOTE — ASSESSMENT & PLAN NOTE
Mechanical fall resulting in Rt. 5-10 rib fractures    Rib Fracture Evaluation:  Chest tube: NA  Respiratory Co-morbidities: General debility  SpO2:   SPO2 RA Rest      Flowsheet Row ED to Hosp-Admission (Current) from 2/16/2025 in [facility] 8   SpO2 95 %   SpO2 Activity At Rest   O2 Device None (Room air)   O2 Flow Rate --                                                                             Incentive Spirometer: Incentive Spirometry Achieved (mL): 2500 mL   Platelet Count:   Results from last 7 days   Lab Units 02/19/25  0603   PLATELETS Thousands/uL 189     Coags:   Results from last 7 days   Lab Units 02/17/25  0406   INR  1.02   PROTIME seconds 13.7     Home anticoagulants: None      Continue PCEA at 10/4/10/3, pain well controlled   - if CT is removed, can plan for d/c epidural 2/21, hold AM SQH   - Epidural removed 2/21 tip intact    Multimodal Analgesia:   - Po tylenol 975mg Q8H   - IV dilaudid 0.5mg Q3H prn for breakthrough pain   - oxycodone 5/10mg Q4H for mod/severe pain   - Lidocaine patches Q12HR   - Gabapentin 100mg TID for neuropathic pain   - PO robaxin 750mg Q6H for muscle spasms   - PRN narcan which taking opioids

## 2025-02-22 NOTE — PROGRESS NOTES
Progress Note - Acute Pain   Name: Michael Wilkinson 55 y.o. male I MRN: [id number]  Unit/Bed#: [facility] 829-01 I Date of Admission: 2/16/2025   Date of Service: 2/22/2025 I Hospital Day: 6    Assessment & Plan  Fracture of multiple ribs of right side  Mechanical fall resulting in Rt. 5-10 rib fractures    Rib Fracture Evaluation:  Chest tube: NA  Respiratory Co-morbidities: General debility  SpO2:   SPO2 RA Rest      Flowsheet Row ED to Hosp-Admission (Current) from 2/16/2025 in [facility] 8   SpO2 95 %   SpO2 Activity At Rest   O2 Device None (Room air)   O2 Flow Rate --                                                                             Incentive Spirometer: Incentive Spirometry Achieved (mL): 2500 mL   Platelet Count:   Results from last 7 days   Lab Units 02/19/25  0603   PLATELETS Thousands/uL 189     Coags:   Results from last 7 days   Lab Units 02/17/25  0406   INR  1.02   PROTIME seconds 13.7     Home anticoagulants: None      Continue PCEA at 10/4/10/3, pain well controlled   - if CT is removed, can plan for d/c epidural 2/21, hold AM SQH   - Epidural removed 2/21 tip intact    Multimodal Analgesia:   - Po tylenol 975mg Q8H   - IV dilaudid 0.5mg Q3H prn for breakthrough pain   - oxycodone 5/10mg Q4H for mod/severe pain   - Lidocaine patches Q12HR   - Gabapentin 100mg TID for neuropathic pain   - PO robaxin 750mg Q6H for muscle spasms   - PRN narcan which taking opioids         Traumatic pneumothorax    Other specified hypothyroidism    Hypertension      APS will sign off at this time. Thank you for the consult. All opioids and other analgesics to be written at discretion of primary team. Please contact Acute Pain Service - via SecureChat from [phone] with additional questions or concerns. See [employer] or [employer] for additional contacts and after hours information.    Subjective   Michael Wilkinson is a 55 y.o. male seen resting in bed this morning with well controlled pain. Pain has  not changed significantly since removing his epidural yesterday. He has been taking his PRN pain medications consistently which control his pain exacerbations. He is not on supplemental oxygen and can hit >2500ml on incentive spirometry. He is adamant about being discharged today.    Pain History  Current pain location(s):  Pain Score: 8  Pain Location/Orientation: Orientation: Right, Location: Rib Cage  Pain Scale: Pain Assessment Tool: 0-10    Meds/Allergies   all current active meds have been reviewed  No Known Allergies  Objective :  Temp:  [97.5 °F (36.4 °C)-99.2 °F (37.3 °C)] 97.5 °F (36.4 °C)  HR:  [89-98] 92  BP: (115-149)/(75-90) 149/90  Resp:  [16-18] 18  SpO2:  [92 %-97 %] 97 %  O2 Device: None (Room air)    Physical Exam  Vitals and nursing note reviewed.   Constitutional:       Appearance: Normal appearance. He is normal weight.   HENT:      Head: Normocephalic and atraumatic.      Right Ear: External ear normal.      Left Ear: External ear normal.      Nose: Nose normal.      Mouth/Throat:      Mouth: Mucous membranes are moist.      Pharynx: Oropharynx is clear.   Eyes:      Conjunctiva/sclera: Conjunctivae normal.   Cardiovascular:      Rate and Rhythm: Normal rate and regular rhythm.      Pulses: Normal pulses.      Heart sounds: Normal heart sounds.   Pulmonary:      Effort: Pulmonary effort is normal.      Breath sounds: Normal breath sounds.   Chest:      Chest wall: Tenderness present.   Abdominal:      General: Abdomen is flat. Bowel sounds are normal.      Palpations: Abdomen is soft.   Musculoskeletal:         General: Normal range of motion.      Cervical back: Normal range of motion and neck supple.   Skin:     General: Skin is warm and dry.      Capillary Refill: Capillary refill takes less than 2 seconds.   Neurological:      General: No focal deficit present.      Mental Status: He is alert and oriented to person, place, and time. Mental status is at baseline.   Psychiatric:         Mood  and Affect: Mood normal.            Lab Results: I have reviewed the following results:  Estimated Creatinine Clearance: 133.1 mL/min (by C-G formula based on SCr of 0.68 mg/dL).  Lab Results   Component Value Date    WBC 7.63 02/19/2025    HGB 13.5 02/19/2025    HCT 39.0 02/19/2025     02/19/2025         Component Value Date/Time    K 3.0 (L) 02/19/2025 0603     02/19/2025 0603    CO2 24 02/19/2025 0603    BUN 11 02/19/2025 0603    CREATININE 0.68 02/19/2025 0603         Component Value Date/Time    CALCIUM 8.5 02/19/2025 0603    ALKPHOS 54 02/16/2025 1043    AST 43 (H) 02/16/2025 1043    ALT 37 02/16/2025 1043    TP 7.0 02/16/2025 1043    ALB 4.0 02/16/2025 1043

## 2025-02-22 NOTE — DISCHARGE INSTR - AVS FIRST PAGE
2/22 Chest xray shows a stable Pneumothorax. Continue no strenuous activity. Follow up CXR ordered prior to visit in trauma clinic please have it completed 1 day prior.    Traumatic Rib Fracture Discharge Instructions:    Your rib fractures will take time to heal. Rib fractures typically take at least 6-8 weeks to heal and may take longer.    Activity:  - Walking and normal light activities are encouraged. Normal daily activities including climbing steps are okay.  - Avoid lifting greater than 10 pounds, any strenuous activities and/or exercise, and contact sports until cleared by the trauma service.  - Continue using the incentive spirometer at least 10 times every hour while awake.    Return to work:    - You may return to work once you are cleared by the trauma service.    Medications:    - You should continue your current medication regimen after discharge unless otherwise instructed. Please refer to your discharge medication list for further details.  - Please take the pain medications as directed.  - You are encouraged to use non-narcotic pain medications first and whenever possible. Reserve the use of narcotic pain medication for moderate to severe pain not controlled by non-narcotic medications.  - No driving while taking narcotic pain medications.  - You may become constipated, especially if taking pain medications. You may take any over the counter stool softeners or laxatives as needed. Examples: Milk of Magnesia, Colace, Senna.    Additional Instructions:  - If you have any questions or concerns after discharge please call the office.  - Call office or return to ER if fever greater than 101, chills, worsening/uncontrollable pain, develop productive cough, increasing shortness of breath, and/or difficulty breathing.    Trauma Pneumothorax Discharge Instructions:    Your lungs will take time to heal. Do not do any strenuous activity or lift any thing more than 10 pounds until you are cleared to do so by  the Trauma clinic. Take your pain medication, if needed, as prescribed and make sure you continue to perform cough and deep breathing exercises and use your incentive spirometer every two hours while awake to maintain healthy lungs post injury. You should be seen in the Trauma Clinic 2-3 weeks after being discharged. Please call the Trauma Clinic sooner if you have any questions or concerns or if you experience increased work of breathing, shortness of breath, difficulty breathing, activity intolerance or chest pain.

## 2025-02-22 NOTE — DISCHARGE SUMMARY
Discharge Summary - Trauma   Name: Michael Wilkinson 55 y.o. male I MRN: [id number]  [facility] I Date of Admission: 2/16/2025   Date of Service: 2/22/2025 I Hospital Day: 6    Hypertension  Assessment & Plan  Continue home medications    Other specified hypothyroidism  Assessment & Plan  Continue home levothyroxine     Traumatic pneumothorax  Assessment & Plan  Present on 2/16 CT chest in setting of multiple rib fractures  Chest tube to water seal  2/20 CXR reviewed, small apical pneumothorax present  Plan for repeat CXR in am of 2/21 to reassess pneumothorax     * Fracture of multiple ribs of right side  Assessment & Plan  - Multiple right-sided rib fractures (5-10), present on admission.  - Continue rib fracture protocol.  - Continue to encourage incentive spirometer use and adequate pulmonary hygiene.    - Appreciate APS evaluation and recommendations.  - Continue multimodal analgesic regimen.  - Supplemental oxygen via nasal cannula as needed to maintain saturations greater than or equal to 94%.  - PT and OT   - Outpatient follow-up in the trauma clinic for re-evaluation in approximately 2 weeks.        Admission Date: 2/16/2025 1217  Discharge Date: 02/22/25  Admitting Diagnosis: Sinus tachycardia [R00.0]  Alcohol abuse [F10.10]  Traumatic pneumothorax, initial encounter [S27.0XXA]  Other specified hypothyroidism [E03.8]  Closed fracture of multiple ribs of right side, initial encounter [S22.41XA]  Hyperlipidemia, unspecified hyperlipidemia type [E78.5]  Hypertension, unspecified type [I10]  Discharge Diagnosis:   Medical Problems       Resolved Problems  Date Reviewed: 5/19/2021   None         HPI: Patient is a 55 year old male who sustained Right rib fractures 5-10 and a Small right Hemopneumothorax.    Procedures Performed:   Orders Placed This Encounter   Procedures    Chest Tube Insertion       Summary of Hospital Course:  Patient was admitted to the hospital. A CT was placed for his PTX. Also  received multimodal pain control. CT was evantually discontnued. The patient progressed well. PT/OT evaluated him and deemed him safe for discharge home. He will be discharged home with follow up in Trauma clinic. He was instructed not to fly on airplanes. He was instructed to keep CT dressing intact for 72 hours total.     Significant Findings, Care, Treatment and Services Provided: as above    Complications: none    Condition at Discharge: good       Discharge instructions/Information to patient and family:   See After Visit Summary (AVS) for information provided to patient and family.      Provisions for Follow-Up Care:  See after visit summary for information related to follow-up care and any pertinent home health orders.      PCP: Noelle Santana DO    Disposition: Home    Planned Readmission: No     Discharge Medications:  See after visit summary for reconciled discharge medications provided to patient and family.      Discharge Statement:  I have spent a total time of 30 minutes in caring for this patient on the day of the visit/encounter. .

## 2025-02-22 NOTE — PLAN OF CARE
Problem: PAIN - ADULT  Goal: Verbalizes/displays adequate comfort level or baseline comfort level  Description: Interventions:  - Encourage patient to monitor pain and request assistance  - Assess pain using appropriate pain scale  - Administer analgesics based on type and severity of pain and evaluate response  - Implement non-pharmacological measures as appropriate and evaluate response  - Consider cultural and social influences on pain and pain management  - Notify physician/advanced practitioner if interventions unsuccessful or patient reports new pain  Outcome: Progressing     Problem: INFECTION - ADULT  Goal: Absence or prevention of progression during hospitalization  Description: INTERVENTIONS:  - Assess and monitor for signs and symptoms of infection  - Monitor lab/diagnostic results  - Monitor all insertion sites, i.e. indwelling lines, tubes, and drains  - Monitor endotracheal if appropriate and nasal secretions for changes in amount and color  - [place] appropriate cooling/warming therapies per order  - Administer medications as ordered  - Instruct and encourage patient and family to use good hand hygiene technique  - Identify and instruct in appropriate isolation precautions for identified infection/condition  Outcome: Progressing

## 2025-02-24 NOTE — UTILIZATION REVIEW
NOTIFICATION OF ADMISSION DISCHARGE   This is a Notification of Discharge from [facility]. Please be advised that this patient has been discharge from our facility. Below you will find the admission and discharge date and time including the patient’s disposition.   UTILIZATION REVIEW CONTACT:  Zach Hutchinson  Utilization   Network Utilization Review Department  Phone: [phone] x carefully listen to the prompts. All voicemails are confidential.  Email: [email or web address]     ADMISSION INFORMATION  PRESENTATION DATE: 2/16/2025 12:17 PM  OBERVATION ADMISSION DATE: N/A  INPATIENT ADMISSION DATE: 2/16/25 12:42 PM   DISCHARGE DATE: 2/22/2025  5:40 PM   DISPOSITION:Home/Self Care    Network Utilization Review Department  ATTENTION: Please call with any questions or concerns to [phone] and carefully listen to the prompts so that you are directed to the right person. All voicemails are confidential.   For Discharge needs, contact Care Management DC Support Team at [phone] opt. 2  Send all requests for admission clinical reviews, approved or denied determinations and any other requests to dedicated fax number below belonging to the campus where the patient is receiving treatment. List of dedicated fax numbers for the Facilities:  FACILITY NAME UR FAX NUMBER   ADMISSION DENIALS (Administrative/Medical Necessity) [phone]   DISCHARGE SUPPORT TEAM ([facility]) [phone]   PARENT CHILD HEALTH (Maternity/NICU/Pediatrics) [phone]   [facility] [phone]   [facility] [phone]   [facility] [phone]   [facility] [phone]   [facility] [phone]   [facility] [phone]   [facility] [phone]   [facility] [phone]   [facility]  [facility] [phone]   [facility] [phone]   [facility] [phone]   [facility] [phone]

## 2025-02-27 ENCOUNTER — APPOINTMENT (OUTPATIENT)
Dept: RADIOLOGY | Facility: HOSPITAL | Age: 55
End: 2025-02-27
Payer: COMMERCIAL

## 2025-02-27 ENCOUNTER — APPOINTMENT (OUTPATIENT)
Dept: LAB | Facility: HOSPITAL | Age: 55
End: 2025-02-27
Payer: COMMERCIAL

## 2025-02-27 ENCOUNTER — HOSPITAL ENCOUNTER (OUTPATIENT)
Dept: RADIOLOGY | Facility: HOSPITAL | Age: 55
End: 2025-02-27
Payer: COMMERCIAL

## 2025-02-27 ENCOUNTER — RESULTS FOLLOW-UP (OUTPATIENT)
Dept: GASTROENTEROLOGY | Facility: CLINIC | Age: 55
End: 2025-02-27

## 2025-02-27 DIAGNOSIS — S22.41XA CLOSED FRACTURE OF MULTIPLE RIBS OF RIGHT SIDE, INITIAL ENCOUNTER: ICD-10-CM

## 2025-02-27 DIAGNOSIS — R19.7 DIARRHEA, UNSPECIFIED TYPE: ICD-10-CM

## 2025-02-27 PROCEDURE — 71046 X-RAY EXAM CHEST 2 VIEWS: CPT

## 2025-02-27 PROCEDURE — 89055 LEUKOCYTE ASSESSMENT FECAL: CPT

## 2025-02-27 PROCEDURE — 82653 EL-1 FECAL QUANTITATIVE: CPT

## 2025-02-28 ENCOUNTER — TELEPHONE (OUTPATIENT)
Dept: GASTROENTEROLOGY | Facility: CLINIC | Age: 55
End: 2025-02-28

## 2025-02-28 LAB
C COLI+JEJUNI TUF STL QL NAA+PROBE: NEGATIVE
C DIFF TOX GENS STL QL NAA+PROBE: NEGATIVE
CALPROTECTIN STL-MCNC: 253 ÂΜG/G
EC STX1+STX2 GENES STL QL NAA+PROBE: NEGATIVE
ELASTASE PANC STL-MCNT: 125 UG/G
SALMONELLA SP SPAO STL QL NAA+PROBE: NEGATIVE
SHIGELLA SP+EIEC IPAH STL QL NAA+PROBE: NEGATIVE
WBC SPEC QL GRAM STN: NORMAL

## 2025-02-28 NOTE — TELEPHONE ENCOUNTER
Left message at phone number available on patient's EMR.  Appears to be possibly place of business.  Message will be forwarded via [employer] to the patient regarding lab work and stool studies.  Message will be forwarded to the schedulers to have patient set up for colonoscopy now that results are back and patient does not have an infectious origin via his stool studies.

## 2025-02-28 NOTE — TELEPHONE ENCOUNTER
NIR Ziegler     Can we please get the patient scheduled for colonoscopy.  His stool studies were normal.  Placed order for MiraLAX prep as well.  Left message for patient on voicemail and also sent him a [employer] message making him aware.  Thank you.  Kassandra

## 2025-02-28 NOTE — TELEPHONE ENCOUNTER
Patients GI provider:  KELLY Nelson    Number to return call: [phone]    Reason for call: Pt returning Kelly Nelson's call. Patient stated he broke 5 ribs last weeks and will need to hold off on scheduling a colonoscopy. Patient is requesting a call back from KELLY Nelson, thank you.    Scheduled procedure/appointment date if applicable: Apt/procedure n/a

## 2025-03-01 LAB — G LAMBLIA AG STL QL IA: NEGATIVE

## 2025-03-03 NOTE — TELEPHONE ENCOUNTER
Lvm on cell for pt to call and schedule colonoscopy at [facility]-order is in  Pt needs miralax prep instructions sent

## 2025-03-04 ENCOUNTER — OFFICE VISIT (OUTPATIENT)
Dept: SURGERY | Facility: CLINIC | Age: 55
End: 2025-03-04
Payer: COMMERCIAL

## 2025-03-04 ENCOUNTER — APPOINTMENT (EMERGENCY)
Dept: RADIOLOGY | Facility: HOSPITAL | Age: 55
End: 2025-03-04
Payer: COMMERCIAL

## 2025-03-04 ENCOUNTER — HOSPITAL ENCOUNTER (EMERGENCY)
Facility: HOSPITAL | Age: 55
Discharge: HOME | End: 2025-03-04
Attending: EMERGENCY MEDICINE
Payer: COMMERCIAL

## 2025-03-04 VITALS
TEMPERATURE: 97.4 F | BODY MASS INDEX: 23.16 KG/M2 | SYSTOLIC BLOOD PRESSURE: 146 MMHG | OXYGEN SATURATION: 96 % | WEIGHT: 171 LBS | HEART RATE: 109 BPM | RESPIRATION RATE: 18 BRPM | HEIGHT: 72 IN | DIASTOLIC BLOOD PRESSURE: 79 MMHG

## 2025-03-04 VITALS
BODY MASS INDEX: 23.25 KG/M2 | OXYGEN SATURATION: 98 % | WEIGHT: 171.4 LBS | SYSTOLIC BLOOD PRESSURE: 138 MMHG | HEART RATE: 111 BPM | DIASTOLIC BLOOD PRESSURE: 86 MMHG | TEMPERATURE: 98.1 F

## 2025-03-04 DIAGNOSIS — S27.0XXD TRAUMATIC PNEUMOTHORAX, SUBSEQUENT ENCOUNTER: ICD-10-CM

## 2025-03-04 DIAGNOSIS — R00.0 TACHYCARDIA: Primary | ICD-10-CM

## 2025-03-04 DIAGNOSIS — I47.11 INAPPROPRIATE SINUS TACHYCARDIA (HCC): Primary | ICD-10-CM

## 2025-03-04 DIAGNOSIS — S22.41XD CLOSED FRACTURE OF MULTIPLE RIBS OF RIGHT SIDE WITH ROUTINE HEALING, SUBSEQUENT ENCOUNTER: ICD-10-CM

## 2025-03-04 LAB
ANION GAP SERPL CALCULATED.3IONS-SCNC: 9 MMOL/L (ref 4–13)
ATRIAL RATE: 127 BPM
BASOPHILS # BLD AUTO: 0.09 THOUSANDS/ÂΜL (ref 0–0.1)
BASOPHILS NFR BLD AUTO: 1 % (ref 0–1)
BUN SERPL-MCNC: 13 MG/DL (ref 5–25)
CALCIUM SERPL-MCNC: 9.5 MG/DL (ref 8.4–10.2)
CHLORIDE SERPL-SCNC: 100 MMOL/L (ref 96–108)
CO2 SERPL-SCNC: 26 MMOL/L (ref 21–32)
CREAT SERPL-MCNC: 0.69 MG/DL (ref 0.6–1.3)
D DIMER PPP FEU-MCNC: 1.86 UG/ML FEU
EOSINOPHIL # BLD AUTO: 0.33 THOUSAND/ÂΜL (ref 0–0.61)
EOSINOPHIL NFR BLD AUTO: 3 % (ref 0–6)
ERYTHROCYTE [DISTWIDTH] IN BLOOD BY AUTOMATED COUNT: 11.7 % (ref 11.6–15.1)
GFR SERPL CREATININE-BSD FRML MDRD: 106 ML/MIN/1.73SQ M
GLUCOSE SERPL-MCNC: 116 MG/DL (ref 65–140)
HCT VFR BLD AUTO: 42.2 % (ref 36.5–49.3)
HGB BLD-MCNC: 14.3 G/DL (ref 12–17)
IMM GRANULOCYTES # BLD AUTO: 0.07 THOUSAND/UL (ref 0–0.2)
IMM GRANULOCYTES NFR BLD AUTO: 1 % (ref 0–2)
LYMPHOCYTES # BLD AUTO: 1.87 THOUSANDS/ÂΜL (ref 0.6–4.47)
LYMPHOCYTES NFR BLD AUTO: 18 % (ref 14–44)
MCH RBC QN AUTO: 33.5 PG (ref 26.8–34.3)
MCHC RBC AUTO-ENTMCNC: 33.9 G/DL (ref 31.4–37.4)
MCV RBC AUTO: 99 FL (ref 82–98)
MONOCYTES # BLD AUTO: 0.98 THOUSAND/ÂΜL (ref 0.17–1.22)
MONOCYTES NFR BLD AUTO: 9 % (ref 4–12)
NEUTROPHILS # BLD AUTO: 7.19 THOUSANDS/ÂΜL (ref 1.85–7.62)
NEUTS SEG NFR BLD AUTO: 68 % (ref 43–75)
NRBC BLD AUTO-RTO: 0 /100 WBCS
P AXIS: 67 DEGREES
PLATELET # BLD AUTO: 619 THOUSANDS/UL (ref 149–390)
PMV BLD AUTO: 8.2 FL (ref 8.9–12.7)
POTASSIUM SERPL-SCNC: 3.7 MMOL/L (ref 3.5–5.3)
PR INTERVAL: 150 MS
QRS AXIS: -40 DEGREES
QRSD INTERVAL: 102 MS
QT INTERVAL: 308 MS
QTC INTERVAL: 448 MS
RBC # BLD AUTO: 4.27 MILLION/UL (ref 3.88–5.62)
SODIUM SERPL-SCNC: 135 MMOL/L (ref 135–147)
T WAVE AXIS: 9 DEGREES
VENTRICULAR RATE: 127 BPM
WBC # BLD AUTO: 10.53 THOUSAND/UL (ref 4.31–10.16)

## 2025-03-04 PROCEDURE — 71046 X-RAY EXAM CHEST 2 VIEWS: CPT

## 2025-03-04 PROCEDURE — 36415 COLL VENOUS BLD VENIPUNCTURE: CPT

## 2025-03-04 PROCEDURE — 71275 CT ANGIOGRAPHY CHEST: CPT

## 2025-03-04 PROCEDURE — 96375 TX/PRO/DX INJ NEW DRUG ADDON: CPT

## 2025-03-04 PROCEDURE — 93005 ELECTROCARDIOGRAM TRACING: CPT

## 2025-03-04 PROCEDURE — 99213 OFFICE O/P EST LOW 20 MIN: CPT | Performed by: PHYSICIAN ASSISTANT

## 2025-03-04 PROCEDURE — 93010 ELECTROCARDIOGRAM REPORT: CPT | Performed by: INTERNAL MEDICINE

## 2025-03-04 PROCEDURE — 99285 EMERGENCY DEPT VISIT HI MDM: CPT

## 2025-03-04 PROCEDURE — 99285 EMERGENCY DEPT VISIT HI MDM: CPT | Performed by: EMERGENCY MEDICINE

## 2025-03-04 PROCEDURE — 96365 THER/PROPH/DIAG IV INF INIT: CPT

## 2025-03-04 PROCEDURE — 85379 FIBRIN DEGRADATION QUANT: CPT

## 2025-03-04 PROCEDURE — 80048 BASIC METABOLIC PNL TOTAL CA: CPT

## 2025-03-04 PROCEDURE — 85025 COMPLETE CBC W/AUTO DIFF WBC: CPT

## 2025-03-04 RX ORDER — KETOROLAC TROMETHAMINE 30 MG/ML
15 INJECTION, SOLUTION INTRAMUSCULAR; INTRAVENOUS ONCE
Status: COMPLETED | OUTPATIENT
Start: 2025-03-04 | End: 2025-03-04

## 2025-03-04 RX ORDER — SODIUM CHLORIDE, SODIUM GLUCONATE, SODIUM ACETATE, POTASSIUM CHLORIDE, MAGNESIUM CHLORIDE, SODIUM PHOSPHATE, DIBASIC, AND POTASSIUM PHOSPHATE .53; .5; .37; .037; .03; .012; .00082 G/100ML; G/100ML; G/100ML; G/100ML; G/100ML; G/100ML; G/100ML
1000 INJECTION, SOLUTION INTRAVENOUS ONCE
Status: COMPLETED | OUTPATIENT
Start: 2025-03-04 | End: 2025-03-04

## 2025-03-04 RX ORDER — PHENOBARBITAL SODIUM 65 MG/ML
130 INJECTION, SOLUTION INTRAMUSCULAR; INTRAVENOUS ONCE
Status: COMPLETED | OUTPATIENT
Start: 2025-03-04 | End: 2025-03-04

## 2025-03-04 RX ORDER — DIAZEPAM 5 MG/1
5 TABLET ORAL ONCE
Status: COMPLETED | OUTPATIENT
Start: 2025-03-04 | End: 2025-03-04

## 2025-03-04 RX ADMIN — KETOROLAC TROMETHAMINE 15 MG: 30 INJECTION, SOLUTION INTRAMUSCULAR; INTRAVENOUS at 20:41

## 2025-03-04 RX ADMIN — DIAZEPAM 5 MG: 5 TABLET ORAL at 16:08

## 2025-03-04 RX ADMIN — IOHEXOL 85 ML: 350 INJECTION, SOLUTION INTRAVENOUS at 18:20

## 2025-03-04 RX ADMIN — SODIUM CHLORIDE, SODIUM GLUCONATE, SODIUM ACETATE, POTASSIUM CHLORIDE, MAGNESIUM CHLORIDE, SODIUM PHOSPHATE, DIBASIC, AND POTASSIUM PHOSPHATE 1000 ML: .53; .5; .37; .037; .03; .012; .00082 INJECTION, SOLUTION INTRAVENOUS at 16:26

## 2025-03-04 RX ADMIN — PHENOBARBITAL SODIUM 130 MG: 65 INJECTION INTRAMUSCULAR; INTRAVENOUS at 20:40

## 2025-03-04 NOTE — ASSESSMENT & PLAN NOTE
- Repeat chest x-ray on 2/27/2025 stable  -No further workup at this time  -Expected recovery is total of 6 weeks  -May follow-up as needed with the trauma team

## 2025-03-04 NOTE — ED ATTENDING ATTESTATION
"3/4/2025  I, Ryan Quintero DO, saw and evaluated the patient. I have discussed the patient with the resident/non-physician practitioner and agree with the resident's/non-physician practitioner's findings, Plan of Care, and MDM as documented in the resident's/non-physician practitioner's note, except where noted. All available labs and Radiology studies were reviewed.  I was present for key portions of any procedure(s) performed by the resident/non-physician practitioner and I was immediately available to provide assistance.       At this point I agree with the current assessment done in the Emergency Department.  I have conducted an independent evaluation of this patient a history and physical is as follows:    54 yo man referred from outpatient trauma clinic for tachycardia. Pt has no symptoms. He has been diagnosed with \"sinus tachycardia\" in the past. He has alcohol use disorder, formerly drinking 5-6 drinks/daily. Since he was admitted to trauma last week, he has drank 5-6 drinks/day only on the weekend.    Imp: sinus tachycardia. Ddx: inappropriate sinus tachycardia, EtOH withdrawal, less likely PE, etc. Plan: reviewed multiple years worth of ECGs dating back to 2020, every single one had ventricular rate >100, often >110 and some between 110-130. He has not had a single ECG with a rate <100.      ED Course         Critical Care Time  Procedures      "

## 2025-03-04 NOTE — PROGRESS NOTES
Name: Michael Wilkinson      : 1970      MRN: [id number]  Encounter Provider: Trauma Surgery provider  Encounter Date: 3/4/2025   Encounter department: [facility]  :  Assessment & Plan  Tachycardia  -Patient noted to be tachycardic to 130 in the room on evaluation  -Patient states that his heart rate normally runs high however not this time  -Upon recheck with 2 other devices and pulse check; patient remains what appears to be a heart rate of 125-130  -Unclear etiology of source of tachycardia; patient states that he is dehydrated  -Patient not significant amount of pain  -Saturation is appropriate with stable blood pressure.  -Recommending evaluation in the ER further workup  Orders:    Transfer to other facility    Closed fracture of multiple ribs of right side with routine healing, subsequent encounter  - Repeat chest x-ray on 2025 stable  -No further workup at this time  -Expected recovery is total of 6 weeks  -May follow-up as needed with the trauma team       Traumatic pneumothorax, subsequent encounter  -Resolved on most recent chest x-ray  - no further workup at this time  -Chest tube site is clean, dry, intact         Disposition: Recommending admission to the ER for further evaluation of significant tachycardia with heart rate in the 130s.  Blood pressure stable.  Ambulated patient to the ER waiting room and made ER team aware for triage.    Patient will not require any trauma further workup at this time.  His chest x-ray was stable.  Unless otherwise evaluated in the ER department with new findings; trauma team can sign off.    Does the patient have a positive PTSD Screen? No  Was a psychiatric referral provided? no    History of Present Illness   Michael Wilkinson is a 55 y.o. male who presents for evaluation of repeat chest x-ray with rib fractures and chest tube placement.  Patient clinically doing well at this time.  He has no chest pain or shortness of breath.  No nausea or  vomiting.  No new anxiety.  Ambulatory.  Tolerating p.o. intake.  Participating in home ADLs.  Review of Systems   All other systems reviewed and are negative.   as per HPI       Objective   /86 (BP Location: Left arm, Patient Position: Sitting, Cuff Size: Adult)   Pulse (!) 111   Temp 98.1 °F (36.7 °C) (Temporal)   Wt 77.7 kg (171 lb 6.4 oz)   SpO2 98%   BMI 23.25 kg/m²     Physical Exam  Constitutional:       General: He is not in acute distress.     Appearance: He is not ill-appearing.   HENT:      Head: Normocephalic.      Right Ear: External ear normal.      Left Ear: External ear normal.      Mouth/Throat:      Pharynx: Oropharynx is clear.   Cardiovascular:      Rate and Rhythm: Regular rhythm. Tachycardia present.      Pulses: Normal pulses.      Heart sounds: Normal heart sounds.   Pulmonary:      Effort: Pulmonary effort is normal.      Breath sounds: Normal breath sounds.      Comments: Right-sided chest tube site is clean, dry, intact  Chest:      Chest wall: No tenderness.   Abdominal:      General: There is no distension.      Palpations: Abdomen is soft.      Tenderness: There is no abdominal tenderness. There is no guarding.   Musculoskeletal:         General: No swelling or tenderness. Normal range of motion.   Skin:     General: Skin is warm and dry.   Neurological:      Mental Status: He is alert and oriented to person, place, and time. Mental status is at baseline.

## 2025-03-04 NOTE — ASSESSMENT & PLAN NOTE
-Resolved on most recent chest x-ray  - no further workup at this time  -Chest tube site is clean, dry, intact

## 2025-03-04 NOTE — ED PROVIDER NOTES
Time reflects when diagnosis was documented in both MDM as applicable and the Disposition within this note       Time User Action Codes Description Comment    3/4/2025  8:29 PM Henrry Omer Add [I47.11] Inappropriate sinus tachycardia (HCC)           ED Disposition       ED Disposition   Discharge    Condition   Stable    Date/Time   Tue Mar 4, 2025  8:28 PM    Comment   Michael Wilkinson discharge to home/self care.                   Assessment & Plan       Medical Decision Making  Patient is a 55-year-old male presenting for tachycardia.    Differential includes but not limited to pain, withdrawal, pneumothorax, PE, dehydration.  Cardiac workup ordered.  Patient had elevated D-dimer.  CT PE ordered.  No concern for central PE but study cannot rule out small subsegmental PEs.  Patient does not have shortness of breath and has history of tachycardia on ECGs for the past several years.  Unlikely to be PE causing tachycardia.  Patient given dose of phenobarbital prior to discharge for possible withdrawal symptoms.    Patient cleared for discharge with PCP and cardiology follow-up and return precautions.    Amount and/or Complexity of Data Reviewed  Labs: ordered.  Radiology: ordered.    Risk  Prescription drug management.             Medications   multi-electrolyte (ISOLYTE-S PH 7.4) bolus 1,000 mL (0 mL Intravenous Stopped 3/4/25 1726)   diazepam (VALIUM) tablet 5 mg (5 mg Oral Given 3/4/25 1608)   iohexol (OMNIPAQUE) 350 MG/ML injection (MULTI-DOSE) 85 mL (85 mL Intravenous Given 3/4/25 1820)   PHENobarbital injection 130 mg (130 mg Intravenous Given 3/4/25 2040)   ketorolac (TORADOL) injection 15 mg (15 mg Intravenous Given 3/4/25 2041)       ED Risk Strat Scores                        PERC Rule for PE      Flowsheet Row Most Recent Value   PERC Rule for PE    Age >=50 1 Filed at: 03/04/2025 1727   HR >=100 1 Filed at: 03/04/2025 1727   O2 Sat on room air < 95% 0 Filed at: 03/04/2025 1727   History of PE or DVT 0  Filed at: 03/04/2025 1727   Recent trauma or surgery 0 Filed at: 03/04/2025 1727   Hemoptysis 0 Filed at: 03/04/2025 1727   Exogenous estrogen 0 Filed at: 03/04/2025 1727   Unilateral leg swelling 0 Filed at: 03/04/2025 1727   PERC Rule for PE Results 2 Filed at: 03/04/2025 1727            SBIRT 20yo+      Flowsheet Row Most Recent Value   Initial Alcohol Screen: US AUDIT-C     1. How often do you have a drink containing alcohol? 4 Filed at: 03/04/2025 1519   2. How many drinks containing alcohol do you have on a typical day you are drinking?  4 Filed at: 03/04/2025 1519   3a. Male UNDER 65: How often do you have five or more drinks on one occasion? 4 Filed at: 03/04/2025 1519   Audit-C Score 12 Filed at: 03/04/2025 1519   JORDY: How many times in the past year have you...    Used an illegal drug or used a prescription medication for non-medical reasons? Never Filed at: 03/04/2025 1519            Wells' Criteria for PE      Flowsheet Row Most Recent Value   Wells' Criteria for PE    Clinical signs and symptoms of DVT 0 Filed at: 03/04/2025 1727   PE is primary diagnosis or equally likely 0 Filed at: 03/04/2025 1727   HR >100 1.5 Filed at: 03/04/2025 1727   Immobilization at least 3 days or Surgery in the previous 4 weeks --   Previous, objectively diagnosed PE or DVT 0 Filed at: 03/04/2025 1727   Hemoptysis 0 Filed at: 03/04/2025 1727   Malignancy with treatment within 6 months or palliative 0 Filed at: 03/04/2025 1727   Wells' Criteria Total 1.5 Filed at: 03/04/2025 1727                        History of Present Illness       Chief Complaint   Patient presents with    Rapid Heart Rate     Per pt was seen by trauma outpatient for his recent broken ribs, HR was fast and was told to come in here for evaluation.        Past Medical History:   Diagnosis Date    Disease of thyroid gland     Diverticulitis     Hypertension       Past Surgical History:   Procedure Laterality Date    ABDOMINAL ADHESION SURGERY N/A 10/12/2020     Procedure: LYSIS ADHESIONS extensive;  Surgeon: Silverio Barros MD;  Location: [facility];  Service: General    COLON SURGERY      COLONOSCOPY  02/22/2021    COLOSTOMY  11/02/2020    COLOSTOMY      COLOSTOMY TAKEDOWN/REVERSE MARQUEZ N/A 05/18/2021    Procedure: COLOSTOMY TAKEDOWN / REVERSE MARQUEZ;  Surgeon: Silverio Barros MD;  Location: [facility];  Service: General    HEMICOLOECTOMY W/ ANASTOMOSIS N/A 05/18/2021    Procedure: RESECTION COLON RIGHT OPEN, RIGHT TAP BLOCK;  Surgeon: Silverio Barros MD;  Location: [facility];  Service: General    IR DRAINAGE TUBE CHECK AND/OR REMOVAL  11/13/2020    IR DRAINAGE TUBE PLACEMENT  10/23/2020    IR DRAINAGE TUBE PLACEMENT  11/27/2020    IR PICC PLACEMENT DOUBLE LUMEN  10/16/2020    PA LAPS ABD PRTM&OMENTUM DX W/WO SPEC BR/WA SPX N/A 10/12/2020    Procedure: LAPAROSCOPY DIAGNOSTIC, drainage of pelvic abcess, placement drain;  Surgeon: Silverio Barros MD;  Location: [facility];  Service: General    PA LAPS ABD PRTM&OMENTUM DX W/WO SPEC BR/WA SPX N/A 11/02/2020    Procedure: LAPAROSCOPY DIAGNOSTIC, colostomy, repair of enterotomy;  Surgeon: Silverio Barros MD;  Location: [facility];  Service: General    PA LAPS ABD PRTM&OMENTUM DX W/WO SPEC BR/WA SPX N/A 03/17/2021    Procedure: LAPAROSCOPY DIAGNOSTIC;  Surgeon: Silverio Barros MD;  Location: [facility];  Service: General    PA LAPS CLSR NTRSTM LG/SM INT W/RESCJ & ANASTOMOSIS N/A 03/17/2021    Procedure: RESECTION COLON SIGMOID LAPAROSCOPIC, EXTENSIVE LYSIS OF ADHESIONS, TAKTDON OF SPLENIC FELXURE, LOW ANTERIOR RESECTION WITH COLORECTAL ANASTOMOSIS, INTEROPERATIVE COLONOSCOPY, OPEN REPAIR OF INCISIONAL HERNIA;  Surgeon: Silverio Barros MD;  Location: [facility];  Service: General      Family History   Problem Relation Age of Onset    Diabetes Mother     Colon cancer Father     Colon polyps Father     Heart disease Father     Diabetes Father     Diabetes Brother       Social History     Tobacco Use    Smoking  status: Former     Current packs/day: 0.00     Average packs/day: 0.5 packs/day for 15.0 years (7.5 ttl pk-yrs)     Types: Cigarettes     Start date:      Quit date: 2017     Years since quittin.1    Smokeless tobacco: Former     Types: Chew     Quit date:     Tobacco comments:     quit 6 years ago-previous hx of cigarette use- smoked for 15 years , averaged 1/2 pk/day. No current smoking or chew use at this time   Vaping Use    Vaping status: Never Used   Substance Use Topics    Alcohol use: Yes     Comment: patient reports 4 beers daily    Drug use: Not Currently     Frequency: 7.0 times per week     Types: Marijuana     Comment: patient reports he quit marijuana a month ago      E-Cigarette/Vaping    E-Cigarette Use Never User     Comments Denies       E-Cigarette/Vaping Substances      I have reviewed and agree with the history as documented.     Patient is a 55-year-old male with past medical history of alcoholism, hypertension, hypothyroidism presenting for tachycardia from trauma at outpatient visit.  Patient was admitted a few weeks ago for trauma where he fell on a brick wall, fracturing multiple right-sided ribs with subsequent pneumothorax.  Patient was discharged and has been feeling well since.  He stopped taking oxycodone yesterday for pain but feels that his pain is still well-controlled.  Patient was seen in the office today and found to be in sinus tachycardia and sent to the emergency department for further evaluation.  Patient does have a history of alcohol abuse and states that he does not drink daily anymore since his admission but still drinks 6 beers daily on the weekends.  Patient denies headache, headedness, dizziness, chest pain, shortness of breath, abdominal pain, nausea, vomiting, numbness, tingling, weakness.        Review of Systems        Objective       ED Triage Vitals   Temperature Pulse Blood Pressure Respirations SpO2 Patient Position - Orthostatic VS   25 4016  03/04/25 1514 03/04/25 1514 03/04/25 1514 03/04/25 1514 03/04/25 1745   (!) 97.4 °F (36.3 °C) (!) 121 155/85 18 98 % Lying      Temp Source Heart Rate Source BP Location FiO2 (%) Pain Score    03/04/25 1514 03/04/25 1745 03/04/25 1745 -- 03/04/25 1518    Temporal Monitor Left arm  No Pain      Vitals      Date and Time Temp Pulse SpO2 Resp BP Pain Score FACES Pain Rating User   03/04/25 2041 -- -- -- -- -- 6 -- JS   03/04/25 1745 -- 109 96 % 18 146/79 -- -- KH   03/04/25 1649 -- 109 98 % -- -- -- -- BM   03/04/25 1518 -- -- -- -- -- No Pain -- AM   03/04/25 1514 97.4 °F (36.3 °C) 121 98 % 18 155/85 -- -- LE            Physical Exam  Vitals and nursing note reviewed.   Constitutional:       General: He is not in acute distress.     Appearance: He is not ill-appearing, toxic-appearing or diaphoretic.   HENT:      Head: Normocephalic and atraumatic.      Mouth/Throat:      Mouth: Mucous membranes are dry.      Pharynx: Oropharynx is clear.   Eyes:      Extraocular Movements: Extraocular movements intact.      Pupils: Pupils are equal, round, and reactive to light.   Cardiovascular:      Rate and Rhythm: Regular rhythm. Tachycardia present.      Heart sounds: Murmur (Systolic at apex) heard.   Pulmonary:      Effort: Pulmonary effort is normal. No respiratory distress.      Breath sounds: Normal breath sounds.   Abdominal:      General: Abdomen is flat. There is no distension.      Palpations: Abdomen is soft.      Tenderness: There is no abdominal tenderness.   Musculoskeletal:         General: Normal range of motion.      Cervical back: Normal range of motion and neck supple.   Skin:     General: Skin is warm and dry.      Capillary Refill: Capillary refill takes 2 to 3 seconds.   Neurological:      General: No focal deficit present.      Mental Status: He is oriented to person, place, and time.      Comments: Mild tremor bilateral upper extremities   Psychiatric:         Mood and Affect: Mood is anxious.          Results Reviewed       Procedure Component Value Units Date/Time    Basic metabolic panel [[id number]] Collected: 03/04/25 1626    Lab Status: Final result Specimen: Blood from Arm, Left Updated: 03/04/25 1722     Sodium 135 mmol/L      Potassium 3.7 mmol/L      Chloride 100 mmol/L      CO2 26 mmol/L      ANION GAP 9 mmol/L      BUN 13 mg/dL      Creatinine 0.69 mg/dL      Glucose 116 mg/dL      Calcium 9.5 mg/dL      eGFR 106 ml/min/1.73sq m     Narrative:      National Kidney Disease Foundation guidelines for Chronic Kidney Disease (CKD):     Stage 1 with normal or high GFR (GFR > 90 mL/min/1.73 square meters)    Stage 2 Mild CKD (GFR = 60-89 mL/min/1.73 square meters)    Stage 3A Moderate CKD (GFR = 45-59 mL/min/1.73 square meters)    Stage 3B Moderate CKD (GFR = 30-44 mL/min/1.73 square meters)    Stage 4 Severe CKD (GFR = 15-29 mL/min/1.73 square meters)    Stage 5 End Stage CKD (GFR <15 mL/min/1.73 square meters)  Note: GFR calculation is accurate only with a steady state creatinine    D-dimer, quantitative [[id number]]  (Abnormal) Collected: 03/04/25 1626    Lab Status: Final result Specimen: Blood from Arm, Left Updated: 03/04/25 1711     D-Dimer, Quant 1.86 ug/ml FEU     Narrative:      In the evaluation for possible pulmonary embolism, in the appropriate (Well's Score of 4 or less) patient, the age adjusted d-dimer cutoff for this patient can be calculated as:    Age x 0.01 (in ug/mL) for Age-adjusted D-dimer exclusion threshold for a patient over 50 years.    CBC and differential [[id number]]  (Abnormal) Collected: 03/04/25 1626    Lab Status: Final result Specimen: Blood from Arm, Left Updated: 03/04/25 1704     WBC 10.53 Thousand/uL      RBC 4.27 Million/uL      Hemoglobin 14.3 g/dL      Hematocrit 42.2 %      MCV 99 fL      MCH 33.5 pg      MCHC 33.9 g/dL      RDW 11.7 %      MPV 8.2 fL      Platelets 619 Thousands/uL      nRBC 0 /100 WBCs      Segmented % 68 %      Immature Grans % 1 %       Lymphocytes % 18 %      Monocytes % 9 %      Eosinophils Relative 3 %      Basophils Relative 1 %      Absolute Neutrophils 7.19 Thousands/µL      Absolute Immature Grans 0.07 Thousand/uL      Absolute Lymphocytes 1.87 Thousands/µL      Absolute Monocytes 0.98 Thousand/µL      Eosinophils Absolute 0.33 Thousand/µL      Basophils Absolute 0.09 Thousands/µL             CTA chest pe study   Final Interpretation by Jadyn Dennison MD (03/04 1956)      Suboptimal opacification of the pulmonary arteries. No central or lobar emboli, but segmental emboli in the left lower lobe can not be excluded. No evidence of right heart strain or pulmonary infarct.      Small right pleural effusion and right lower lobe atelectasis.      Multiple mildly displaced right-sided rib fractures. Please see above comments. No pneumothorax.      The study was marked in EPIC for immediate notification.                  Workstation performed: [id number]         X-ray chest 2 views   Final Interpretation by Jeremías Monsalve MD (03/04 0713)      Small right pleural effusion is similar to prior study            Workstation performed: [id number]             ECG 12 Lead Documentation Only    Date/Time: 3/4/2025 3:47 PM    Performed by: Henrry Omer MD  Authorized by: Henrry Omer MD    Patient location:  ED  Previous ECG:     Previous ECG:  Compared to current  Interpretation:     Interpretation: abnormal    Rate:     ECG rate assessment: tachycardic    Rhythm:     Rhythm: sinus rhythm and sinus tachycardia    Ectopy:     Ectopy: none    QRS:     QRS axis:  Left    QRS intervals:  Normal  Conduction:     Conduction: abnormal      Abnormal conduction: incomplete RBBB    ST segments:     ST segments:  Normal  T waves:     T waves: non-specific        ED Medication and Procedure Management   Prior to Admission Medications   Prescriptions Last Dose Informant Patient Reported? Taking?   LEVOTHYROXINE SODIUM PO  Self Yes No   Sig: Take 75 mcg  by mouth daily Takes in the am   Olmesartan-amLODIPine-HCTZ (TRIBENZOR PO)  Self Yes No   Sig: Take by mouth every evening Pt reports dose is 40-10-25mg   SIMVASTATIN PO  Self Yes No   Sig: Take 20 mg by mouth every evening    colestipol (COLESTID) 1 g tablet   No No   Sig: Take 2 tablets (2 g total) by mouth 2 (two) times a day   gabapentin (NEURONTIN) 100 mg capsule   No No   Sig: Take 1 capsule (100 mg total) by mouth 3 (three) times a day for 14 days   lidocaine (LIDODERM) 5 %   No No   Sig: Apply 2 patches topically over 12 hours daily for 14 days Remove & Discard patch within 12 hours or as directed by MD   oxyCODONE (ROXICODONE) 5 immediate release tablet   No No   Sig: Take 1 tablet (5 mg total) by mouth see administration instructions You may take 2.5 mg (0.5 tab) for moderate pain or 5 mg (1 tab) for severe pain, every 4 hours, as needed.   polyethylene glycol (MiraLax) 17 GM/SCOOP powder   No No   Sig: Take 238 g by mouth once for 1 dose Take 238 g my mouth. Use as directed   potassium chloride (MICRO-K) 10 MEQ CR capsule  Self Yes No   Sig: Take by mouth 2 (two) times a day 5/11/21 Pt takes 99 mg twice a day.   senna-docusate sodium (SENOKOT S) 8.6-50 mg per tablet   No No   Sig: Take 1 tablet by mouth daily at bedtime for 14 days      Facility-Administered Medications: None     Discharge Medication List as of 3/4/2025  8:31 PM        CONTINUE these medications which have NOT CHANGED    Details   colestipol (COLESTID) 1 g tablet Take 2 tablets (2 g total) by mouth 2 (two) times a day, Starting Thu 2/6/2025, Normal      gabapentin (NEURONTIN) 100 mg capsule Take 1 capsule (100 mg total) by mouth 3 (three) times a day for 14 days, Starting Sat 2/22/2025, Until Sat 3/8/2025, Normal      LEVOTHYROXINE SODIUM PO Take 75 mcg by mouth daily Takes in the am, Historical Med      lidocaine (LIDODERM) 5 % Apply 2 patches topically over 12 hours daily for 14 days Remove & Discard patch within 12 hours or as directed  by MD, Starting Sat 2/22/2025, Until Sat 3/8/2025, Normal      Olmesartan-amLODIPine-HCTZ (TRIBENZOR PO) Take by mouth every evening Pt reports dose is 40-10-25mg, Historical Med      oxyCODONE (ROXICODONE) 5 immediate release tablet Take 1 tablet (5 mg total) by mouth see administration instructions You may take 2.5 mg (0.5 tab) for moderate pain or 5 mg (1 tab) for severe pain, every 4 hours, as needed., Starting Wed 2/26/2025, Normal      polyethylene glycol (MiraLax) 17 GM/SCOOP powder Take 238 g by mouth once for 1 dose Take 238 g my mouth. Use as directed, Starting Fri 2/28/2025, Normal      potassium chloride (MICRO-K) 10 MEQ CR capsule Take by mouth 2 (two) times a day 5/11/21 Pt takes 99 mg twice a day., Historical Med      senna-docusate sodium (SENOKOT S) 8.6-50 mg per tablet Take 1 tablet by mouth daily at bedtime for 14 days, Starting Sat 2/22/2025, Until Sat 3/8/2025, Normal      SIMVASTATIN PO Take 20 mg by mouth every evening , Historical Med             ED SEPSIS DOCUMENTATION   Time reflects when diagnosis was documented in both MDM as applicable and the Disposition within this note       Time User Action Codes Description Comment    3/4/2025  8:29 PM Henrry Omer Add [I47.11] Inappropriate sinus tachycardia (HCC)                  Henrry Omer MD  03/05/25 0148

## 2025-03-05 NOTE — DISCHARGE INSTRUCTIONS
Please follow-up with primary care provider.  Please return to the ED with new or worsening symptoms.  Please follow-up with cardiology.

## 2025-05-28 ENCOUNTER — CONSULT (OUTPATIENT)
Dept: CARDIOLOGY CLINIC | Facility: CLINIC | Age: 55
End: 2025-05-28
Payer: COMMERCIAL

## 2025-05-28 VITALS
BODY MASS INDEX: 24.16 KG/M2 | DIASTOLIC BLOOD PRESSURE: 80 MMHG | HEART RATE: 88 BPM | HEIGHT: 72 IN | WEIGHT: 178.4 LBS | SYSTOLIC BLOOD PRESSURE: 140 MMHG

## 2025-05-28 DIAGNOSIS — E78.2 MIXED HYPERLIPIDEMIA: ICD-10-CM

## 2025-05-28 DIAGNOSIS — R00.0 SINUS TACHYCARDIA: Primary | ICD-10-CM

## 2025-05-28 DIAGNOSIS — I10 PRIMARY HYPERTENSION: ICD-10-CM

## 2025-05-28 DIAGNOSIS — R00.2 PALPITATIONS: ICD-10-CM

## 2025-05-28 PROCEDURE — 99204 OFFICE O/P NEW MOD 45 MIN: CPT | Performed by: INTERNAL MEDICINE

## 2025-05-28 PROCEDURE — 93000 ELECTROCARDIOGRAM COMPLETE: CPT | Performed by: INTERNAL MEDICINE

## 2025-05-28 NOTE — PROGRESS NOTES
[facility]    Name:Michael Wilkinson   DOS: 5/28/2025     Chief Complaint:   Chief Complaint   Patient presents with    New Patient Visit     Consult inappropriate sinus tachycardia   Referred by [facility].    Today, pt reports no cardiac complaints.        HISTORY OF PRESENT ILLNESS:    HPI:  Michael Wilkinson is a 55 y.o. male. He  has a past medical history of Disease of thyroid gland, Diverticulitis, and Hypertension.    He presents to establish care with a cardiologist for evaluation of sinus tachycardia, hypertension, hyperlipidemia.  Was recently evaluated in the emergency department setting on 3/4/25.  He was found to be tachycardic during an outpatient surgery visit.  Workup in the ER was relatively unremarkable, with the exception of EKGs demonstrating sinus tachycardia.  He was noted to have an elevated D-dimer level of 1.86, and subsequently underwent a CT of the chest with contrast to exclude PE.  Although study quality was reportedly limited due to suboptimal opacification of the pulmonary arteries, there is no diagnosis of pulmonary embolism made based on those images.  The patient's heart rate has since improved.    Today, he reports that he has had palpitations for 30 years, off and on. Frequency is variable throughout the year. Other than palpitations, he has no cardiopulmonary complaints.  Specifically, he denies chest pain, shortness of breath, diaphoresis, dizziness, orthopnea, PND, edema, syncope.    Father had CABG x5 at the age of 55.      ROS    ROS: Pertinent positives and negatives as described in History of Present Illness. Remainder of a 14 point review of systems was negative.     Allergies[1]     Medications Ordered Prior to Encounter[2]    Past Medical History[3]    Past Surgical History[4]    Family History[5]    Social History     Socioeconomic History    Marital status: /Civil Union     Spouse name: Not on file    Number of children: Not on file    Years of  education: Not on file    Highest education level: Not on file   Occupational History    Not on file   Tobacco Use    Smoking status: Former     Current packs/day: 0.00     Average packs/day: 0.5 packs/day for 15.0 years (7.5 ttl pk-yrs)     Types: Cigarettes     Start date:      Quit date: 2017     Years since quittin.4    Smokeless tobacco: Former     Types: Chew     Quit date:     Tobacco comments:     quit 6 years ago-previous hx of cigarette use- smoked for 15 years , averaged 1/2 pk/day. No current smoking or chew use at this time   Vaping Use    Vaping status: Never Used   Substance and Sexual Activity    Alcohol use: Yes     Comment: patient reports 4 beers daily    Drug use: Not Currently     Frequency: 7.0 times per week     Types: Marijuana     Comment: patient reports he quit marijuana a month ago    Sexual activity: Not Currently   Other Topics Concern    Not on file   Social History Narrative    Not on file     Social Drivers of Health     Financial Resource Strain: Not on file   Food Insecurity: No Food Insecurity (2025)    Nursing - Inadequate Food Risk Classification     Worried About Running Out of Food in the Last Year: Not on file     Ran Out of Food in the Last Year: Not on file     Ran Out of Food in the Last Year: Never true   Transportation Needs: No Transportation Needs (2025)    Nursing - Transportation Risk Classification     Lack of Transportation: Not on file     Lack of Transportation: No   Physical Activity: Not on file   Stress: Not on file   Social Connections: Not on file   Intimate Partner Violence: Unknown (2025)    Nursing IPS     Feels Physically and Emotionally Safe: Not on file     Physically Hurt by Someone: Not on file     Humiliated or Emotionally Abused by Someone: Not on file     Physically Hurt by Someone: No     Hurt or Threatened by Someone: No   Housing Stability: Unknown (2025)    Nursing: Inadequate Housing Risk Classification     Has  Housing: Not on file     Worried About Losing Housing: Not on file     Unable to Get Utilities: Not on file     Unable to Pay for Housing in the Last Year: No     Has Housin       OBJECTIVE:    /80 (BP Location: Left arm, Patient Position: Sitting, Cuff Size: Standard)   Pulse 88   Ht 6' (1.829 m)   Wt 80.9 kg (178 lb 6.4 oz)   BMI 24.20 kg/m²      BP Readings from Last 3 Encounters:   25 140/80   25 146/79   25 138/86       Wt Readings from Last 3 Encounters:   25 80.9 kg (178 lb 6.4 oz)   25 77.6 kg (171 lb)   25 77.7 kg (171 lb 6.4 oz)         Physical Exam  Vitals reviewed.   Constitutional:       General: He is not in acute distress.     Appearance: Normal appearance. He is not diaphoretic.   HENT:      Head: Normocephalic and atraumatic.     Eyes:      Conjunctiva/sclera: Conjunctivae normal.     Neck:      Vascular: No JVD.     Cardiovascular:      Rate and Rhythm: Normal rate and regular rhythm.      Pulses: Normal pulses.      Heart sounds: Normal heart sounds. No murmur heard.     No friction rub. No gallop.   Pulmonary:      Effort: Pulmonary effort is normal.      Breath sounds: Normal breath sounds. No wheezing, rhonchi or rales.   Abdominal:      General: Abdomen is flat. Bowel sounds are normal. There is no distension.     Musculoskeletal:      Right lower leg: No edema.      Left lower leg: No edema.     Skin:     General: Skin is warm and dry.     Neurological:      General: No focal deficit present.      Mental Status: He is alert and oriented to person, place, and time.     Psychiatric:         Mood and Affect: Mood normal.         Behavior: Behavior normal.                                                       Cardiac testing:   EKG reviewed personally as performed on 3/4/25. My read: Sinus tachycardia    EKG is performed in office today: Sinus rhythm, leftward axis with possible left anterior fascicular block versus prior age-indeterminate  "inferior infarct.  Baseline artifact limits assessment.       LABS:  Lab Results   Component Value Date    BUN 13 03/04/2025    CREATININE 0.69 03/04/2025    CALCIUM 9.5 03/04/2025    K 3.7 03/04/2025    CO2 26 03/04/2025     03/04/2025    ALKPHOS 54 02/16/2025    AST 43 (H) 02/16/2025    ALT 37 02/16/2025        Lab Results   Component Value Date    WBC 10.53 (H) 03/04/2025    HGB 14.3 03/04/2025    HCT 42.2 03/04/2025    MCV 99 (H) 03/04/2025     (H) 03/04/2025       Lab Results   Component Value Date     02/14/2025    LDLCALC 73 02/14/2025    TRIG 80 02/14/2025       Lab Results   Component Value Date    HGBA1C 5.2 05/06/2021             ASSESSMENT/PLAN:  Diagnoses and all orders for this visit:    Sinus tachycardia  -     POCT ECG    Primary hypertension    Mixed hyperlipidemia    Other orders  -     Ambulatory Referral to Cardiology      55-year-old male presenting for cardiac evaluation of sinus tachycardia.  This was transient, perhaps in the setting of pain from recent rib fractures from February 16, 2025 that were incurred after a mechanical fall.  No reversible etiology was identified, and his rate and rhythm are normal in office today.  He has had no recurrent tachycardia that he is aware of, no cardiopulmonary complaints.    Plan:  Continue simvastatin for management hyperlipidemia, presently well-controlled as of his lipid panel 2/14/2025  Continue olmesartan-amlodipine-HCTZ for management of blood pressure.   48-hour Holter monitor to assess for the etiology of his palpitations.  I have ordered a transthoracic echo to establish a structural baseline given leftward axis, possible Q waves in the inferior leads although baseline artifact limits assessment.  Continue follow-up with PCP.            Alva Nesbitt MD [facility]      Portions of the record may have been created with voice recognition software. Occasional wrong word or \"sound alike\" substitutions may have occurred due to the " inherent limitations of voice recognition software. Please review the chart carefully and recognize, using context, where substitutions/typographical errors may have occurred.          [1] No Known Allergies  [2]   Current Outpatient Medications on File Prior to Visit   Medication Sig Dispense Refill    LEVOTHYROXINE SODIUM PO Take 75 mcg by mouth in the morning. Takes in the am.      Olmesartan-amLODIPine-HCTZ (TRIBENZOR PO) Take by mouth every evening Pt reports dose is 40-10-25mg      SIMVASTATIN PO Take 20 mg by mouth every evening      [DISCONTINUED] colestipol (COLESTID) 1 g tablet Take 2 tablets (2 g total) by mouth 2 (two) times a day 120 tablet 6    [DISCONTINUED] gabapentin (NEURONTIN) 100 mg capsule Take 1 capsule (100 mg total) by mouth 3 (three) times a day for 14 days 42 capsule 0    [DISCONTINUED] lidocaine (LIDODERM) 5 % Apply 2 patches topically over 12 hours daily for 14 days Remove & Discard patch within 12 hours or as directed by MD 28 patch 0    [DISCONTINUED] oxyCODONE (ROXICODONE) 5 immediate release tablet Take 1 tablet (5 mg total) by mouth see administration instructions You may take 2.5 mg (0.5 tab) for moderate pain or 5 mg (1 tab) for severe pain, every 4 hours, as needed. 20 tablet 0    [DISCONTINUED] polyethylene glycol (MiraLax) 17 GM/SCOOP powder Take 238 g by mouth once for 1 dose Take 238 g my mouth. Use as directed 238 g 0    [DISCONTINUED] potassium chloride (MICRO-K) 10 MEQ CR capsule Take by mouth 2 (two) times a day 5/11/21 Pt takes 99 mg twice a day.      [DISCONTINUED] senna-docusate sodium (SENOKOT S) 8.6-50 mg per tablet Take 1 tablet by mouth daily at bedtime for 14 days 14 tablet 0     No current facility-administered medications on file prior to visit.   [3]   Past Medical History:  Diagnosis Date    Disease of thyroid gland     Diverticulitis     Hypertension    [4]   Past Surgical History:  Procedure Laterality Date    ABDOMINAL ADHESION SURGERY N/A 10/12/2020     Procedure: LYSIS ADHESIONS extensive;  Surgeon: Silverio Barros MD;  Location: [facility];  Service: General    COLON SURGERY      COLONOSCOPY  02/22/2021    COLOSTOMY  11/02/2020    COLOSTOMY      COLOSTOMY TAKEDOWN/REVERSE MARQUEZ N/A 05/18/2021    Procedure: COLOSTOMY TAKEDOWN / REVERSE MARQUEZ;  Surgeon: Silverio Barros MD;  Location: [facility];  Service: General    HEMICOLOECTOMY W/ ANASTOMOSIS N/A 05/18/2021    Procedure: RESECTION COLON RIGHT OPEN, RIGHT TAP BLOCK;  Surgeon: Silverio Barros MD;  Location: [facility];  Service: General    IR DRAINAGE TUBE CHECK AND/OR REMOVAL  11/13/2020    IR DRAINAGE TUBE PLACEMENT  10/23/2020    IR DRAINAGE TUBE PLACEMENT  11/27/2020    IR PICC PLACEMENT DOUBLE LUMEN  10/16/2020    PA LAPS ABD PRTM&OMENTUM DX W/WO SPEC BR/WA SPX N/A 10/12/2020    Procedure: LAPAROSCOPY DIAGNOSTIC, drainage of pelvic abcess, placement drain;  Surgeon: Silverio Barros MD;  Location: [facility];  Service: General    PA LAPS ABD PRTM&OMENTUM DX W/WO SPEC BR/WA SPX N/A 11/02/2020    Procedure: LAPAROSCOPY DIAGNOSTIC, colostomy, repair of enterotomy;  Surgeon: Silverio Barros MD;  Location: [facility];  Service: General    PA LAPS ABD PRTM&OMENTUM DX W/WO SPEC BR/WA SPX N/A 03/17/2021    Procedure: LAPAROSCOPY DIAGNOSTIC;  Surgeon: Silverio Barros MD;  Location: [facility];  Service: General    PA LAPS CLSR NTRSTM LG/SM INT W/RESCJ & ANASTOMOSIS N/A 03/17/2021    Procedure: RESECTION COLON SIGMOID LAPAROSCOPIC, EXTENSIVE LYSIS OF ADHESIONS, TAKTDON OF SPLENIC FELXURE, LOW ANTERIOR RESECTION WITH COLORECTAL ANASTOMOSIS, INTEROPERATIVE COLONOSCOPY, OPEN REPAIR OF INCISIONAL HERNIA;  Surgeon: Silverio Barros MD;  Location: [facility];  Service: General   [5]   Family History  Problem Relation Name Age of Onset    Diabetes Mother      Colon cancer Father      Colon polyps Father      Heart disease Father      Diabetes Father      Diabetes Brother

## 2025-05-28 NOTE — PATIENT INSTRUCTIONS
You were seen today in the Cardiology office for evaluation a sinus tachycardia and palpitations.     Below is a summary of the recommendations based upon today's visit:    1. Dietary modifications - Follow a Mediterranean diet - one that is low in saturated fats (avoid red meat, dairy, cheeses), emphasize chicken, fish, turkey, tofu, vegetables, fruit (moderate quantities); also avoid simple carbs/starch/sugars, use whole wheat/grain/bran/oatmeal, snack on small quantities of nuts and fruits.     2. Exercise is very important. Ideally, AT LEAST four to five times weekly for 30 to 60 minutes per session - both cardiovascular and resistance work is OK. Listen to your body and rest when needed.    3. Continue all present medications.    4. Testing prior to your next visit includes: Holter monitor and ultrasound of the heart    5. Remember the ABCDEs to cardiovascular health for patients:  A: Awareness of cardiovascular symptoms  B: Blood pressure control  C: Cholesterol control  C: Cigarette avoidance  D: Diabetes control  D: Dietary choices  E: Exercise    6. Return in 3 months     Any testing ordered in office today will be available for patient review via [employer], and reviewed with me at the follow-up office visit as scheduled.    Thank you for choosing [facility].    Please call our office or use [employer] with any questions.

## 2025-08-05 ENCOUNTER — TELEPHONE (OUTPATIENT)
Age: 55
End: 2025-08-05

## (undated) DEVICE — DRAPE LAPAROTOMY W/POUCHES

## (undated) DEVICE — SUT ETHILON 3-0 PS-1 18 IN 1663H

## (undated) DEVICE — 3M™ TEGADERM™ TRANSPARENT FILM DRESSING FRAME STYLE, 1626W, 4 IN X 4-3/4 IN (10 CM X 12 CM), 50/CT 4CT/CASE: Brand: 3M™ TEGADERM™

## (undated) DEVICE — GLOVE SRG BIOGEL ECLIPSE 8

## (undated) DEVICE — ENDOPATH 5MM CURVED SCISSORS WITH MONOPOLAR CAUTERY: Brand: ENDOPATH

## (undated) DEVICE — OSTO KIT POST OP 1 PIECE 4 IN DRAINABLE

## (undated) DEVICE — TROCAR: Brand: KII® SLEEVE

## (undated) DEVICE — DRAIN SPONGES,6 PLY: Brand: EXCILON

## (undated) DEVICE — ECHELON FLEX POWERED PLUS ARTICULATING ENDOSCOPIC LINEAR CUTTER , 60MM: Brand: ECHELON FLEX

## (undated) DEVICE — PAD GROUNDING ADULT

## (undated) DEVICE — IRRIG ENDO FLO TUBING

## (undated) DEVICE — SURGICAL CLIPPER BLADE GENERAL USE

## (undated) DEVICE — HARMONIC ACE 5MM DIAMETER SHEARS 36CM SHAFT LENGTH + ADAPTIVE TISSUE TECHNOLOGY FOR USE WITH GENERATOR G11: Brand: HARMONIC ACE

## (undated) DEVICE — GLOVE SRG BIOGEL 6.5

## (undated) DEVICE — POOLE SUCTION HANDLE: Brand: CARDINAL HEALTH

## (undated) DEVICE — ELECTRODE BLADE MOD E-Z CLEAN 2.5IN 6.4CM -0012M

## (undated) DEVICE — SCD SEQUENTIAL COMPRESSION COMFORT SLEEVE MEDIUM KNEE LENGTH: Brand: KENDALL SCD

## (undated) DEVICE — SPONGE LAP 18 X 18 IN STRL RFD

## (undated) DEVICE — WOUND RETRACTOR AND PROTECTOR: Brand: ALEXIS O WOUND PROTECTOR-RETRACTOR

## (undated) DEVICE — TROCAR: Brand: KII FIOS FIRST ENTRY

## (undated) DEVICE — SUT VICRYL 0 UR-6 27 IN J603H

## (undated) DEVICE — TROCARS: Brand: KII® BALLOON BLUNT TIP SYSTEM

## (undated) DEVICE — PLUMEPEN PRO 10FT

## (undated) DEVICE — TRAY FOLEY 16FR URIMETER SURESTEP

## (undated) DEVICE — GAUZE SPONGES,16 PLY: Brand: CURITY

## (undated) DEVICE — SUT PDS II 1 CT-1 27 IN Z347H

## (undated) DEVICE — GLOVE INDICATOR PI UNDERGLOVE SZ 6.5 BLUE

## (undated) DEVICE — PACK PBDS LAP CHOLE RF

## (undated) DEVICE — SUT PDS II 1 CTX 36 IN Z371T

## (undated) DEVICE — TROCAR: Brand: KII SLEEVE

## (undated) DEVICE — ASTOUND STANDARD SURGICAL GOWN, XXL: Brand: CONVERTORS

## (undated) DEVICE — TUBING SUCTION 5MM X 12 FT

## (undated) DEVICE — TOWEL SET X-RAY

## (undated) DEVICE — DRAPE EQUIPMENT RF WAND

## (undated) DEVICE — MAYO STAND COVER: Brand: CONVERTORS

## (undated) DEVICE — INTENDED FOR TISSUE SEPARATION, AND OTHER PROCEDURES THAT REQUIRE A SHARP SURGICAL BLADE TO PUNCTURE OR CUT.: Brand: BARD-PARKER SAFETY BLADES SIZE 11, STERILE

## (undated) DEVICE — STANDARD SURGICAL GOWN, L: Brand: CONVERTORS

## (undated) DEVICE — JP CHAN DRN SIL HUBLESS 15FR W/TRO: Brand: CARDINAL HEALTH

## (undated) DEVICE — PENCIL ELECTROSURG E-Z CLEAN -0035H

## (undated) DEVICE — SUT MONOCRYL 4-0 PS-2 27 IN Y426H

## (undated) DEVICE — GLOVE INDICATOR PI UNDERGLOVE SZ 8 BLUE

## (undated) DEVICE — NEEDLE HYPO 22G X 1-1/2 IN

## (undated) DEVICE — OCCLUSIVE GAUZE STRIP,3% BISMUTH TRIBROMOPHENATE IN PETROLATUM BLEND: Brand: XEROFORM

## (undated) DEVICE — 1820 FOAM BLOCK NEEDLE COUNTER: Brand: DEVON

## (undated) DEVICE — OSTO ROD LOOP 90MM

## (undated) DEVICE — CURVED INTRALUMINAL STAPLER (ILS) 20 TITANIUM ADJUSTABLE HEIGHT STAPLES

## (undated) DEVICE — SUT SILK 2-0 SH 30 IN K833H

## (undated) DEVICE — SUT MONOCRYL 4-0 PS-2 18 IN Y496G

## (undated) DEVICE — DRAPE TOWEL: Brand: CONVERTORS

## (undated) DEVICE — SUT VICRYL 3-0 SH 27 IN J416H

## (undated) DEVICE — SYRINGE 10ML LL

## (undated) DEVICE — PROXIMATE SKIN STAPLERS (35 WIDE) CONTAINS 35 STAINLESS STEEL STAPLES (FIXED HEAD): Brand: PROXIMATE

## (undated) DEVICE — ACCESS PLATFORM FOR MINIMALLY INVASIVE SURGERY.: Brand: GELPORT® LAPAROSCOPIC  SYSTEM

## (undated) DEVICE — BETHLEHEM UNIVERSAL GYN LAP PK: Brand: CARDINAL HEALTH

## (undated) DEVICE — INTENDED FOR TISSUE SEPARATION, AND OTHER PROCEDURES THAT REQUIRE A SHARP SURGICAL BLADE TO PUNCTURE OR CUT.: Brand: BARD-PARKER SAFETY BLADES SIZE 15, STERILE

## (undated) DEVICE — JACKSON-PRATT 100CC BULB RESERVOIR: Brand: CARDINAL HEALTH

## (undated) DEVICE — CHLORAPREP HI-LITE 26ML ORANGE

## (undated) DEVICE — CHLORAPREP HI-LITE 10.5ML ORANGE

## (undated) DEVICE — DRAPE SURGIKIT SADDLE BAG LAP

## (undated) DEVICE — SUT SILK 3-0 SH CR/8 18 IN C013D

## (undated) DEVICE — STRAIGHT CATH RED RUBBER 12FR

## (undated) DEVICE — SUT VICRYL 0 CT-1 27 IN J260H

## (undated) DEVICE — PROXIMATE LINEAR CUTTER RELOAD, BLUE, 75MM: Brand: PROXIMATE

## (undated) DEVICE — SUT PDS II 3-0 SH 27 IN Z316H

## (undated) DEVICE — DRAPE FLUID WARMER (BIRD BATH)

## (undated) DEVICE — 3M™ TEGADERM™ TRANSPARENT FILM DRESSING FRAME STYLE, 1624W, 2-3/8 IN X 2-3/4 IN (6 CM X 7 CM), 100/CT 4CT/CASE: Brand: 3M™ TEGADERM™

## (undated) DEVICE — PROXIMATE RELOADABLE LINEAR CUTTER WITH SAFETY LOCK-OUT, 75MM: Brand: PROXIMATE

## (undated) DEVICE — ADHESIVE SKIN HIGH VISCOSITY EXOFIN 1ML

## (undated) DEVICE — BLUE HEAT SCOPE WARMER

## (undated) DEVICE — ENDOPATH ECHELON ENDOSCOPIC LINEAR CUTTER RELOADS, BLUE, 60MM: Brand: ECHELON ENDOPATH

## (undated) DEVICE — SUT PROLENE 2-0 SH 30 IN 8833H

## (undated) DEVICE — ALLENTOWN LAP CHOLE APP PACK: Brand: CARDINAL HEALTH

## (undated) DEVICE — 3M™ TEGADERM™ TRANSPARENT FILM DRESSING FRAME STYLE, 1627, 4 IN X 10 IN (10 CM X 25 CM), 20/CT 4CT/CASE: Brand: 3M™ TEGADERM™

## (undated) DEVICE — VIAL DECANTER

## (undated) DEVICE — TUBING SMOKE EVAC W/FILTRATION DEVICE PLUMEPORT ACTIV

## (undated) DEVICE — 2000CC GUARDIAN II: Brand: GUARDIAN

## (undated) DEVICE — TELFA NON-ADHERENT ABSORBENT DRESSING: Brand: TELFA

## (undated) DEVICE — HYDROPHILIC WOUND DRESSING WITH ZINC PLUS VITAMINS A AND B6.: Brand: DERMAGRAN®-B

## (undated) DEVICE — Device: Brand: OMNICLOSE TROCAR SITE CLOSURE DEVICE